# Patient Record
Sex: MALE | Race: WHITE | NOT HISPANIC OR LATINO | Employment: OTHER | URBAN - METROPOLITAN AREA
[De-identification: names, ages, dates, MRNs, and addresses within clinical notes are randomized per-mention and may not be internally consistent; named-entity substitution may affect disease eponyms.]

---

## 2018-04-11 ENCOUNTER — OFFICE VISIT (OUTPATIENT)
Dept: INTERNAL MEDICINE | Facility: CLINIC | Age: 78
End: 2018-04-11
Payer: MEDICARE

## 2018-04-11 VITALS
SYSTOLIC BLOOD PRESSURE: 138 MMHG | HEART RATE: 66 BPM | TEMPERATURE: 97 F | HEIGHT: 69 IN | WEIGHT: 153.88 LBS | DIASTOLIC BLOOD PRESSURE: 82 MMHG | BODY MASS INDEX: 22.79 KG/M2 | OXYGEN SATURATION: 99 %

## 2018-04-11 DIAGNOSIS — Z85.46 HISTORY OF PROSTATE CANCER: ICD-10-CM

## 2018-04-11 DIAGNOSIS — I10 ESSENTIAL HYPERTENSION: ICD-10-CM

## 2018-04-11 PROCEDURE — 99204 OFFICE O/P NEW MOD 45 MIN: CPT | Mod: S$GLB,,, | Performed by: FAMILY MEDICINE

## 2018-04-11 PROCEDURE — 3079F DIAST BP 80-89 MM HG: CPT | Mod: CPTII,S$GLB,, | Performed by: FAMILY MEDICINE

## 2018-04-11 PROCEDURE — 3075F SYST BP GE 130 - 139MM HG: CPT | Mod: CPTII,S$GLB,, | Performed by: FAMILY MEDICINE

## 2018-04-11 PROCEDURE — 99999 PR PBB SHADOW E&M-NEW PATIENT-LVL III: CPT | Mod: PBBFAC,,, | Performed by: FAMILY MEDICINE

## 2018-04-11 RX ORDER — LISINOPRIL 10 MG/1
10 TABLET ORAL DAILY
Qty: 90 TABLET | Refills: 3 | Status: SHIPPED | OUTPATIENT
Start: 2018-04-11 | End: 2018-10-16

## 2018-04-11 RX ORDER — MULTIVITAMIN
1 TABLET ORAL DAILY
COMMUNITY

## 2018-04-11 RX ORDER — LISINOPRIL 10 MG/1
10 TABLET ORAL DAILY
COMMUNITY
Start: 2018-01-25 | End: 2018-04-11 | Stop reason: SDUPTHER

## 2018-04-11 NOTE — PROGRESS NOTES
Subjective:   Patient ID:  Pravin Ferrari is a 77 y.o. male.    Chief Complaint:  Establish Care and Annual Exam    Past Medical History:   Diagnosis Date    Hypertension      Past Surgical History:   Procedure Laterality Date    EYE SURGERY      HERNIA REPAIR      PROSTATE SURGERY       Family History   Problem Relation Age of Onset    Aneurysm Father     Cerebral aneurysm Father     Prostate cancer Brother     Prostate cancer Son      Review of patient's allergies indicates:  No Known Allergies    Current Outpatient Prescriptions:     lisinopril 10 MG tablet, Take 1 tablet (10 mg total) by mouth once daily., Disp: 90 tablet, Rfl: 3    multivitamin (THERAGRAN) per tablet, Take 1 tablet by mouth once daily., Disp: , Rfl:        Establish care/follow-up hypertension/medication refill.  Most recent PCP Dr. Marshal Richards.  In November started on lisinopril 10 mg daily for elevated blood pressure.  No side effects.  Reports compliance.  Blood pressure readings from home show average less than 140/90 since starting medication.  History prostate cancer.  Followed by Dr. Seay.  Reports lab work done November 2017.  States Pneumonia, Tetanus, shingles, influenza vaccinations up-to-date.  States colonoscopy up-to-date.  No active problems or concerns today.      Hypertension   This is a chronic problem. The current episode started more than 1 month ago. The problem is controlled. Pertinent negatives include no anxiety, blurred vision, chest pain, headaches, malaise/fatigue, neck pain, orthopnea, palpitations, peripheral edema, PND, shortness of breath or sweats. There are no associated agents to hypertension. Risk factors for coronary artery disease include male gender and stress. Past treatments include ACE inhibitors. The current treatment provides significant improvement. There are no compliance problems.  There is no history of angina, kidney disease, CAD/MI, CVA, heart failure, left ventricular  "hypertrophy, PVD or retinopathy.       Review of Systems   Constitutional: Negative for fatigue and malaise/fatigue.   Eyes: Negative for blurred vision and visual disturbance.   Respiratory: Negative for cough, chest tightness and shortness of breath.    Cardiovascular: Negative for chest pain, palpitations, orthopnea, leg swelling and PND.   Gastrointestinal: Negative for abdominal pain, diarrhea, nausea and vomiting.   Genitourinary: Positive for frequency and urgency. Negative for decreased urine volume, difficulty urinating, dysuria, flank pain and hematuria.   Musculoskeletal: Negative for myalgias and neck pain.   Skin: Negative for rash.   Neurological: Negative for dizziness, syncope, weakness, light-headedness and headaches.   Psychiatric/Behavioral: Negative for sleep disturbance.       Objective:   /82 (BP Location: Right arm, Patient Position: Sitting, BP Method: Small (Manual))   Pulse 66   Temp 96.5 °F (35.8 °C) (Tympanic)   Ht 5' 9" (1.753 m)   Wt 69.8 kg (153 lb 14.1 oz)   SpO2 99%   BMI 22.72 kg/m²     Physical Exam   Constitutional: Vital signs are normal. He appears well-developed and well-nourished.   Neck: No JVD present.   Cardiovascular: Normal rate, regular rhythm and normal heart sounds.  Exam reveals no gallop and no friction rub.    No murmur heard.  Pulmonary/Chest: Effort normal and breath sounds normal. He has no wheezes. He has no rhonchi. He has no rales.   Abdominal: Soft. He exhibits no distension. There is no tenderness.   Musculoskeletal: He exhibits no edema.   Skin: Skin is warm, dry and intact. No rash noted.   Psychiatric: His mood appears anxious.   Nursing note and vitals reviewed.    Assessment:     1. Essential hypertension    2. History of prostate cancer      Plan:   Essential hypertension  -     lisinopril 10 MG tablet; Take 1 tablet (10 mg total) by mouth once daily.  Dispense: 90 tablet; Refill: 3  Controlled.  BP at goal.  Continue lisinopril 10 mg " daily.  Request old records from  and [facility].  After review of records update immunizations as needed.    History of prostate cancer  Stable.  No recurrence.  Incontinence and some  symptoms since surgery.  Follow-up Dr. Seay as scheduled.    Return to clinic 6 months or sooner as needed.

## 2018-10-16 ENCOUNTER — PATIENT MESSAGE (OUTPATIENT)
Dept: ADMINISTRATIVE | Facility: OTHER | Age: 78
End: 2018-10-16

## 2018-10-16 ENCOUNTER — LAB VISIT (OUTPATIENT)
Dept: LAB | Facility: HOSPITAL | Age: 78
End: 2018-10-16
Attending: FAMILY MEDICINE
Payer: MEDICARE

## 2018-10-16 ENCOUNTER — OFFICE VISIT (OUTPATIENT)
Dept: INTERNAL MEDICINE | Facility: CLINIC | Age: 78
End: 2018-10-16
Payer: MEDICARE

## 2018-10-16 VITALS
TEMPERATURE: 98 F | HEART RATE: 73 BPM | OXYGEN SATURATION: 98 % | WEIGHT: 160.25 LBS | HEIGHT: 69 IN | BODY MASS INDEX: 23.74 KG/M2 | DIASTOLIC BLOOD PRESSURE: 86 MMHG | SYSTOLIC BLOOD PRESSURE: 130 MMHG

## 2018-10-16 DIAGNOSIS — Z12.11 SCREENING FOR COLON CANCER: ICD-10-CM

## 2018-10-16 DIAGNOSIS — I10 ESSENTIAL HYPERTENSION: Chronic | ICD-10-CM

## 2018-10-16 DIAGNOSIS — Z23 NEED FOR PNEUMOCOCCAL VACCINATION: ICD-10-CM

## 2018-10-16 DIAGNOSIS — I10 ESSENTIAL HYPERTENSION: Primary | Chronic | ICD-10-CM

## 2018-10-16 DIAGNOSIS — Z85.46 HISTORY OF PROSTATE CANCER: Chronic | ICD-10-CM

## 2018-10-16 DIAGNOSIS — Z23 NEED FOR INFLUENZA VACCINATION: ICD-10-CM

## 2018-10-16 LAB
ALBUMIN SERPL BCP-MCNC: 3.6 G/DL
ALP SERPL-CCNC: 62 U/L
ALT SERPL W/O P-5'-P-CCNC: 26 U/L
ANION GAP SERPL CALC-SCNC: 5 MMOL/L
AST SERPL-CCNC: 27 U/L
BILIRUB SERPL-MCNC: 0.5 MG/DL
BUN SERPL-MCNC: 20 MG/DL
CALCIUM SERPL-MCNC: 9.3 MG/DL
CHLORIDE SERPL-SCNC: 106 MMOL/L
CHOLEST SERPL-MCNC: 171 MG/DL
CHOLEST/HDLC SERPL: 2.9 {RATIO}
CO2 SERPL-SCNC: 30 MMOL/L
CREAT SERPL-MCNC: 1.1 MG/DL
ERYTHROCYTE [DISTWIDTH] IN BLOOD BY AUTOMATED COUNT: 12.9 %
EST. GFR  (AFRICAN AMERICAN): >60 ML/MIN/1.73 M^2
EST. GFR  (NON AFRICAN AMERICAN): >60 ML/MIN/1.73 M^2
GLUCOSE SERPL-MCNC: 85 MG/DL
HCT VFR BLD AUTO: 45.1 %
HDLC SERPL-MCNC: 58 MG/DL
HDLC SERPL: 33.9 %
HGB BLD-MCNC: 14.3 G/DL
LDLC SERPL CALC-MCNC: 100 MG/DL
MCH RBC QN AUTO: 29.6 PG
MCHC RBC AUTO-ENTMCNC: 31.7 G/DL
MCV RBC AUTO: 93 FL
NONHDLC SERPL-MCNC: 113 MG/DL
PLATELET # BLD AUTO: 247 K/UL
PMV BLD AUTO: 11.1 FL
POTASSIUM SERPL-SCNC: 4.3 MMOL/L
PROT SERPL-MCNC: 6.9 G/DL
RBC # BLD AUTO: 4.83 M/UL
SODIUM SERPL-SCNC: 141 MMOL/L
TRIGL SERPL-MCNC: 65 MG/DL
WBC # BLD AUTO: 6.69 K/UL

## 2018-10-16 PROCEDURE — 3079F DIAST BP 80-89 MM HG: CPT | Mod: CPTII,,, | Performed by: FAMILY MEDICINE

## 2018-10-16 PROCEDURE — 99213 OFFICE O/P EST LOW 20 MIN: CPT | Mod: PBBFAC,PO | Performed by: FAMILY MEDICINE

## 2018-10-16 PROCEDURE — 3075F SYST BP GE 130 - 139MM HG: CPT | Mod: CPTII,,, | Performed by: FAMILY MEDICINE

## 2018-10-16 PROCEDURE — 80061 LIPID PANEL: CPT

## 2018-10-16 PROCEDURE — 90670 PCV13 VACCINE IM: CPT | Mod: PBBFAC,PO

## 2018-10-16 PROCEDURE — 90662 IIV NO PRSV INCREASED AG IM: CPT | Mod: PBBFAC,PO

## 2018-10-16 PROCEDURE — 85027 COMPLETE CBC AUTOMATED: CPT

## 2018-10-16 PROCEDURE — 1101F PT FALLS ASSESS-DOCD LE1/YR: CPT | Mod: CPTII,,, | Performed by: FAMILY MEDICINE

## 2018-10-16 PROCEDURE — 80053 COMPREHEN METABOLIC PANEL: CPT

## 2018-10-16 PROCEDURE — 99214 OFFICE O/P EST MOD 30 MIN: CPT | Mod: S$PBB,,, | Performed by: FAMILY MEDICINE

## 2018-10-16 PROCEDURE — 99999 PR PBB SHADOW E&M-EST. PATIENT-LVL III: CPT | Mod: PBBFAC,,, | Performed by: FAMILY MEDICINE

## 2018-10-16 PROCEDURE — 36415 COLL VENOUS BLD VENIPUNCTURE: CPT | Mod: PO

## 2018-10-16 NOTE — PROGRESS NOTES
"Subjective:   Patient ID: Pravin Ferrari is a 77 y.o. male.  Chief Complaint:  Follow-up      Patient presents follow-up on hypertension.  Previously on lisinopril 10 mg daily.  Out of medication for 3 months.  Blood pressure normal today.  Also reports blood pressure normal at home off medication.    No active cardiac symptoms or complaints.    History prostate cancer.  Stable.  Sees Dr. Seay for follow-up.  No new complaints or concerns today.      Routine health maintenance shows need for:  Lipid panel  Tetanus, shingles, pneumonia, and flu vaccine.    Colonoscopy.        Current Outpatient Medications:     multivitamin (THERAGRAN) per tablet, Take 1 tablet by mouth once daily., Disp: , Rfl:      Review of Systems   Constitutional: Negative for fatigue.   Eyes: Negative for visual disturbance.   Respiratory: Negative for cough, chest tightness and shortness of breath.    Cardiovascular: Negative for chest pain, palpitations and leg swelling.   Gastrointestinal: Negative for abdominal pain, diarrhea, nausea and vomiting.   Genitourinary: Negative for difficulty urinating.   Musculoskeletal: Negative for myalgias and neck pain.   Skin: Negative for rash.   Neurological: Negative for dizziness, syncope, weakness, light-headedness and headaches.   Psychiatric/Behavioral: Negative for sleep disturbance.     Objective:   /86 (BP Location: Right arm, Patient Position: Sitting, BP Method: Medium (Manual))   Pulse 73   Temp 97.6 °F (36.4 °C) (Tympanic)   Ht 5' 9" (1.753 m)   Wt 72.7 kg (160 lb 4.4 oz)   SpO2 98%   BMI 23.67 kg/m²     Physical Exam   Constitutional: Vital signs are normal. He appears well-developed and well-nourished.   Neck: No JVD present.   Cardiovascular: Normal rate, regular rhythm and normal heart sounds. Exam reveals no gallop and no friction rub.   No murmur heard.  Pulmonary/Chest: Effort normal and breath sounds normal. He has no wheezes. He has no rhonchi. He has no rales. "   Abdominal: Soft. He exhibits no distension. There is no tenderness.   Musculoskeletal: He exhibits no edema.   Skin: Skin is warm, dry and intact. No rash noted.   Psychiatric: He has a normal mood and affect.   Nursing note and vitals reviewed.    Assessment:     1. Essential hypertension    2. History of prostate cancer    3. Screening for colon cancer    4. Need for pneumococcal vaccination    5. Need for influenza vaccination      Plan:   Essential hypertension  -     CBC Without Differential; Future; Expected date: 10/16/2018  -     Comprehensive metabolic panel; Future; Expected date: 10/16/2018  -     Lipid panel; Future; Expected date: 10/16/2018  -     NURSING COMMUNICATION: Create [facility] Account  -     Hypertension Digital Medicine (HDMP) Enrollment Order  -     Hypertension Digital Medicine ([facility]): Assign Onboarding Questionnaires  Controlled.  BP at goal.  Off medication.    Okay to not restart lisinopril  In role in digital hypertension program.  Restart medication if average blood pressure greater than 140/90.      History of prostate cancer  Stable.    Follow-up Urology as scheduled.      RHM  -     Case request GI: COLONOSCOPY  -     (In Office Administered) Pneumococcal Conjugate Vaccine (13 Valent) (IM)  -     Influenza - High Dose (65+) (PF) (IM)   Defers tetanus and shingles vaccine until next visit.    Return to clinic 6 months or sooner as needed.

## 2018-10-17 ENCOUNTER — PATIENT MESSAGE (OUTPATIENT)
Dept: ADMINISTRATIVE | Facility: OTHER | Age: 78
End: 2018-10-17

## 2018-10-18 ENCOUNTER — DOCUMENTATION ONLY (OUTPATIENT)
Dept: ENDOSCOPY | Facility: HOSPITAL | Age: 78
End: 2018-10-18

## 2018-10-18 ENCOUNTER — TELEPHONE (OUTPATIENT)
Dept: INTERNAL MEDICINE | Facility: CLINIC | Age: 78
End: 2018-10-18

## 2018-10-18 RX ORDER — SODIUM, POTASSIUM,MAG SULFATES 17.5-3.13G
SOLUTION, RECONSTITUTED, ORAL ORAL
Qty: 354 ML | Refills: 0 | Status: SHIPPED | OUTPATIENT
Start: 2018-10-18 | End: 2019-03-11

## 2018-10-18 NOTE — TELEPHONE ENCOUNTER
Patient called in regards to digital monitor not compatible with the lap top.  Patient advised to call pharmacy for clarification.  Patient voiced understanding.

## 2018-10-18 NOTE — PROGRESS NOTES
Endoscopy Scheduling Questionnaire:    Call Type: Incoming call via [facility]    1. Have you been admitted overnight to the hospital in the past 3 months? no  2. Do you get CP and SOB while walking up a flight of stairs? no  3. Have you had a stent placed in the past 12 months? no  4. Have you had a stroke or heart attack in the past 6 months? no  5. Have you had any chest pain in the past 3 months? no      If so, have you been evaluated by your PCP or Cardiologist? no  6. Do you take weight loss medications? no  7. Have you been diagnosed with Diverticulitis within the past 3 months? no  8. Are you having any GI symptoms that you feel need to be evaluated prior to your procedure? no  9. Are you on dialysis? no  10. Are you diabetic? no  11. Do you have any other health issues that you feel might limit your ability to safely have the procedure and/or sedation? no  12. Is the patient over 81 yo? no        If so, has the patient been seen by their PCP or GI in the last 3 months? N/A       -I have reviewed the last colonoscopy for recommendations regarding surveillance and bowel prep.   -I have reviewed the patient's medications and allergies. He is not on high risk medications and will not require cardiac clearance.  -I have verified the pharmacy information. The prep being used is Suprep. The patient's prep instructions were sent via mail..    Date Endoscopy Scheduled: (12/20/18)

## 2018-10-18 NOTE — TELEPHONE ENCOUNTER
----- Message from Melissa Lewis sent at 10/18/2018  8:54 AM CDT -----  Contact: self [phone]  States that he needs to speak to nurse regarding monitoring his blood pressure. Please call back at [phone]//thank you acc

## 2018-10-18 NOTE — TELEPHONE ENCOUNTER
----- Message from Nazia Oconnor sent at 10/18/2018  9:28 AM CDT -----  Contact: pt   Pt states that he left the wrong number in a previous messages and want to give the correct. Pt states that he was returning nurse call.      [phone]

## 2018-11-02 ENCOUNTER — PATIENT OUTREACH (OUTPATIENT)
Dept: OTHER | Facility: OTHER | Age: 78
End: 2018-11-02

## 2018-11-02 NOTE — PROGRESS NOTES
"Last 5 Patient Entered Readings                                      Current 30 Day Average: 139/88     Recent Readings 10/29/2018 10/28/2018 10/27/2018 10/26/2018 10/23/2018    SBP (mmHg) 149 176 120 120 120    DBP (mmHg) 116 87 80 80 80    Pulse 66 73 80 80 80        11/2-Patient declined [facility] participation. He states someone "involuntaraily enrolled me." He appreciated  call. Removed from [facility].   "

## 2018-11-02 NOTE — PROGRESS NOTES
Last 5 Patient Entered Readings                                      Current 30 Day Average: 139/88     Recent Readings 10/29/2018 10/28/2018 10/27/2018 10/26/2018 10/23/2018    SBP (mmHg) 149 176 120 120 120    DBP (mmHg) 116 87 80 80 80    Pulse 66 73 80 80 80        11/2-Enrollment attempt #1. Left contact information with woman who answered the phone.

## 2018-12-14 ENCOUNTER — TELEPHONE (OUTPATIENT)
Dept: ENDOSCOPY | Facility: HOSPITAL | Age: 78
End: 2018-12-14

## 2018-12-14 NOTE — TELEPHONE ENCOUNTER
Pt rescheduled from 12-20-18 to 2-21-19 due to endo schedule change. New SUPREP instructions sent via MAIL.

## 2019-02-07 ENCOUNTER — TELEPHONE (OUTPATIENT)
Dept: ENDOSCOPY | Facility: HOSPITAL | Age: 79
End: 2019-02-07

## 2019-02-07 NOTE — TELEPHONE ENCOUNTER
Due to change in endo scheduling, patient will be moved from 2/21/19 to 3-19-19. New instructions mailed to patient.

## 2019-03-11 ENCOUNTER — OFFICE VISIT (OUTPATIENT)
Dept: INTERNAL MEDICINE | Facility: CLINIC | Age: 79
End: 2019-03-11
Payer: MEDICARE

## 2019-03-11 VITALS
OXYGEN SATURATION: 96 % | HEART RATE: 73 BPM | HEIGHT: 69 IN | BODY MASS INDEX: 23.58 KG/M2 | WEIGHT: 159.19 LBS | DIASTOLIC BLOOD PRESSURE: 84 MMHG | SYSTOLIC BLOOD PRESSURE: 128 MMHG | TEMPERATURE: 97 F

## 2019-03-11 DIAGNOSIS — Z23 NEED FOR SHINGLES VACCINE: ICD-10-CM

## 2019-03-11 DIAGNOSIS — H91.93 BILATERAL HEARING LOSS, UNSPECIFIED HEARING LOSS TYPE: ICD-10-CM

## 2019-03-11 DIAGNOSIS — Z85.46 HISTORY OF PROSTATE CANCER: Chronic | ICD-10-CM

## 2019-03-11 DIAGNOSIS — I10 ESSENTIAL HYPERTENSION: Primary | Chronic | ICD-10-CM

## 2019-03-11 PROCEDURE — 99999 PR PBB SHADOW E&M-EST. PATIENT-LVL III: CPT | Mod: PBBFAC,HCNC,, | Performed by: FAMILY MEDICINE

## 2019-03-11 PROCEDURE — 99999 PR PBB SHADOW E&M-EST. PATIENT-LVL III: ICD-10-PCS | Mod: PBBFAC,HCNC,, | Performed by: FAMILY MEDICINE

## 2019-03-11 PROCEDURE — 99214 PR OFFICE/OUTPT VISIT, EST, LEVL IV, 30-39 MIN: ICD-10-PCS | Mod: HCNC,S$GLB,, | Performed by: FAMILY MEDICINE

## 2019-03-11 PROCEDURE — 1101F PT FALLS ASSESS-DOCD LE1/YR: CPT | Mod: HCNC,CPTII,S$GLB, | Performed by: FAMILY MEDICINE

## 2019-03-11 PROCEDURE — 3074F PR MOST RECENT SYSTOLIC BLOOD PRESSURE < 130 MM HG: ICD-10-PCS | Mod: HCNC,CPTII,S$GLB, | Performed by: FAMILY MEDICINE

## 2019-03-11 PROCEDURE — 1101F PR PT FALLS ASSESS DOC 0-1 FALLS W/OUT INJ PAST YR: ICD-10-PCS | Mod: HCNC,CPTII,S$GLB, | Performed by: FAMILY MEDICINE

## 2019-03-11 PROCEDURE — 3079F PR MOST RECENT DIASTOLIC BLOOD PRESSURE 80-89 MM HG: ICD-10-PCS | Mod: HCNC,CPTII,S$GLB, | Performed by: FAMILY MEDICINE

## 2019-03-11 PROCEDURE — 99214 OFFICE O/P EST MOD 30 MIN: CPT | Mod: HCNC,S$GLB,, | Performed by: FAMILY MEDICINE

## 2019-03-11 PROCEDURE — 3074F SYST BP LT 130 MM HG: CPT | Mod: HCNC,CPTII,S$GLB, | Performed by: FAMILY MEDICINE

## 2019-03-11 PROCEDURE — 3079F DIAST BP 80-89 MM HG: CPT | Mod: HCNC,CPTII,S$GLB, | Performed by: FAMILY MEDICINE

## 2019-03-11 NOTE — PROGRESS NOTES
"Subjective:   Patient ID: Pravin Ferrari is a 78 y.o. male.  Chief Complaint:  Follow-up      Patient presents for six-month follow-up on hypertension.    Last visit CBC, CMP, lipid panel stable.    Colonoscopy scheduled next week.    Tetanus booster reported within 10 years.    Previous shingles vaccine was Maude vacs.  Needs showing works.  Blood pressure remains well controlled off medications.    Only concern is episode of dizziness and weakness with fall when hiking in [facility].  No recurrence.  No long-term sequelae.  Back to normal.    Also finally interested in  Audiology evaluation for possible hearing aids due to subjective hearing loss.        Review of Systems   Constitutional: Negative for fatigue.   HENT: Positive for hearing loss (Subjective, bilaterally, worsening.). Negative for congestion, dental problem, drooling, ear discharge, ear pain, facial swelling, mouth sores, nosebleeds, postnasal drip, rhinorrhea, sinus pressure, sinus pain, sneezing, sore throat, tinnitus, trouble swallowing and voice change.    Eyes: Negative for visual disturbance.   Respiratory: Negative for cough, chest tightness and shortness of breath.    Cardiovascular: Negative for chest pain, palpitations and leg swelling.   Gastrointestinal: Negative for abdominal pain, diarrhea, nausea and vomiting.   Genitourinary: Negative for difficulty urinating.   Musculoskeletal: Negative for myalgias and neck pain.   Skin: Negative for rash.   Neurological: Negative for dizziness, tremors, seizures, syncope, facial asymmetry, speech difficulty, weakness, light-headedness, numbness and headaches.   Psychiatric/Behavioral: Negative for sleep disturbance.     Objective:   /84 (BP Location: Right arm, Patient Position: Sitting, BP Method: Medium (Manual))   Pulse 73   Temp 97.1 °F (36.2 °C) (Tympanic)   Ht 5' 9" (1.753 m)   Wt 72.2 kg (159 lb 2.8 oz)   SpO2 96%   BMI 23.51 kg/m²     Physical Exam   Constitutional: He " is oriented to person, place, and time. Vital signs are normal. He appears well-developed and well-nourished.   HENT:   Right Ear: Tympanic membrane, external ear and ear canal normal. Decreased hearing is noted.   Left Ear: Tympanic membrane, external ear and ear canal normal. Decreased hearing is noted.   Neck: No JVD present. No thyroid mass and no thyromegaly present.   Cardiovascular: Normal rate, regular rhythm and normal heart sounds. Exam reveals no gallop and no friction rub.   No murmur heard.  Pulses:       Radial pulses are 2+ on the right side, and 2+ on the left side.   Pulmonary/Chest: Effort normal and breath sounds normal. He has no wheezes. He has no rhonchi. He has no rales.   Abdominal: Soft. He exhibits no distension. There is no tenderness. There is no rebound, no guarding and no CVA tenderness.   Musculoskeletal: He exhibits no edema.   Lymphadenopathy:     He has no cervical adenopathy.   Neurological: He is alert and oriented to person, place, and time. He displays a negative Romberg sign. Coordination and gait normal.   Skin: Skin is warm and dry. No rash noted.   Psychiatric: He has a normal mood and affect.     Assessment:       ICD-10-CM ICD-9-CM   1. Essential hypertension I10 401.9   2. Bilateral hearing loss, unspecified hearing loss type H91.93 389.9   3. History of prostate cancer Z85.46 V10.46   4. Need for shingles vaccine Z23 V04.89     Plan:   Essential hypertension  Controlled.  BP at goal.    Okay to remain off medications.    No recurrence of weakness / lightheadedness / fall.  Presumably related to hiking, dehydration, now to changes.    No additional workup or evaluation needed unless recurs.    Bilateral hearing loss, unspecified hearing loss type  -     Ambulatory Referral to Audiology    History of prostate cancer  Continue/ follow-up Urology as scheduled.      Need for shingles vaccine  -     varicella-zoster [id number], PF, (SHINGRIX, PF,) 50 mcg/0.5 mL injection; Inject  0.5 mLs into the muscle once. for 1 dose  Dispense: 0.5 mL; Refill: 0  Reported tetanus booster within 10 years.    Colonoscopy next week.      Return to clinic 6 months or sooner as needed.

## 2019-03-12 ENCOUNTER — TELEPHONE (OUTPATIENT)
Dept: OTOLARYNGOLOGY | Facility: CLINIC | Age: 79
End: 2019-03-12

## 2019-03-12 NOTE — TELEPHONE ENCOUNTER
Spoke with pt's wife regarding need for hearing test prior to seeing ENT.  Pt scheduled for audiogram on 3/13/19 @ 8:30am at the [facility] and will see Chary Heredia PA-C at 9:20am.  Pt's wife verbalized understanding of all appt details.

## 2019-03-12 NOTE — TELEPHONE ENCOUNTER
----- Message from Chary Heredia PA-C sent at 3/12/2019 11:02 AM CDT -----  He's scheduled to see me tomorrow for hearing loss.  Please try and schedule an audiogram too.  Thanks!

## 2019-03-13 ENCOUNTER — OFFICE VISIT (OUTPATIENT)
Dept: OTOLARYNGOLOGY | Facility: CLINIC | Age: 79
End: 2019-03-13
Payer: MEDICARE

## 2019-03-13 ENCOUNTER — CLINICAL SUPPORT (OUTPATIENT)
Dept: AUDIOLOGY | Facility: CLINIC | Age: 79
End: 2019-03-13
Payer: MEDICARE

## 2019-03-13 VITALS
HEART RATE: 67 BPM | WEIGHT: 157.44 LBS | BODY MASS INDEX: 23.25 KG/M2 | SYSTOLIC BLOOD PRESSURE: 160 MMHG | DIASTOLIC BLOOD PRESSURE: 77 MMHG | TEMPERATURE: 98 F

## 2019-03-13 DIAGNOSIS — H90.5 HEARING LOSS, SENSORINEURAL, COMBINED TYPES: Primary | ICD-10-CM

## 2019-03-13 DIAGNOSIS — H61.23 BILATERAL IMPACTED CERUMEN: ICD-10-CM

## 2019-03-13 DIAGNOSIS — H90.3 SENSORINEURAL HEARING LOSS (SNHL) OF BOTH EARS: Primary | ICD-10-CM

## 2019-03-13 DIAGNOSIS — H93.13 TINNITUS OF BOTH EARS: ICD-10-CM

## 2019-03-13 PROCEDURE — 3078F PR MOST RECENT DIASTOLIC BLOOD PRESSURE < 80 MM HG: ICD-10-PCS | Mod: HCNC,CPTII,S$GLB, | Performed by: PHYSICIAN ASSISTANT

## 2019-03-13 PROCEDURE — 3078F DIAST BP <80 MM HG: CPT | Mod: HCNC,CPTII,S$GLB, | Performed by: PHYSICIAN ASSISTANT

## 2019-03-13 PROCEDURE — 92557 PR COMPREHENSIVE HEARING TEST: ICD-10-PCS | Mod: HCNC,S$GLB,, | Performed by: AUDIOLOGIST

## 2019-03-13 PROCEDURE — 3077F PR MOST RECENT SYSTOLIC BLOOD PRESSURE >= 140 MM HG: ICD-10-PCS | Mod: HCNC,CPTII,S$GLB, | Performed by: PHYSICIAN ASSISTANT

## 2019-03-13 PROCEDURE — 99203 PR OFFICE/OUTPT VISIT, NEW, LEVL III, 30-44 MIN: ICD-10-PCS | Mod: 25,HCNC,S$GLB, | Performed by: PHYSICIAN ASSISTANT

## 2019-03-13 PROCEDURE — 92567 PR TYMPA2METRY: ICD-10-PCS | Mod: HCNC,S$GLB,, | Performed by: AUDIOLOGIST

## 2019-03-13 PROCEDURE — 99999 PR PBB SHADOW E&M-EST. PATIENT-LVL III: CPT | Mod: PBBFAC,HCNC,, | Performed by: PHYSICIAN ASSISTANT

## 2019-03-13 PROCEDURE — 1101F PT FALLS ASSESS-DOCD LE1/YR: CPT | Mod: HCNC,CPTII,S$GLB, | Performed by: PHYSICIAN ASSISTANT

## 2019-03-13 PROCEDURE — 92557 COMPREHENSIVE HEARING TEST: CPT | Mod: HCNC,S$GLB,, | Performed by: AUDIOLOGIST

## 2019-03-13 PROCEDURE — 99203 OFFICE O/P NEW LOW 30 MIN: CPT | Mod: 25,HCNC,S$GLB, | Performed by: PHYSICIAN ASSISTANT

## 2019-03-13 PROCEDURE — 3077F SYST BP >= 140 MM HG: CPT | Mod: HCNC,CPTII,S$GLB, | Performed by: PHYSICIAN ASSISTANT

## 2019-03-13 PROCEDURE — G0268 PR REMOVAL OF IMPACTED WAX MD: ICD-10-PCS | Mod: HCNC,S$GLB,, | Performed by: PHYSICIAN ASSISTANT

## 2019-03-13 PROCEDURE — 92567 TYMPANOMETRY: CPT | Mod: HCNC,S$GLB,, | Performed by: AUDIOLOGIST

## 2019-03-13 PROCEDURE — G0268 REMOVAL OF IMPACTED WAX MD: HCPCS | Mod: HCNC,S$GLB,, | Performed by: PHYSICIAN ASSISTANT

## 2019-03-13 PROCEDURE — 99999 PR PBB SHADOW E&M-EST. PATIENT-LVL III: ICD-10-PCS | Mod: PBBFAC,HCNC,, | Performed by: PHYSICIAN ASSISTANT

## 2019-03-13 PROCEDURE — 1101F PR PT FALLS ASSESS DOC 0-1 FALLS W/OUT INJ PAST YR: ICD-10-PCS | Mod: HCNC,CPTII,S$GLB, | Performed by: PHYSICIAN ASSISTANT

## 2019-03-13 NOTE — PROGRESS NOTES
Subjective:       Patient ID: Pravin Ferrari is a 78 y.o. male.    Chief Complaint: Other (Review hearing test and remove wax.)    Patient is a very pleasant 78 y.o. male here to see me today for the first time in consultation at the request of Dr. Harrington for evaluation of hearing loss.  He reports hearing loss that has been gradually progressing over the last 5 years.  He has not noted any difference in hearing between the ears, with either ear being the better hearing ear.  He has noted occasional tinnitus in both ears.  He has not had any recent issues with ear pain or ear drainage.  He has a family history of hearing loss, and has not had any previous otologic surgery.  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 He denies issues with dizziness except for one episode recently after hiking in [place]; none since then.        Review of Systems   Constitutional: Negative for activity change, appetite change and fever.   HENT: Positive for hearing loss and tinnitus. Negative for congestion, ear discharge, ear pain, nosebleeds, rhinorrhea, sinus pressure, sinus pain, sore throat and trouble swallowing.    Eyes: Negative for discharge.   Respiratory: Negative for cough and shortness of breath.    Cardiovascular: Negative for chest pain and palpitations.   Gastrointestinal: Negative for diarrhea, nausea and vomiting.   Musculoskeletal: Negative for gait problem.   Allergic/Immunologic: Negative for food allergies.   Neurological: Negative for dizziness, light-headedness and headaches.   Hematological: Negative for adenopathy.   Psychiatric/Behavioral: Negative for confusion.       Objective:      Physical Exam   Constitutional: He is oriented to person, place, and time. Vital signs are normal. He appears well-developed and well-nourished. No distress.   HENT:   Head: Normocephalic and atraumatic.   Right Ear: External ear and ear canal normal.   Left Ear: External ear and ear  canal normal.   Nose: No mucosal edema, rhinorrhea, nasal deformity or septal deviation.   Mouth/Throat: Uvula is midline, oropharynx is clear and moist and mucous membranes are normal. No trismus in the jaw. Abnormal dentition. No uvula swelling. No oropharyngeal exudate or posterior oropharyngeal edema.   Bilateral cerumen impactions (removal described below)   Eyes: Conjunctivae and EOM are normal. Pupils are equal, round, and reactive to light. Right eye exhibits no chemosis. Left eye exhibits no chemosis. Right conjunctiva is not injected. Left conjunctiva is not injected. No scleral icterus.   Neck: Trachea normal and phonation normal. No tracheal tenderness present. No tracheal deviation present. No thyroid mass and no thyromegaly present.   Cardiovascular: Intact distal pulses.   Pulmonary/Chest: Effort normal. No accessory muscle usage or stridor. No respiratory distress.   Lymphadenopathy:        Head (right side): No submental, no submandibular, no preauricular and no posterior auricular adenopathy present.        Head (left side): No submental, no submandibular, no preauricular and no posterior auricular adenopathy present.     He has no cervical adenopathy.        Right cervical: No superficial cervical and no deep cervical adenopathy present.       Left cervical: No superficial cervical and no deep cervical adenopathy present.   Neurological: He is alert and oriented to person, place, and time. No cranial nerve deficit.   Skin: Skin is warm and dry. No rash noted. No erythema.   Psychiatric: He has a normal mood and affect. His behavior is normal. Thought content normal.         Procedure Note    CHIEF COMPLAINT:  Cerumen Impaction    Description:  The patient was seated in an exam chair.  An ear speculum was placed in the right EAC and was examined under the microscope.  Suction and/or loop curettes were used to remove a large cerumen impaction.  The tympanic membrane was visualized and was normal in  appearance.  The procedure was repeated on the left side in a similar fashion.  The TM was intact and normal on this side as well. Tiny abrasion along inferior EAC with trace amount of blood noted.  No maricarmen bleeding.  The patient tolerated the procedure well.          AUDIOGRAM:  Results reveal a mild-to-profound sensorineural hearing loss 250-8000 Hz for the right ear, and  mild-to-profound sensorineural hearing loss 250-8000 Hz for the left ear.   Speech Reception Thresholds were  45 dBHL for the right ear and 40 dBHL for the left ear.   Word recognition scores were good for the right ear and good for the left ear.   Tympanograms were Type A, normal for the right ear and Type A, normal for the left ear.            Assessment:       1. Sensorineural hearing loss (SNHL) of both ears    2. Tinnitus of both ears    3. Bilateral impacted cerumen        Plan:         1.  SNHL:  We reviewed the patient's recent audiogram and hearing loss in detail.  We also discussed that he is a good candidate for hearing aids, if and when he the patient is motivated.  He was given handouts with information and pricing of hearing aids, and will contact audiology when ready to proceed.  We also discussed the use hearing protection when exposed to loud noise, including lawn equipment.   2.  Tinnitus:  We reviewed his audiogram together in detail.  We also discussed that tinnitus is most often caused by a hearing loss, and that as the hair cells are damaged, either genetic or as a result of loud noise exposure, they then cause tinnitus.  Some patients find that restricting the salt or caffeine in their diet helps, and there is also an OTC supplement, lipoflavinoids, that some people find to be effective though their benefit is not fully proven.  Tinnitus tends to be louder in times of stress and fatigue, and may decrease with time.  Sound machines may also be an effective masking technique if needed at night.  3.   Cerumen impaction:   Removed today without difficulty.  I would recommend the use of a wax softening drop, either over the counter Debrox or mineral oil, on a weekly basis.  I also instructed the patient to avoid Qtips.    Thanks for the referral Dr. Harrington.  Report returned via Epic.

## 2019-03-13 NOTE — LETTER
March 13, 2019      José Miguel Harrington MD  [address] [place] [address]           [facility]  [address]  [place] [address]  Phone: [phone]  Fax: [phone]          Patient: Pravin Ferrari   MR Number: [id number]   YOB: 1940   Date of Visit: 3/13/2019       Dear Dr. José Miguel Harrington:    Thank you for referring Pravin Ferrari to me for evaluation. Attached you will find relevant portions of my assessment and plan of care.    If you have questions, please do not hesitate to call me. I look forward to following Pravin Ferrari along with you.    Sincerely,    Chary Heredia PA-C    Enclosure  CC:  No Recipients    If you would like to receive this communication electronically, please contact [email or web address] or [phone] to request more information on [employer] access.    For providers and/or their staff who would like to refer a patient to [facility], please contact us through our one-stop-shop provider referral line, [facility], at [phone].    If you feel you have received this communication in error or would no longer like to receive these types of communications, please e-mail [email or web address]

## 2019-03-15 ENCOUNTER — TELEPHONE (OUTPATIENT)
Dept: ENDOSCOPY | Facility: HOSPITAL | Age: 79
End: 2019-03-15

## 2019-03-15 RX ORDER — SODIUM, POTASSIUM,MAG SULFATES 17.5-3.13G
SOLUTION, RECONSTITUTED, ORAL ORAL
Qty: 354 ML | Refills: 0 | Status: ON HOLD | OUTPATIENT
Start: 2019-03-15 | End: 2019-03-19 | Stop reason: CLARIF

## 2019-03-15 NOTE — TELEPHONE ENCOUNTER
Pt wife stated one bottle of the suprep bowel prep solution has been mis placed at their home and she is requesting a new script for colonoscopy on 3/19/19.    I advised pt a new script will be sent to the pharmacy along with a set of instructions. Pt stated understanding.

## 2019-03-18 PROBLEM — Z12.11 COLON CANCER SCREENING: Status: ACTIVE | Noted: 2019-03-18

## 2019-03-19 ENCOUNTER — HOSPITAL ENCOUNTER (OUTPATIENT)
Facility: HOSPITAL | Age: 79
Discharge: HOME | End: 2019-03-19
Attending: INTERNAL MEDICINE | Admitting: INTERNAL MEDICINE
Payer: MEDICARE

## 2019-03-19 ENCOUNTER — ANESTHESIA (OUTPATIENT)
Dept: ENDOSCOPY | Facility: HOSPITAL | Age: 79
End: 2019-03-19
Payer: MEDICARE

## 2019-03-19 ENCOUNTER — ANESTHESIA EVENT (OUTPATIENT)
Dept: ENDOSCOPY | Facility: HOSPITAL | Age: 79
End: 2019-03-19
Payer: MEDICARE

## 2019-03-19 VITALS
HEIGHT: 68 IN | TEMPERATURE: 99 F | DIASTOLIC BLOOD PRESSURE: 60 MMHG | BODY MASS INDEX: 23.22 KG/M2 | WEIGHT: 153.25 LBS | RESPIRATION RATE: 18 BRPM | OXYGEN SATURATION: 98 % | HEART RATE: 71 BPM | SYSTOLIC BLOOD PRESSURE: 124 MMHG

## 2019-03-19 DIAGNOSIS — Z12.11 COLON CANCER SCREENING: Primary | ICD-10-CM

## 2019-03-19 PROCEDURE — G0121 COLON CA SCRN NOT HI RSK IND: HCPCS | Mod: HCNC | Performed by: INTERNAL MEDICINE

## 2019-03-19 PROCEDURE — 63600175 PHARM REV CODE 636 W HCPCS: Mod: HCNC | Performed by: NURSE ANESTHETIST, CERTIFIED REGISTERED

## 2019-03-19 PROCEDURE — 25000003 PHARM REV CODE 250: Mod: HCNC | Performed by: NURSE ANESTHETIST, CERTIFIED REGISTERED

## 2019-03-19 PROCEDURE — 37000008 HC ANESTHESIA 1ST 15 MINUTES: Mod: HCNC | Performed by: INTERNAL MEDICINE

## 2019-03-19 PROCEDURE — 25000003 PHARM REV CODE 250: Mod: HCNC | Performed by: INTERNAL MEDICINE

## 2019-03-19 PROCEDURE — G0121 COLON CA SCRN NOT HI RSK IND: ICD-10-PCS | Mod: HCNC,,, | Performed by: INTERNAL MEDICINE

## 2019-03-19 PROCEDURE — 37000009 HC ANESTHESIA EA ADD 15 MINS: Mod: HCNC | Performed by: INTERNAL MEDICINE

## 2019-03-19 PROCEDURE — G0121 COLON CA SCRN NOT HI RSK IND: HCPCS | Mod: HCNC,,, | Performed by: INTERNAL MEDICINE

## 2019-03-19 RX ORDER — LIDOCAINE HYDROCHLORIDE 10 MG/ML
INJECTION INFILTRATION; PERINEURAL
Status: DISCONTINUED | OUTPATIENT
Start: 2019-03-19 | End: 2019-03-19

## 2019-03-19 RX ORDER — SODIUM CHLORIDE, SODIUM LACTATE, POTASSIUM CHLORIDE, CALCIUM CHLORIDE 600; 310; 30; 20 MG/100ML; MG/100ML; MG/100ML; MG/100ML
INJECTION, SOLUTION INTRAVENOUS CONTINUOUS
Status: DISCONTINUED | OUTPATIENT
Start: 2019-03-19 | End: 2019-03-19 | Stop reason: HOSPADM

## 2019-03-19 RX ORDER — PROPOFOL 10 MG/ML
VIAL (ML) INTRAVENOUS
Status: DISCONTINUED | OUTPATIENT
Start: 2019-03-19 | End: 2019-03-19

## 2019-03-19 RX ADMIN — PROPOFOL 70 MG: 10 INJECTION, EMULSION INTRAVENOUS at 09:03

## 2019-03-19 RX ADMIN — SODIUM CHLORIDE, SODIUM LACTATE, POTASSIUM CHLORIDE, AND CALCIUM CHLORIDE: .6; .31; .03; .02 INJECTION, SOLUTION INTRAVENOUS at 09:03

## 2019-03-19 RX ADMIN — PROPOFOL 50 MG: 10 INJECTION, EMULSION INTRAVENOUS at 09:03

## 2019-03-19 RX ADMIN — PROPOFOL 30 MG: 10 INJECTION, EMULSION INTRAVENOUS at 09:03

## 2019-03-19 RX ADMIN — LIDOCAINE HYDROCHLORIDE 50 MG: 10 INJECTION, SOLUTION INFILTRATION; PERINEURAL at 09:03

## 2019-03-19 NOTE — [employer] PATIENT INSTRUCTIONS
Discharge Summary/Instructions after an Endoscopic Procedure  Patient Name: Pravin Ferrari  Patient MRN: [id number]  Patient YOB: 1940 Tuesday, March 19, 2019 Korin Cavazos MD  RESTRICTIONS:  During your procedure today, you received medications for sedation.  These   medications may affect your judgment, balance and coordination.  Therefore,   for 24 hours, you have the following restrictions:   - DO NOT drive a car, operate machinery, make legal/financial decisions,   sign important papers or drink alcohol.    ACTIVITY:  Today: no heavy lifting, straining or running due to procedural   sedation/anesthesia.  The following day: return to full activity including work.  DIET:  Eat and drink normally unless instructed otherwise.     TREATMENT FOR COMMON SIDE EFFECTS:  - Mild abdominal pain, nausea, belching, bloating or excessive gas:  rest,   eat lightly and use a heating pad.  - Sore Throat: treat with throat lozenges and/or gargle with warm salt   water.  - Because air was used during the procedure, expelling large amounts of air   from your rectum or belching is normal.  - If a bowel prep was taken, you may not have a bowel movement for 1-3 days.    This is normal.  SYMPTOMS TO WATCH FOR AND REPORT TO YOUR PHYSICIAN:  1. Abdominal pain or bloating, other than gas cramps.  2. Chest pain.  3. Back pain.  4. Signs of infection such as: chills or fever occurring within 24 hours   after the procedure.  5. Rectal bleeding, which would show as bright red, maroon, or black stools.   (A tablespoon of blood from the rectum is not serious, especially if   hemorrhoids are present.)  6. Vomiting.  7. Weakness or dizziness.  GO DIRECTLY TO THE NEAREST EMERGENCY ROOM IF YOU HAVE ANY OF THE FOLLOWING:      Difficulty breathing              Chills and/or fever over 101 F   Persistent vomiting and/or vomiting blood   Severe abdominal pain   Severe chest pain   Black, tarry stools   Bleeding- more than one  tablespoon   Any other symptom or condition that you feel may need urgent attention  Your doctor recommends these additional instructions:  If any biopsies were taken, your doctors clinic will contact you in 1 to 2   weeks with any results.  - Patient has a contact number available for emergencies.  The signs and   symptoms of potential delayed complications were discussed with the   patient.  Return to normal activities tomorrow.  Written discharge   instructions were provided to the patient.   - Resume previous diet today.   - Continue present medications.   - Repeat colonoscopy is not recommended due to current age (66 years or   older) for screening purposes.   - Discharge patient to home (via wheelchair).  For questions, problems or results please call your physician Korin Cavazos MD at Work:  [phone]  If you have any questions about the above instructions, call the GI   department at [phone] or call the endoscopy unit at [phone]   from 7am until 3 pm.  [facility] [place], EMERGENCY ROOM PHONE NUMBER:   [phone]  IF A COMPLICATION OR EMERGENCY SITUATION ARISES AND YOU ARE UNABLE TO REACH   YOUR PHYSICIAN - GO DIRECTLY TO THE EMERGENCY ROOM.  I have read or have had read to me these discharge instructions for my   procedure and have received a written copy.  I understand these   instructions and will follow-up with my physician if I have any questions.     __________________________________       _____________________________________  Nurse Signature                                          Patient/Designated   Responsible Party Signature  MD Korin Nava MD  3/19/2019 10:00:56 AM  This report has been verified and signed electronically.  [employer]

## 2019-03-19 NOTE — H&P
"PRE PROCEDURE H&P    Patient Name: Pravin Ferrari  MRN: [id number]  : 1940  Date of Procedure:  3/19/2019  Referring Physician: José Miguel Harrington MD  Primary Physician: José Miguel Harrington MD  Procedure Physician: Korin Cavazos MD       Planned Procedure: EGD  Diagnosis: screening for colon cancer  Chief Complaint: Same as above    HPI: Patient is an 78 y.o. male is here for the above.  Last colonoscopy: unsure but at least 10 years or more ago.  No family history of colon cancer.  Not on AC     Past Medical History:   Past Medical History:   Diagnosis Date    Cancer     prostate    [place] (hard of hearing)     bilateral ears    Hypertension     pt states no        Past Surgical History:  Past Surgical History:   Procedure Laterality Date    EYE SURGERY      HERNIA REPAIR      PROSTATE SURGERY          Home Medications:  Prior to Admission medications    Medication Sig Start Date End Date Taking? Authorizing Provider   multivitamin (THERAGRAN) per tablet Take 1 tablet by mouth once daily.   Yes Historical Provider, MD   sodium,potassium,mag sulfates (SUPREP BOWEL PREP KIT) 17.5-3.13-1.6 gram SolR As directed 3/15/19 3/19/19  Adam Pereyra PA-C        Allergies:  Review of patient's allergies indicates:  No Known Allergies     Social History:   Social History     Socioeconomic History    Marital status:      Spouse name: Not on file    Number of children: Not on file    Years of education: Not on file    Highest education level: Not on file   Social Needs    Financial resource strain: Not on file    Food insecurity - worry: Not on file    Food insecurity - inability: Not on file    Transportation needs - medical: Not on file    Transportation needs - non-medical: Not on file   Occupational History    Not on file   Tobacco Use    Smoking status: Never Smoker   Substance and Sexual Activity    Alcohol use: Yes     Comment: drinks one beer every "once in a while during summer"    Drug " "use: No    Sexual activity: Not on file   Other Topics Concern    Not on file   Social History Narrative    Not on file       Family History:  Family History   Problem Relation Age of Onset    Aneurysm Father     Cerebral aneurysm Father     Prostate cancer Brother     Prostate cancer Son        ROS: No acute cardiac events, no acute respiratory complaints.     Physical Exam (all patients):    BP (!) 155/76   Pulse 67   Temp 98.6 °F (37 °C) (Skin)   Resp 18   Ht 5' 7.5" (1.715 m)   Wt 69.5 kg (153 lb 3.5 oz)   SpO2 100%   BMI 23.64 kg/m²   Lungs: Clear to auscultation bilaterally, respirations unlabored  Heart: Regular rate and rhythm, S1 and S2 normal, no obvious murmurs  Abdomen:  Soft, non-tender, bowel sounds normal, no masses, no organomegaly    Lab Results   Component Value Date    WBC 6.69 10/16/2018    MCV 93 10/16/2018    RDW 12.9 10/16/2018     10/16/2018    GLU 85 10/16/2018    BUN 20 10/16/2018     10/16/2018    K 4.3 10/16/2018     10/16/2018        SEDATION PLAN: MAC      History reviewed, vital signs satisfactory, cardiopulmonary status satisfactory, sedation options, risks and plans have been discussed with the patient. All their questions were answered and the patient agrees to the sedation procedures as planned and the patient is deemed an appropriate candidate for the sedation as planned.    Procedure explained to patient, informed consent obtained and placed in chart.    Korin Cavazos  3/19/2019  9:29 AM   "

## 2019-03-19 NOTE — ANESTHESIA RELEASE NOTE
"Anesthesia Release from PACU Note    Patient: Pravin Ferrari    Procedure(s) Performed: Procedure(s) (LRB):  COLONOSCOPY (N/A)    Anesthesia type: MAC    Post pain: Adequate analgesia    Post assessment: no apparent anesthetic complications, tolerated procedure well and no evidence of recall    Last Vitals:   Visit Vitals  /60   Pulse 71   Temp 37 °C (98.6 °F) (Skin)   Resp 18   Ht 5' 7.5" (1.715 m)   Wt 69.5 kg (153 lb 3.5 oz)   SpO2 98%   BMI 23.64 kg/m²       Post vital signs: stable    Level of consciousness: awake, alert  and oriented    Nausea/Vomiting: no nausea/no vomiting    Complications: none    Airway Patency: patent    Respiratory: unassisted, spontaneous ventilation, room air    Cardiovascular: stable and blood pressure at baseline    Hydration: euvolemic  "

## 2019-03-19 NOTE — DISCHARGE SUMMARY
Endoscopy Discharge Summary      Admit Date: 3/19/2019    Discharge Date and Time:  3/19/2019 10:02 AM    Attending Physician: Korin Cavazos MD     Discharge Physician: Korin Cavazos MD     Principal Admitting Diagnoses: Colon cancer screening         Discharge Diagnosis: The encounter diagnosis was Colon cancer screening.     Discharged Condition: Good    Indication for Admission: Colon cancer screening     Hospital Course: Patient was admitted for an inpatient procedure and tolerated the procedure well with no complications.    Significant Diagnostic Studies: Colonoscopy     Pathology (if any):  Specimen (12h ago, onward)    None          Estimated Blood Loss: 0 ml.    Discussed with: patient.    Disposition: Home.    Follow Up/Patient Instructions:   Current Discharge Medication List      CONTINUE these medications which have NOT CHANGED    Details   multivitamin (THERAGRAN) per tablet Take 1 tablet by mouth once daily.             Discharge Procedure Orders   Diet general     Call MD for:  temperature >100.4     Call MD for:  persistent nausea and vomiting     Call MD for:  severe uncontrolled pain     Call MD for:  difficulty breathing, headache or visual disturbances     Activity as tolerated       Follow-up Information     José Miguel Harrington MD.    Specialty:  Family Medicine  Why:  As needed  Contact information:  [address]  [place] [address] [phone]

## 2019-03-19 NOTE — DISCHARGE INSTRUCTIONS
Colonoscopy     A camera attached to a flexible tube with a viewing lens is used to take video pictures.     Colonoscopy is a test to view the inside of your lower digestive tract (colon and rectum). Sometimes it can show the last part of the small intestine (ileum). During the test, small pieces of tissue may be removed for testing. This is called a biopsy. Small growths, such as polyps, may also be removed.   Why is colonoscopy done?  The test is done to help look for colon cancer. And it can help find the source of abdominal pain, bleeding, and changes in bowel habits. It may be needed once a year, depending on factors such as your:  · Age  · Health history  · Family health history  · Symptoms  · Results from any prior colonoscopy  Risks and possible complications  These include:  · Bleeding               · A puncture or tear in the colon   · Risks of anesthesia  · A cancer lesion not being seen  Getting ready   To prepare for the test:  · Talk with your healthcare provider about the risks of the test (see below). Also ask your healthcare provider about alternatives to the test.  · Tell your healthcare provider about any medicines you take. Also tell him or her about any health conditions you may have.  · Make sure your rectum and colon are empty for the test. Follow the diet and bowel prep instructions exactly. If you dont, the test may need to be rescheduled.  · Plan for a friend or family member to drive you home after the test.     Colonoscopy provides an inside view of the entire colon.     You may discuss the results with your doctor right away or at a future visit.  During the test   The test is usually done in the hospital on an outpatient basis. This means you go home the same day. The procedure takes about 30 minutes. During that time:  · You are given relaxing (sedating) medicine through an IV line. You may be drowsy, or fully asleep.  · The healthcare provider will first give you a physical exam to  check for anal and rectal problems.  · Then the anus is lubricated and the scope inserted.  · If you are awake, you may have a feeling similar to needing to have a bowel movement. You may also feel pressure as air is pumped into the colon. Its OK to pass gas during the procedure.  · Biopsy, polyp removal, or other treatments may be done during the test.  After the test   You may have gas right after the test. It can help to try to pass it to help prevent later bloating. Your healthcare provider may discuss the results with you right away. Or you may need to schedule a follow-up visit to talk about the results. After the test, you can go back to your normal eating and other activities. You may be tired from the sedation and need to rest for a few hours.  Date Last Reviewed: 11/1/2016 © 2000-2017 [employer]. [address] [place] [address], [place] [address]. All rights reserved. This information is not intended as a substitute for professional medical care. Always follow your healthcare professional's instructions.

## 2019-03-19 NOTE — TRANSFER OF CARE
"Anesthesia Transfer of Care Note    Patient: Pravin Ferrari    Procedure(s) Performed: Procedure(s) (LRB):  COLONOSCOPY (N/A)    Patient location: GI    Anesthesia Type: MAC    Transport from OR: Transported from OR on room air with adequate spontaneous ventilation    Post pain: adequate analgesia    Post assessment: no apparent anesthetic complications    Post vital signs: stable    Level of consciousness: awake, alert and oriented    Nausea/Vomiting: no nausea/vomiting    Complications: none    Transfer of care protocol was followed      Last vitals:   Visit Vitals  BP (!) 155/76   Pulse 67   Temp 37 °C (98.6 °F) (Skin)   Resp 18   Ht 5' 7.5" (1.715 m)   Wt 69.5 kg (153 lb 3.5 oz)   SpO2 100%   BMI 23.64 kg/m²     "

## 2019-03-19 NOTE — ANESTHESIA POSTPROCEDURE EVALUATION
"Anesthesia Post Evaluation    Patient: Pravin Ferrari    Procedure(s) Performed: Procedure(s) (LRB):  COLONOSCOPY (N/A)    Final Anesthesia Type: MAC  Patient location during evaluation: GI PACU  Patient participation: Yes- Able to Participate  Level of consciousness: awake and alert  Post-procedure vital signs: reviewed and stable  Pain management: adequate  Airway patency: patent  PONV status at discharge: No PONV  Anesthetic complications: no      Cardiovascular status: blood pressure returned to baseline  Respiratory status: unassisted and spontaneous ventilation  Hydration status: euvolemic  Follow-up not needed.        Visit Vitals  /60   Pulse 71   Temp 37 °C (98.6 °F) (Skin)   Resp 18   Ht 5' 7.5" (1.715 m)   Wt 69.5 kg (153 lb 3.5 oz)   SpO2 98%   BMI 23.64 kg/m²       Pain/Suha Score: Suha Score: 10 (3/19/2019 10:20 AM)        "

## 2019-03-19 NOTE — ANESTHESIA PREPROCEDURE EVALUATION
03/19/2019  Pravin Ferrari is a 78 y.o., male.    Pre-op Assessment    I have reviewed the Patient Summary Reports.      I have reviewed the Medications.     Review of Systems  Anesthesia Hx:  No problems with previous Anesthesia  History of prior surgery of interest to airway management or planning: Previous anesthesia: General Denies Family Hx of Anesthesia complications.   Denies Personal Hx of Anesthesia complications.   Hematology/Oncology:  Hematology Normal       -- Cancer in past history:  surgery    EENT/Dental:   Hard of hearing   Cardiovascular:   Pt not on  meds and denies    Pulmonary:  Pulmonary Normal    Renal/:  Renal/ Normal     Hepatic/GI:   Bowel Prep.    Musculoskeletal:  Musculoskeletal Normal    Neurological:  Neurology Normal    Endocrine:  Endocrine Normal    Dermatological:  Skin Normal    Psych:  Psychiatric Normal           Physical Exam  General:  Well nourished    Airway/Jaw/Neck:  Airway Findings: Mouth Opening: Normal Tongue: Normal  General Airway Assessment: Adult  Mallampati: III  Improves to II with phonation.  TM Distance: 4 - 6 cm  Jaw/Neck Findings:  Neck ROM: Normal ROM      Dental:  Dental Findings: In tact        Mental Status:  Mental Status Findings:  Cooperative, Alert and Oriented         Anesthesia Plan  Type of Anesthesia, risks & benefits discussed:  Anesthesia Type:  MAC  Patient's Preference:   Intra-op Monitoring Plan: standard ASA monitors  Intra-op Monitoring Plan Comments:   Post Op Pain Control Plan:   Post Op Pain Control Plan Comments:   Induction:   IV  Beta Blocker:  Patient is not currently on a Beta-Blocker (No further documentation required).       Informed Consent: Patient understands risks and agrees with Anesthesia plan.  Questions answered. Anesthesia consent signed with patient.  ASA Score: 2     Day of Surgery Review of History &  Physical: I have interviewed and examined the patient. I have reviewed the patient's H&P dated:    H&P update referred to the provider.

## 2019-03-20 ENCOUNTER — CLINICAL SUPPORT (OUTPATIENT)
Dept: AUDIOLOGY | Facility: CLINIC | Age: 79
End: 2019-03-20
Payer: MEDICARE

## 2019-03-20 DIAGNOSIS — Z71.89 HEARING AID CONSULTATION: Primary | ICD-10-CM

## 2019-09-11 ENCOUNTER — LAB VISIT (OUTPATIENT)
Dept: LAB | Facility: HOSPITAL | Age: 79
End: 2019-09-11
Attending: FAMILY MEDICINE
Payer: MEDICARE

## 2019-09-11 ENCOUNTER — OFFICE VISIT (OUTPATIENT)
Dept: INTERNAL MEDICINE | Facility: CLINIC | Age: 79
End: 2019-09-11
Payer: MEDICARE

## 2019-09-11 VITALS
HEIGHT: 68 IN | HEART RATE: 78 BPM | TEMPERATURE: 99 F | BODY MASS INDEX: 24.76 KG/M2 | SYSTOLIC BLOOD PRESSURE: 152 MMHG | WEIGHT: 163.38 LBS | OXYGEN SATURATION: 96 % | DIASTOLIC BLOOD PRESSURE: 96 MMHG

## 2019-09-11 DIAGNOSIS — I10 ESSENTIAL HYPERTENSION: Chronic | ICD-10-CM

## 2019-09-11 DIAGNOSIS — Z00.00 ANNUAL PHYSICAL EXAM: Primary | ICD-10-CM

## 2019-09-11 DIAGNOSIS — Z85.46 HISTORY OF PROSTATE CANCER: Chronic | ICD-10-CM

## 2019-09-11 DIAGNOSIS — Z23 NEED FOR INFLUENZA VACCINATION: ICD-10-CM

## 2019-09-11 PROBLEM — Z12.11 COLON CANCER SCREENING: Status: RESOLVED | Noted: 2019-03-18 | Resolved: 2019-09-11

## 2019-09-11 LAB
ALBUMIN SERPL BCP-MCNC: 3.7 G/DL (ref 3.5–5.2)
ALP SERPL-CCNC: 64 U/L (ref 55–135)
ALT SERPL W/O P-5'-P-CCNC: 12 U/L (ref 10–44)
ANION GAP SERPL CALC-SCNC: 8 MMOL/L (ref 8–16)
AST SERPL-CCNC: 23 U/L (ref 10–40)
BILIRUB SERPL-MCNC: 0.7 MG/DL (ref 0.1–1)
BUN SERPL-MCNC: 17 MG/DL (ref 8–23)
CALCIUM SERPL-MCNC: 9.2 MG/DL (ref 8.7–10.5)
CHLORIDE SERPL-SCNC: 105 MMOL/L (ref 95–110)
CHOLEST SERPL-MCNC: 176 MG/DL (ref 120–199)
CHOLEST/HDLC SERPL: 2.9 {RATIO} (ref 2–5)
CO2 SERPL-SCNC: 28 MMOL/L (ref 23–29)
CREAT SERPL-MCNC: 1.2 MG/DL (ref 0.5–1.4)
ERYTHROCYTE [DISTWIDTH] IN BLOOD BY AUTOMATED COUNT: 12.8 % (ref 11.5–14.5)
EST. GFR  (AFRICAN AMERICAN): >60 ML/MIN/1.73 M^2
EST. GFR  (NON AFRICAN AMERICAN): 58 ML/MIN/1.73 M^2
GLUCOSE SERPL-MCNC: 79 MG/DL (ref 70–110)
HCT VFR BLD AUTO: 47.1 % (ref 40–54)
HDLC SERPL-MCNC: 60 MG/DL (ref 40–75)
HDLC SERPL: 34.1 % (ref 20–50)
HGB BLD-MCNC: 14.7 G/DL (ref 14–18)
LDLC SERPL CALC-MCNC: 106.4 MG/DL (ref 63–159)
MCH RBC QN AUTO: 29.4 PG (ref 27–31)
MCHC RBC AUTO-ENTMCNC: 31.2 G/DL (ref 32–36)
MCV RBC AUTO: 94 FL (ref 82–98)
NONHDLC SERPL-MCNC: 116 MG/DL
PLATELET # BLD AUTO: 254 K/UL (ref 150–350)
PMV BLD AUTO: 11.1 FL (ref 9.2–12.9)
POTASSIUM SERPL-SCNC: 4.4 MMOL/L (ref 3.5–5.1)
PROT SERPL-MCNC: 7 G/DL (ref 6–8.4)
RBC # BLD AUTO: 5 M/UL (ref 4.6–6.2)
SODIUM SERPL-SCNC: 141 MMOL/L (ref 136–145)
TRIGL SERPL-MCNC: 48 MG/DL (ref 30–150)
WBC # BLD AUTO: 7.88 K/UL (ref 3.9–12.7)

## 2019-09-11 PROCEDURE — 99999 PR PBB SHADOW E&M-EST. PATIENT-LVL III: ICD-10-PCS | Mod: PBBFAC,HCNC,, | Performed by: FAMILY MEDICINE

## 2019-09-11 PROCEDURE — 99999 PR PBB SHADOW E&M-EST. PATIENT-LVL III: CPT | Mod: PBBFAC,HCNC,, | Performed by: FAMILY MEDICINE

## 2019-09-11 PROCEDURE — 1101F PT FALLS ASSESS-DOCD LE1/YR: CPT | Mod: HCNC,CPTII,S$GLB, | Performed by: FAMILY MEDICINE

## 2019-09-11 PROCEDURE — 99499 RISK ADDL DX/OHS AUDIT: ICD-10-PCS | Mod: S$GLB,,, | Performed by: FAMILY MEDICINE

## 2019-09-11 PROCEDURE — G0008 ADMIN INFLUENZA VIRUS VAC: HCPCS | Mod: HCNC,S$GLB,, | Performed by: FAMILY MEDICINE

## 2019-09-11 PROCEDURE — 36415 COLL VENOUS BLD VENIPUNCTURE: CPT | Mod: HCNC

## 2019-09-11 PROCEDURE — 99499 UNLISTED E&M SERVICE: CPT | Mod: S$GLB,,, | Performed by: FAMILY MEDICINE

## 2019-09-11 PROCEDURE — G0008 FLU VACCINE - HIGH DOSE (65+) PRESERVATIVE FREE IM: ICD-10-PCS | Mod: HCNC,S$GLB,, | Performed by: FAMILY MEDICINE

## 2019-09-11 PROCEDURE — 3080F PR MOST RECENT DIASTOLIC BLOOD PRESSURE >= 90 MM HG: ICD-10-PCS | Mod: HCNC,CPTII,S$GLB, | Performed by: FAMILY MEDICINE

## 2019-09-11 PROCEDURE — 80061 LIPID PANEL: CPT | Mod: HCNC

## 2019-09-11 PROCEDURE — 3080F DIAST BP >= 90 MM HG: CPT | Mod: HCNC,CPTII,S$GLB, | Performed by: FAMILY MEDICINE

## 2019-09-11 PROCEDURE — 90662 FLU VACCINE - HIGH DOSE (65+) PRESERVATIVE FREE IM: ICD-10-PCS | Mod: HCNC,S$GLB,, | Performed by: FAMILY MEDICINE

## 2019-09-11 PROCEDURE — 99214 PR OFFICE/OUTPT VISIT, EST, LEVL IV, 30-39 MIN: ICD-10-PCS | Mod: 25,HCNC,S$GLB, | Performed by: FAMILY MEDICINE

## 2019-09-11 PROCEDURE — 3077F SYST BP >= 140 MM HG: CPT | Mod: HCNC,CPTII,S$GLB, | Performed by: FAMILY MEDICINE

## 2019-09-11 PROCEDURE — 99214 OFFICE O/P EST MOD 30 MIN: CPT | Mod: 25,HCNC,S$GLB, | Performed by: FAMILY MEDICINE

## 2019-09-11 PROCEDURE — 90662 IIV NO PRSV INCREASED AG IM: CPT | Mod: HCNC,S$GLB,, | Performed by: FAMILY MEDICINE

## 2019-09-11 PROCEDURE — 1101F PR PT FALLS ASSESS DOC 0-1 FALLS W/OUT INJ PAST YR: ICD-10-PCS | Mod: HCNC,CPTII,S$GLB, | Performed by: FAMILY MEDICINE

## 2019-09-11 PROCEDURE — 80053 COMPREHEN METABOLIC PANEL: CPT | Mod: HCNC

## 2019-09-11 PROCEDURE — 85027 COMPLETE CBC AUTOMATED: CPT | Mod: HCNC

## 2019-09-11 PROCEDURE — 3077F PR MOST RECENT SYSTOLIC BLOOD PRESSURE >= 140 MM HG: ICD-10-PCS | Mod: HCNC,CPTII,S$GLB, | Performed by: FAMILY MEDICINE

## 2019-09-11 RX ORDER — LISINOPRIL 10 MG/1
10 TABLET ORAL DAILY
Qty: 90 TABLET | Refills: 3 | Status: SHIPPED | OUTPATIENT
Start: 2019-09-11 | End: 2019-10-14 | Stop reason: SDUPTHER

## 2019-09-11 NOTE — PROGRESS NOTES
Subjective:   Patient ID: Pravin Ferrari is a 78 y.o. male.  Chief Complaint:  Follow-up      Presents for annual physical exam.    Last visit March 2019.      Medical history for:  Hypertension.  Medications discontinued last visit due to acceptable blood pressure control off lisinopril 10 mg daily.  Today weight increased 10 lb.  Blood pressure elevated.  No active cardiac symptoms.    Hearing loss.  Referred to audiology.  Hearing aids highly effective.    History prostate cancer.  Followed by Dr. Seay.  No active  symptoms.  CBC, CMP, lipids stable October 2018 stable.    Colonoscopy this year normal.  No repeat indicated based on age.    Wife reports some concern over intermittent memory difficulty.  Patient denies any active problem.    Otherwise doing well.    No significant change or decline in overall health since last visit.    Routine health maintenance shows need for tetanus, shingles, and flu vaccine.      Current Outpatient Medications:     multivitamin (THERAGRAN) per tablet, Take 1 tablet by mouth once daily., Disp: , Rfl:     lisinopril 10 MG tablet, Take 1 tablet (10 mg total) by mouth once daily., Disp: 90 tablet, Rfl: 3    Review of Systems   Constitutional: Negative for chills, fatigue and fever.   HENT: Positive for hearing loss. Negative for congestion, ear pain, postnasal drip, rhinorrhea, sinus pressure and sore throat.    Eyes: Negative for visual disturbance.   Respiratory: Negative for cough, chest tightness, shortness of breath and wheezing.    Cardiovascular: Negative for chest pain, palpitations and leg swelling.   Gastrointestinal: Negative for abdominal pain, constipation, diarrhea, nausea and vomiting.   Endocrine: Negative for polydipsia, polyphagia and polyuria.   Genitourinary: Negative for difficulty urinating, dysuria, flank pain, frequency, hematuria and urgency.   Musculoskeletal: Negative for myalgias and neck pain.   Skin: Negative for rash.   Neurological: Negative  "for dizziness, syncope, weakness, light-headedness and headaches.   Hematological: Negative for adenopathy.   Psychiatric/Behavioral: Negative for agitation, behavioral problems, confusion, decreased concentration, dysphoric mood, hallucinations and sleep disturbance. The patient is not nervous/anxious and is not hyperactive.      Objective:   BP (!) 152/96 (BP Location: Left arm, Patient Position: Sitting, BP Method: Medium (Manual))   Pulse 78   Temp 99 °F (37.2 °C) (Tympanic)   Ht 5' 7.5" (1.715 m)   Wt 74.1 kg (163 lb 5.8 oz)   SpO2 96%   BMI 25.21 kg/m²     Physical Exam   Constitutional: He appears well-developed and well-nourished.   Blood pressure elevated.  Weight increased 10 lb.   Neck: No JVD present.   Cardiovascular: Normal rate, regular rhythm and normal heart sounds. Exam reveals no gallop and no friction rub.   No murmur heard.  Pulmonary/Chest: Effort normal and breath sounds normal. He has no wheezes. He has no rhonchi. He has no rales.   Abdominal: Soft. He exhibits no distension. There is no tenderness.   Musculoskeletal: He exhibits no edema.   Skin: Skin is warm, dry and intact. No rash noted.   Psychiatric: He has a normal mood and affect.   Nursing note and vitals reviewed.    Assessment:       ICD-10-CM ICD-9-CM   1. Annual physical exam Z00.00 V70.0   2. Essential hypertension I10 401.9   3. History of prostate cancer Z85.46 V10.46   4. Need for influenza vaccination Z23 V04.81     Plan:   Annual physical exam  Need for influenza vaccination  -     CBC Without Differential; Future; Expected date: 09/11/2019  -     Comprehensive metabolic panel; Future; Expected date: 09/11/2019  -     Lipid panel; Future; Expected date: 09/11/2019  -     Influenza - High Dose (65+) (PF) (IM)  Blood pressure elevated.  BMI 25.  Remainder exam stable.    Check labs.  Treat as indicated.    Flu vaccine today   Tetanus booster and shingles vaccine next visit.    Colonoscopy up-to-date.      Essential " hypertension  -     lisinopril 10 MG tablet; Take 1 tablet (10 mg total) by mouth once daily.  Dispense: 90 tablet; Refill: 3  Blood pressure elevated.  Not at goal.  Like weight gain related.    Restart lisinopril 10 mg daily.      History of prostate cancer  Follow up with Dr. Seay as scheduled.      Return to clinic 1 month

## 2019-10-14 ENCOUNTER — OFFICE VISIT (OUTPATIENT)
Dept: INTERNAL MEDICINE | Facility: CLINIC | Age: 79
End: 2019-10-14
Payer: MEDICARE

## 2019-10-14 VITALS
HEART RATE: 78 BPM | BODY MASS INDEX: 24.79 KG/M2 | DIASTOLIC BLOOD PRESSURE: 80 MMHG | OXYGEN SATURATION: 97 % | HEIGHT: 68 IN | SYSTOLIC BLOOD PRESSURE: 144 MMHG | WEIGHT: 163.56 LBS

## 2019-10-14 DIAGNOSIS — I10 ESSENTIAL HYPERTENSION: Primary | Chronic | ICD-10-CM

## 2019-10-14 PROCEDURE — 99999 PR PBB SHADOW E&M-EST. PATIENT-LVL III: ICD-10-PCS | Mod: PBBFAC,HCNC,, | Performed by: FAMILY MEDICINE

## 2019-10-14 PROCEDURE — 3077F SYST BP >= 140 MM HG: CPT | Mod: HCNC,CPTII,S$GLB, | Performed by: FAMILY MEDICINE

## 2019-10-14 PROCEDURE — 99999 PR PBB SHADOW E&M-EST. PATIENT-LVL III: CPT | Mod: PBBFAC,HCNC,, | Performed by: FAMILY MEDICINE

## 2019-10-14 PROCEDURE — 99214 OFFICE O/P EST MOD 30 MIN: CPT | Mod: HCNC,S$GLB,, | Performed by: FAMILY MEDICINE

## 2019-10-14 PROCEDURE — 99214 PR OFFICE/OUTPT VISIT, EST, LEVL IV, 30-39 MIN: ICD-10-PCS | Mod: HCNC,S$GLB,, | Performed by: FAMILY MEDICINE

## 2019-10-14 PROCEDURE — 3077F PR MOST RECENT SYSTOLIC BLOOD PRESSURE >= 140 MM HG: ICD-10-PCS | Mod: HCNC,CPTII,S$GLB, | Performed by: FAMILY MEDICINE

## 2019-10-14 PROCEDURE — 1101F PR PT FALLS ASSESS DOC 0-1 FALLS W/OUT INJ PAST YR: ICD-10-PCS | Mod: HCNC,CPTII,S$GLB, | Performed by: FAMILY MEDICINE

## 2019-10-14 PROCEDURE — 1101F PT FALLS ASSESS-DOCD LE1/YR: CPT | Mod: HCNC,CPTII,S$GLB, | Performed by: FAMILY MEDICINE

## 2019-10-14 PROCEDURE — 3079F DIAST BP 80-89 MM HG: CPT | Mod: HCNC,CPTII,S$GLB, | Performed by: FAMILY MEDICINE

## 2019-10-14 PROCEDURE — 3079F PR MOST RECENT DIASTOLIC BLOOD PRESSURE 80-89 MM HG: ICD-10-PCS | Mod: HCNC,CPTII,S$GLB, | Performed by: FAMILY MEDICINE

## 2019-10-14 RX ORDER — LISINOPRIL 20 MG/1
20 TABLET ORAL DAILY
Qty: 90 TABLET | Refills: 3 | Status: SHIPPED | OUTPATIENT
Start: 2019-10-14 | End: 2019-11-15 | Stop reason: SDUPTHER

## 2019-10-14 NOTE — PROGRESS NOTES
"Subjective:   Patient ID: Pravin Ferrari is a 78 y.o. male.  Chief Complaint:  Follow-up      One-month follow-up on hypertension.    Blood pressure elevated previous visit.  Restart lisinopril 10 mg daily.  Reports compliance.  Denies side effects.  However blood pressures at home greater than 140/90.  No active cardiac complaints.    Labs last visit with stable CBC, CMP,  Lpid panel with 10 year risk greater than 7.5%, but with controlled blood pressure approximates age related risk.  No new complaints or concerns today.      Current Outpatient Medications:     lisinopril (PRINIVIL,ZESTRIL) 20 MG tablet, Take 1 tablet (20 mg total) by mouth once daily., Disp: 90 tablet, Rfl: 3    multivitamin (THERAGRAN) per tablet, Take 1 tablet by mouth once daily., Disp: , Rfl:     Review of Systems   Constitutional: Negative for fatigue.   Eyes: Negative for visual disturbance.   Respiratory: Negative for cough, chest tightness and shortness of breath.    Cardiovascular: Negative for chest pain, palpitations and leg swelling.   Gastrointestinal: Negative for abdominal pain, diarrhea, nausea and vomiting.   Genitourinary: Negative for difficulty urinating.   Musculoskeletal: Negative for myalgias and neck pain.   Skin: Negative for rash.   Neurological: Negative for dizziness, syncope, weakness, light-headedness and headaches.   Psychiatric/Behavioral: Negative for sleep disturbance.     Objective:   BP (!) 144/80 (BP Location: Right arm, Patient Position: Sitting, BP Method: Medium (Manual))   Pulse 78   Ht 5' 7.5" (1.715 m)   Wt 74.2 kg (163 lb 9.3 oz)   SpO2 97%   BMI 25.24 kg/m²     Physical Exam   Constitutional: He appears well-developed and well-nourished.   Blood pressure elevated   Neck: No JVD present.   Cardiovascular: Normal rate, regular rhythm and normal heart sounds. Exam reveals no gallop and no friction rub.   No murmur heard.  Pulmonary/Chest: Effort normal and breath sounds normal. He has no wheezes. " He has no rhonchi. He has no rales.   Abdominal: Soft. He exhibits no distension. There is no tenderness.   Musculoskeletal: He exhibits no edema.   Skin: Skin is warm, dry and intact. No rash noted.   Psychiatric: He has a normal mood and affect.   Nursing note and vitals reviewed.    Assessment:       ICD-10-CM ICD-9-CM   1. Essential hypertension I10 401.9     Plan:   Essential hypertension  -     lisinopril (PRINIVIL,ZESTRIL) 20 MG tablet; Take 1 tablet (20 mg total) by mouth once daily.  Dispense: 90 tablet; Refill: 3    Uncontrolled.  BP not at goal despite restarting medication.    Increase lisinopril 20 mg daily.  Check blood pressure daily.    Return to clinic with readings in 1 month.    Additional medication adjustment as needed.    Patient and wife express understanding and agreement to the above plan.

## 2019-11-01 ENCOUNTER — PATIENT OUTREACH (OUTPATIENT)
Dept: ADMINISTRATIVE | Facility: HOSPITAL | Age: 79
End: 2019-11-01

## 2019-11-15 ENCOUNTER — OFFICE VISIT (OUTPATIENT)
Dept: INTERNAL MEDICINE | Facility: CLINIC | Age: 79
End: 2019-11-15
Payer: MEDICARE

## 2019-11-15 VITALS
SYSTOLIC BLOOD PRESSURE: 138 MMHG | HEIGHT: 68 IN | TEMPERATURE: 97 F | HEART RATE: 86 BPM | WEIGHT: 160.69 LBS | OXYGEN SATURATION: 96 % | DIASTOLIC BLOOD PRESSURE: 86 MMHG | BODY MASS INDEX: 24.35 KG/M2

## 2019-11-15 DIAGNOSIS — I10 ESSENTIAL HYPERTENSION: Primary | Chronic | ICD-10-CM

## 2019-11-15 DIAGNOSIS — Z23 NEED FOR PNEUMOCOCCAL VACCINATION: ICD-10-CM

## 2019-11-15 DIAGNOSIS — Z23 NEED FOR SHINGLES VACCINE: ICD-10-CM

## 2019-11-15 DIAGNOSIS — Z23 NEED FOR TDAP VACCINATION: ICD-10-CM

## 2019-11-15 PROCEDURE — 99999 PR PBB SHADOW E&M-EST. PATIENT-LVL IV: CPT | Mod: PBBFAC,HCNC,, | Performed by: FAMILY MEDICINE

## 2019-11-15 PROCEDURE — 1101F PR PT FALLS ASSESS DOC 0-1 FALLS W/OUT INJ PAST YR: ICD-10-PCS | Mod: HCNC,CPTII,S$GLB, | Performed by: FAMILY MEDICINE

## 2019-11-15 PROCEDURE — 99214 OFFICE O/P EST MOD 30 MIN: CPT | Mod: HCNC,25,S$GLB, | Performed by: FAMILY MEDICINE

## 2019-11-15 PROCEDURE — 3075F PR MOST RECENT SYSTOLIC BLOOD PRESS GE 130-139MM HG: ICD-10-PCS | Mod: HCNC,CPTII,S$GLB, | Performed by: FAMILY MEDICINE

## 2019-11-15 PROCEDURE — 3079F PR MOST RECENT DIASTOLIC BLOOD PRESSURE 80-89 MM HG: ICD-10-PCS | Mod: HCNC,CPTII,S$GLB, | Performed by: FAMILY MEDICINE

## 2019-11-15 PROCEDURE — 1101F PT FALLS ASSESS-DOCD LE1/YR: CPT | Mod: HCNC,CPTII,S$GLB, | Performed by: FAMILY MEDICINE

## 2019-11-15 PROCEDURE — 3075F SYST BP GE 130 - 139MM HG: CPT | Mod: HCNC,CPTII,S$GLB, | Performed by: FAMILY MEDICINE

## 2019-11-15 PROCEDURE — 99214 PR OFFICE/OUTPT VISIT, EST, LEVL IV, 30-39 MIN: ICD-10-PCS | Mod: HCNC,25,S$GLB, | Performed by: FAMILY MEDICINE

## 2019-11-15 PROCEDURE — 3079F DIAST BP 80-89 MM HG: CPT | Mod: HCNC,CPTII,S$GLB, | Performed by: FAMILY MEDICINE

## 2019-11-15 PROCEDURE — 90732 PNEUMOCOCCAL POLYSACCHARIDE VACCINE 23-VALENT =>2YO SQ IM: ICD-10-PCS | Mod: HCNC,S$GLB,, | Performed by: FAMILY MEDICINE

## 2019-11-15 PROCEDURE — 99499 UNLISTED E&M SERVICE: CPT | Mod: HCNC,S$GLB,, | Performed by: FAMILY MEDICINE

## 2019-11-15 PROCEDURE — 99999 PR PBB SHADOW E&M-EST. PATIENT-LVL IV: ICD-10-PCS | Mod: PBBFAC,HCNC,, | Performed by: FAMILY MEDICINE

## 2019-11-15 PROCEDURE — G0009 PNEUMOCOCCAL POLYSACCHARIDE VACCINE 23-VALENT =>2YO SQ IM: ICD-10-PCS | Mod: HCNC,S$GLB,, | Performed by: FAMILY MEDICINE

## 2019-11-15 PROCEDURE — 90732 PPSV23 VACC 2 YRS+ SUBQ/IM: CPT | Mod: HCNC,S$GLB,, | Performed by: FAMILY MEDICINE

## 2019-11-15 PROCEDURE — G0009 ADMIN PNEUMOCOCCAL VACCINE: HCPCS | Mod: HCNC,S$GLB,, | Performed by: FAMILY MEDICINE

## 2019-11-15 PROCEDURE — 99499 RISK ADDL DX/OHS AUDIT: ICD-10-PCS | Mod: HCNC,S$GLB,, | Performed by: FAMILY MEDICINE

## 2019-11-15 RX ORDER — LISINOPRIL 40 MG/1
40 TABLET ORAL DAILY
Qty: 90 TABLET | Refills: 3 | Status: SHIPPED | OUTPATIENT
Start: 2019-11-15 | End: 2020-10-07 | Stop reason: SDUPTHER

## 2019-11-15 NOTE — PROGRESS NOTES
"Subjective:   Patient ID: Pravin Ferrari is a 78 y.o. male.  Chief Complaint:  Follow-up      Patient presents follow-up on hypertension.    Last visit increase lisinopril 20 mg daily.  Reports compliant.  Denies side effects.  Blood pressure improved, but still with some systolic readings greater than 140.    No new complaints or concerns today  Routine health maintenance needs include pneumonia vaccine, tetanus booster, and shingles vaccine.    Current Outpatient Medications:     lisinopril (PRINIVIL,ZESTRIL) 40 MG tablet, Take 1 tablet (40 mg total) by mouth once daily., Disp: 90 tablet, Rfl: 3    multivitamin (THERAGRAN) per tablet, Take 1 tablet by mouth once daily., Disp: , Rfl:     Review of Systems   Constitutional: Negative for fatigue.   Eyes: Negative for visual disturbance.   Respiratory: Negative for cough, chest tightness and shortness of breath.    Cardiovascular: Negative for chest pain, palpitations and leg swelling.   Gastrointestinal: Negative for abdominal pain, diarrhea, nausea and vomiting.   Genitourinary: Negative for difficulty urinating.   Musculoskeletal: Negative for myalgias and neck pain.   Skin: Negative for rash.   Neurological: Negative for dizziness, syncope, weakness, light-headedness and headaches.   Psychiatric/Behavioral: Negative for sleep disturbance.     Objective:   /86 (BP Location: Left arm, Patient Position: Sitting, BP Method: Medium (Manual))   Pulse 86   Temp 97.2 °F (36.2 °C) (Tympanic)   Ht 5' 7.5" (1.715 m)   Wt 72.9 kg (160 lb 11.5 oz)   SpO2 96%   BMI 24.80 kg/m²     Physical Exam   Constitutional: Vital signs are normal. He appears well-developed and well-nourished.   Neck: No JVD present.   Cardiovascular: Normal rate, regular rhythm and normal heart sounds. Exam reveals no gallop and no friction rub.   No murmur heard.  Pulmonary/Chest: Effort normal and breath sounds normal. He has no wheezes. He has no rhonchi. He has no rales.   Abdominal: " Soft. He exhibits no distension. There is no tenderness.   Musculoskeletal: He exhibits no edema.   Skin: Skin is warm, dry and intact. No rash noted.   Psychiatric: He has a normal mood and affect.   Nursing note and vitals reviewed.    Assessment:       ICD-10-CM ICD-9-CM   1. Essential hypertension I10 401.9   2. Need for Tdap vaccination Z23 V06.1   3. Need for pneumococcal vaccination Z23 V03.82   4. Need for shingles vaccine Z23 V04.89     Plan:   Essential hypertension  -     lisinopril (PRINIVIL,ZESTRIL) 40 MG tablet; Take 1 tablet (40 mg total) by mouth once daily.  Dispense: 90 tablet; Refill: 3  Improved/ controlled.  Still with some readings greater than 140/90   Increase lisinopril 40 mg daily.    RHM  -     diphth,pertus,acell,,tetanus (BOOSTRIX) 2.5-8-5 Lf-mcg-Lf/0.5mL Syrg injection; Inject 0.5 mLs into the muscle once. for 1 dose  Dispense: 0.5 mL; Refill: 0  -     (In Office Administered) Pneumococcal Polysaccharide Vaccine (23 Valent) (SQ/IM)  -     varicella-zoster [id number], PF, (SHINGRIX, PF,) 50 mcg/0.5 mL injection; Inject 0.5 mLs into the muscle once. for 1 dose  Dispense: 0.5 mL; Refill: 0  Pneumonia vaccine office   Tetanus and shingles vaccine through pharmacy     Return to clinic 3 months or sooner as needed.

## 2020-03-27 ENCOUNTER — TELEPHONE (OUTPATIENT)
Dept: INTERNAL MEDICINE | Facility: CLINIC | Age: 80
End: 2020-03-27

## 2020-03-27 NOTE — TELEPHONE ENCOUNTER
As long as there are no other significant symptoms, likely not anything significant.      If he is having true vertigo with the spinning sensation like he has had in the past, he can take over-the-counter Benadryl to help or I can see resend a prescription for meclizine /Antivert.      If it is not a true spinning sensation, and it is more lightheaded with weakness and feeling like he is going to pass out, he needs to rest today and increase his hydration and hopefully it should improve.    Unfortunately if it gets worse, he will need to go to the ER.

## 2020-03-27 NOTE — TELEPHONE ENCOUNTER
Spoke with patient when he woke up this morning with dizziness, and light headed . Advise !     all other ROS negative except as per HPI

## 2020-03-27 NOTE — TELEPHONE ENCOUNTER
----- Message from Melissa Lewis sent at 3/27/2020  8:18 AM CDT -----  Contact: Radha/wife [phone]  Type:  Needs Medical Advice    Who Called: Radha/wife   Symptoms (please be specific): head feels full/ feels off balance   How long has patient had these symptoms:  This morning  Pharmacy name and phone #:      [id number] - [place] - [address] [facility]  [address] [facility]  [place] [address]  Phone: [phone] Fax: [phone]    Would the patient rather a call back or a response via [facility]? Call back   Best Call Back Number: [phone]  Additional Information:

## 2020-10-07 ENCOUNTER — LAB VISIT (OUTPATIENT)
Dept: LAB | Facility: HOSPITAL | Age: 80
End: 2020-10-07
Attending: FAMILY MEDICINE
Payer: MEDICARE

## 2020-10-07 ENCOUNTER — OFFICE VISIT (OUTPATIENT)
Dept: INTERNAL MEDICINE | Facility: CLINIC | Age: 80
End: 2020-10-07
Payer: MEDICARE

## 2020-10-07 VITALS
HEART RATE: 67 BPM | TEMPERATURE: 97 F | HEIGHT: 68 IN | SYSTOLIC BLOOD PRESSURE: 122 MMHG | OXYGEN SATURATION: 97 % | WEIGHT: 157.19 LBS | BODY MASS INDEX: 23.82 KG/M2 | DIASTOLIC BLOOD PRESSURE: 74 MMHG

## 2020-10-07 DIAGNOSIS — Z23 NEED FOR INFLUENZA VACCINATION: ICD-10-CM

## 2020-10-07 DIAGNOSIS — Z11.59 NEED FOR HEPATITIS C SCREENING TEST: ICD-10-CM

## 2020-10-07 DIAGNOSIS — I10 ESSENTIAL HYPERTENSION: Chronic | ICD-10-CM

## 2020-10-07 DIAGNOSIS — I10 ESSENTIAL HYPERTENSION: Primary | Chronic | ICD-10-CM

## 2020-10-07 LAB
ALBUMIN SERPL BCP-MCNC: 3.9 G/DL (ref 3.5–5.2)
ALP SERPL-CCNC: 60 U/L (ref 55–135)
ALT SERPL W/O P-5'-P-CCNC: 14 U/L (ref 10–44)
ANION GAP SERPL CALC-SCNC: 8 MMOL/L (ref 8–16)
AST SERPL-CCNC: 23 U/L (ref 10–40)
BILIRUB SERPL-MCNC: 0.7 MG/DL (ref 0.1–1)
BUN SERPL-MCNC: 20 MG/DL (ref 8–23)
CALCIUM SERPL-MCNC: 9.3 MG/DL (ref 8.7–10.5)
CHLORIDE SERPL-SCNC: 103 MMOL/L (ref 95–110)
CHOLEST SERPL-MCNC: 175 MG/DL (ref 120–199)
CHOLEST/HDLC SERPL: 2.9 {RATIO} (ref 2–5)
CO2 SERPL-SCNC: 29 MMOL/L (ref 23–29)
CREAT SERPL-MCNC: 1.2 MG/DL (ref 0.5–1.4)
ERYTHROCYTE [DISTWIDTH] IN BLOOD BY AUTOMATED COUNT: 13 % (ref 11.5–14.5)
EST. GFR  (AFRICAN AMERICAN): >60 ML/MIN/1.73 M^2
EST. GFR  (NON AFRICAN AMERICAN): 57.6 ML/MIN/1.73 M^2
GLUCOSE SERPL-MCNC: 95 MG/DL (ref 70–110)
HCT VFR BLD AUTO: 46.2 % (ref 40–54)
HDLC SERPL-MCNC: 61 MG/DL (ref 40–75)
HDLC SERPL: 34.9 % (ref 20–50)
HGB BLD-MCNC: 14.8 G/DL (ref 14–18)
LDLC SERPL CALC-MCNC: 104.6 MG/DL (ref 63–159)
MCH RBC QN AUTO: 30.1 PG (ref 27–31)
MCHC RBC AUTO-ENTMCNC: 32 G/DL (ref 32–36)
MCV RBC AUTO: 94 FL (ref 82–98)
NONHDLC SERPL-MCNC: 114 MG/DL
PLATELET # BLD AUTO: 251 K/UL (ref 150–350)
PMV BLD AUTO: 11.8 FL (ref 9.2–12.9)
POTASSIUM SERPL-SCNC: 5.1 MMOL/L (ref 3.5–5.1)
PROT SERPL-MCNC: 7.3 G/DL (ref 6–8.4)
RBC # BLD AUTO: 4.92 M/UL (ref 4.6–6.2)
SODIUM SERPL-SCNC: 140 MMOL/L (ref 136–145)
TRIGL SERPL-MCNC: 47 MG/DL (ref 30–150)
WBC # BLD AUTO: 8.33 K/UL (ref 3.9–12.7)

## 2020-10-07 PROCEDURE — 85027 COMPLETE CBC AUTOMATED: CPT | Mod: HCNC

## 2020-10-07 PROCEDURE — 1126F AMNT PAIN NOTED NONE PRSNT: CPT | Mod: HCNC,S$GLB,, | Performed by: FAMILY MEDICINE

## 2020-10-07 PROCEDURE — 1159F MED LIST DOCD IN RCRD: CPT | Mod: HCNC,S$GLB,, | Performed by: FAMILY MEDICINE

## 2020-10-07 PROCEDURE — 99214 PR OFFICE/OUTPT VISIT, EST, LEVL IV, 30-39 MIN: ICD-10-PCS | Mod: HCNC,25,S$GLB, | Performed by: FAMILY MEDICINE

## 2020-10-07 PROCEDURE — 1101F PR PT FALLS ASSESS DOC 0-1 FALLS W/OUT INJ PAST YR: ICD-10-PCS | Mod: HCNC,CPTII,S$GLB, | Performed by: FAMILY MEDICINE

## 2020-10-07 PROCEDURE — 3078F DIAST BP <80 MM HG: CPT | Mod: HCNC,CPTII,S$GLB, | Performed by: FAMILY MEDICINE

## 2020-10-07 PROCEDURE — 90694 VACC AIIV4 NO PRSRV 0.5ML IM: CPT | Mod: HCNC,S$GLB,, | Performed by: FAMILY MEDICINE

## 2020-10-07 PROCEDURE — 36415 COLL VENOUS BLD VENIPUNCTURE: CPT | Mod: HCNC

## 2020-10-07 PROCEDURE — G0008 ADMIN INFLUENZA VIRUS VAC: HCPCS | Mod: HCNC,S$GLB,, | Performed by: FAMILY MEDICINE

## 2020-10-07 PROCEDURE — 3074F SYST BP LT 130 MM HG: CPT | Mod: HCNC,CPTII,S$GLB, | Performed by: FAMILY MEDICINE

## 2020-10-07 PROCEDURE — 99999 PR PBB SHADOW E&M-EST. PATIENT-LVL IV: ICD-10-PCS | Mod: PBBFAC,HCNC,, | Performed by: FAMILY MEDICINE

## 2020-10-07 PROCEDURE — 1159F PR MEDICATION LIST DOCUMENTED IN MEDICAL RECORD: ICD-10-PCS | Mod: HCNC,S$GLB,, | Performed by: FAMILY MEDICINE

## 2020-10-07 PROCEDURE — 1126F PR PAIN SEVERITY QUANTIFIED, NO PAIN PRESENT: ICD-10-PCS | Mod: HCNC,S$GLB,, | Performed by: FAMILY MEDICINE

## 2020-10-07 PROCEDURE — 99999 PR PBB SHADOW E&M-EST. PATIENT-LVL IV: CPT | Mod: PBBFAC,HCNC,, | Performed by: FAMILY MEDICINE

## 2020-10-07 PROCEDURE — 90694 FLU VACCINE - QUADRIVALENT - ADJUVANTED: ICD-10-PCS | Mod: HCNC,S$GLB,, | Performed by: FAMILY MEDICINE

## 2020-10-07 PROCEDURE — 80061 LIPID PANEL: CPT | Mod: HCNC

## 2020-10-07 PROCEDURE — G0008 FLU VACCINE - QUADRIVALENT - ADJUVANTED: ICD-10-PCS | Mod: HCNC,S$GLB,, | Performed by: FAMILY MEDICINE

## 2020-10-07 PROCEDURE — 80053 COMPREHEN METABOLIC PANEL: CPT | Mod: HCNC

## 2020-10-07 PROCEDURE — 99214 OFFICE O/P EST MOD 30 MIN: CPT | Mod: HCNC,25,S$GLB, | Performed by: FAMILY MEDICINE

## 2020-10-07 PROCEDURE — 3078F PR MOST RECENT DIASTOLIC BLOOD PRESSURE < 80 MM HG: ICD-10-PCS | Mod: HCNC,CPTII,S$GLB, | Performed by: FAMILY MEDICINE

## 2020-10-07 PROCEDURE — 3074F PR MOST RECENT SYSTOLIC BLOOD PRESSURE < 130 MM HG: ICD-10-PCS | Mod: HCNC,CPTII,S$GLB, | Performed by: FAMILY MEDICINE

## 2020-10-07 PROCEDURE — 86803 HEPATITIS C AB TEST: CPT | Mod: HCNC

## 2020-10-07 PROCEDURE — 1101F PT FALLS ASSESS-DOCD LE1/YR: CPT | Mod: HCNC,CPTII,S$GLB, | Performed by: FAMILY MEDICINE

## 2020-10-07 RX ORDER — LISINOPRIL 40 MG/1
40 TABLET ORAL DAILY
Qty: 90 TABLET | Refills: 3 | Status: SHIPPED | OUTPATIENT
Start: 2020-10-07 | End: 2021-02-02

## 2020-10-07 NOTE — PROGRESS NOTES
Subjective:   Patient ID: Pravin Ferrari is a 79 y.o. male.  Chief Complaint:  Follow-up (6 mo)    Patient presents follow-up on hypertension.    Last visit increase lisinopril 40 mg daily.  Reports compliant.  Denies side effects.  Blood pressure improved. No shortness of breath or swelling.  Labs September 2019 with stable CBC, CMP, and lipids.    Routine health maintenance needs include   Tetanus booster, shingles vaccine , and flu vaccine.  Hepatitis-C screening    Hearing loss stable.    Previous concerns about some cognitive deficit  and possible early dementia, but remains stable.  No new complaints concerns today.      Current Outpatient Medications:     lisinopriL (PRINIVIL,ZESTRIL) 40 MG tablet, Take 1 tablet (40 mg total) by mouth once daily., Disp: 90 tablet, Rfl: 3    multivitamin (THERAGRAN) per tablet, Take 1 tablet by mouth once daily., Disp: , Rfl:     Review of Systems   Constitutional: Negative for chills, fatigue and fever.   HENT: Positive for hearing loss. Negative for congestion, dental problem, ear discharge, ear pain, postnasal drip, rhinorrhea, sinus pressure, sinus pain, sore throat and trouble swallowing.    Eyes: Negative for pain, redness and visual disturbance.   Respiratory: Negative for cough, chest tightness, shortness of breath and wheezing.    Cardiovascular: Negative for chest pain, palpitations and leg swelling.   Gastrointestinal: Negative for abdominal pain, constipation, diarrhea, nausea and vomiting.   Endocrine: Negative for polydipsia, polyphagia and polyuria.   Genitourinary: Negative for difficulty urinating, dysuria, flank pain, frequency, hematuria and urgency.   Musculoskeletal: Negative for myalgias and neck pain.   Skin: Negative for rash.   Neurological: Negative for dizziness, syncope, weakness, light-headedness and headaches.   Hematological: Negative for adenopathy.   Psychiatric/Behavioral: Negative for sleep disturbance. The patient is not nervous/anxious.   "    Objective:   /74 (BP Location: Left arm, Patient Position: Sitting, BP Method: Medium (Manual))   Pulse 67   Temp 97.4 °F (36.3 °C) (Tympanic)   Ht 5' 7.5" (1.715 m)   Wt 71.3 kg (157 lb 3 oz)   SpO2 97%   BMI 24.26 kg/m²     Physical Exam  Vitals signs and nursing note reviewed.   Constitutional:       Appearance: He is well-developed and normal weight.   Neck:      Vascular: No carotid bruit or JVD.   Cardiovascular:      Rate and Rhythm: Normal rate and regular rhythm.      Pulses:           Radial pulses are 2+ on the right side and 2+ on the left side.      Heart sounds: Normal heart sounds. No murmur. No friction rub. No gallop.    Pulmonary:      Effort: Pulmonary effort is normal.      Breath sounds: Normal breath sounds. No wheezing, rhonchi or rales.   Abdominal:      General: There is no distension.      Palpations: Abdomen is soft.      Tenderness: There is no abdominal tenderness.   Musculoskeletal:      Right lower leg: No edema.      Left lower leg: No edema.   Skin:     General: Skin is warm and dry.      Findings: No rash.   Neurological:      Coordination: Coordination is intact.      Gait: Gait is intact.   Psychiatric:         Attention and Perception: Attention normal.         Mood and Affect: Mood normal.         Speech: Speech normal.         Behavior: Behavior normal.         Thought Content: Thought content normal.         Cognition and Memory: Cognition normal.         Judgment: Judgment normal.       Assessment:       ICD-10-CM ICD-9-CM   1. Essential hypertension  I10 401.9   2. Need for hepatitis C screening test  Z11.59 V73.89   3. Need for influenza vaccination  Z23 V04.81     Plan:   Essential hypertension  -     lisinopriL (PRINIVIL,ZESTRIL) 40 MG tablet; Take 1 tablet (40 mg total) by mouth once daily.  Dispense: 90 tablet; Refill: 3  -     CBC Without Differential; Future; Expected date: 10/07/2020  -     Comprehensive Metabolic Panel; Future; Expected date: " 10/07/2020  -     Lipid Panel; Future; Expected date: 10/07/2020  Controlled.  BP at goal.  Asymptomatic.    Continue lisinopril 40 mg daily.    Check labs.  Treat as indicated.      RHM  -     Hepatitis C Antibody; Future; Expected date: 10/07/2020  -     Influenza - Quadrivalent (Adjuvanted)    Return to clinic 6 months or sooner as needed.

## 2020-10-09 PROBLEM — N18.9 CKD (CHRONIC KIDNEY DISEASE): Status: ACTIVE | Noted: 2020-10-09

## 2020-10-09 LAB — HCV AB SERPL QL IA: NEGATIVE

## 2020-11-23 ENCOUNTER — TELEPHONE (OUTPATIENT)
Dept: INTERNAL MEDICINE | Facility: CLINIC | Age: 80
End: 2020-11-23

## 2020-11-23 NOTE — TELEPHONE ENCOUNTER
----- Message from Jenni Lowe sent at 11/23/2020  7:28 AM CST -----  Contact: pt  Type:  Needs Medical Advice    Who Called: pt   Symptoms (please be specific): stitches placed in ear   How long has patient had these symptoms:  this past weekend   Pharmacy name and phone #:     Would the patient rather a call back or a response via [facility]? Phone  Best Call Back Number: [phone]  Additional Information: wants to be checked for infection in the ear

## 2020-11-24 ENCOUNTER — OFFICE VISIT (OUTPATIENT)
Dept: INTERNAL MEDICINE | Facility: CLINIC | Age: 80
End: 2020-11-24
Payer: MEDICARE

## 2020-11-24 VITALS
SYSTOLIC BLOOD PRESSURE: 124 MMHG | BODY MASS INDEX: 24.43 KG/M2 | DIASTOLIC BLOOD PRESSURE: 88 MMHG | TEMPERATURE: 97 F | WEIGHT: 158.31 LBS | OXYGEN SATURATION: 99 % | HEART RATE: 91 BPM

## 2020-11-24 DIAGNOSIS — S01.312D LACERATION OF LEFT EAR, SUBSEQUENT ENCOUNTER: Primary | ICD-10-CM

## 2020-11-24 PROCEDURE — 1159F MED LIST DOCD IN RCRD: CPT | Mod: HCNC,S$GLB,, | Performed by: FAMILY MEDICINE

## 2020-11-24 PROCEDURE — 3288F PR FALLS RISK ASSESSMENT DOCUMENTED: ICD-10-PCS | Mod: HCNC,CPTII,S$GLB, | Performed by: FAMILY MEDICINE

## 2020-11-24 PROCEDURE — 1126F AMNT PAIN NOTED NONE PRSNT: CPT | Mod: HCNC,S$GLB,, | Performed by: FAMILY MEDICINE

## 2020-11-24 PROCEDURE — 3074F PR MOST RECENT SYSTOLIC BLOOD PRESSURE < 130 MM HG: ICD-10-PCS | Mod: HCNC,CPTII,S$GLB, | Performed by: FAMILY MEDICINE

## 2020-11-24 PROCEDURE — 1159F PR MEDICATION LIST DOCUMENTED IN MEDICAL RECORD: ICD-10-PCS | Mod: HCNC,S$GLB,, | Performed by: FAMILY MEDICINE

## 2020-11-24 PROCEDURE — 1101F PR PT FALLS ASSESS DOC 0-1 FALLS W/OUT INJ PAST YR: ICD-10-PCS | Mod: HCNC,CPTII,S$GLB, | Performed by: FAMILY MEDICINE

## 2020-11-24 PROCEDURE — 99999 PR PBB SHADOW E&M-EST. PATIENT-LVL III: ICD-10-PCS | Mod: PBBFAC,HCNC,, | Performed by: FAMILY MEDICINE

## 2020-11-24 PROCEDURE — 99999 PR PBB SHADOW E&M-EST. PATIENT-LVL III: CPT | Mod: PBBFAC,HCNC,, | Performed by: FAMILY MEDICINE

## 2020-11-24 PROCEDURE — 1101F PT FALLS ASSESS-DOCD LE1/YR: CPT | Mod: HCNC,CPTII,S$GLB, | Performed by: FAMILY MEDICINE

## 2020-11-24 PROCEDURE — 3288F FALL RISK ASSESSMENT DOCD: CPT | Mod: HCNC,CPTII,S$GLB, | Performed by: FAMILY MEDICINE

## 2020-11-24 PROCEDURE — 3079F PR MOST RECENT DIASTOLIC BLOOD PRESSURE 80-89 MM HG: ICD-10-PCS | Mod: HCNC,CPTII,S$GLB, | Performed by: FAMILY MEDICINE

## 2020-11-24 PROCEDURE — 3074F SYST BP LT 130 MM HG: CPT | Mod: HCNC,CPTII,S$GLB, | Performed by: FAMILY MEDICINE

## 2020-11-24 PROCEDURE — 99213 PR OFFICE/OUTPT VISIT, EST, LEVL III, 20-29 MIN: ICD-10-PCS | Mod: HCNC,S$GLB,, | Performed by: FAMILY MEDICINE

## 2020-11-24 PROCEDURE — 3079F DIAST BP 80-89 MM HG: CPT | Mod: HCNC,CPTII,S$GLB, | Performed by: FAMILY MEDICINE

## 2020-11-24 PROCEDURE — 99213 OFFICE O/P EST LOW 20 MIN: CPT | Mod: HCNC,S$GLB,, | Performed by: FAMILY MEDICINE

## 2020-11-24 PROCEDURE — 1126F PR PAIN SEVERITY QUANTIFIED, NO PAIN PRESENT: ICD-10-PCS | Mod: HCNC,S$GLB,, | Performed by: FAMILY MEDICINE

## 2020-11-24 RX ORDER — CEPHALEXIN 500 MG/1
CAPSULE ORAL
COMMUNITY
Start: 2020-11-22 | End: 2021-05-05

## 2020-11-24 RX ORDER — MUPIROCIN 20 MG/G
OINTMENT TOPICAL 2 TIMES DAILY
Qty: 30 G | Refills: 0 | Status: SHIPPED | OUTPATIENT
Start: 2020-11-24 | End: 2021-05-05

## 2020-11-24 NOTE — PATIENT INSTRUCTIONS
Face Laceration: Suture or Tape  A laceration is a cut through the skin. This will require stitches if it is deep. Minor cuts may be treated with surgical tape.    Home care  · Your healthcare provider may prescribe an antibiotic. This is to help prevent infection. Follow all instructions for taking this medicine. Take the medicine every day until it is gone or you are told to stop. You should not have any left over.  · The healthcare provider may prescribe medicines for pain. Follow instructions for taking them.  · Follow the healthcare providers instructions on how to care for the cut.  · Wash your hands with soap and warm water before and after caring for the cut. This helps prevent infection.  · If a bandage was applied and it becomes wet or dirty, replace it. Otherwise, leave it in place for the first 24 hours, then change it once a day or as directed.  · If sutures were used, clean the wound daily:  ¨ After removing the bandage, wash the area with soap and water. Use a wet cotton swab to loosen and remove any blood or crust that forms.  ¨ After cleaning, keep the wound clean and dry. Talk with your doctor before applying any antibiotic ointment to the wound. Reapply a fresh bandage.  ¨ You may remove the bandage to shower as usual after the first 24 hours, but do not soak the area in water (no swimming) until the sutures are removed.  · If surgical tape was used, keep the area clean and dry. If it becomes wet, blot it dry with a towel.  · Most facial skin wounds heal without problems. However, an infection sometimes occurs despite proper treatment. Therefore, watch for the signs of infection listed below.  Follow-up care  Follow up with your healthcare provider as advised. Be sure to return for suture removal as directed. Ask your provider how long sutures should remain in place. If surgical tape closures were used, you may remove them yourself when your provider recommends if they have not fallen off on  their own.  When to seek medical advice  Call your healthcare provider right away if any of these occur:  · Wound bleeding not controlled by direct pressure  · Signs of infection, including increasing pain in the wound, increasing wound redness or swelling, or pus or bad odor coming from the wound  · Fever of 100.4°F (38ºC) or higher or as directed by your healthcare provider  · Stitches come apart or fall out or surgical tape falls off before 5 days  · Wound edges re-open  · Wound changes colors  · Numbness around the wound   Date Last Reviewed: 6/14/2015  © 8185-2152 [employer]. [address] [place], [place] [address]. All rights reserved. This information is not intended as a substitute for professional medical care. Always follow your healthcare professional's instructions.

## 2020-11-24 NOTE — PROGRESS NOTES
Subjective:   Patient ID: Pravin Ferrari is a 79 y.o. male.  Chief Complaint:  Follow-up    Presents for follow-up of left ear and scalp laceration with suture placement over the weekend.  Sutures placed in ER 4 days ago.    Head tetanus booster at that time.    Also placed on Keflex 500 mg 3 times a day  Reports compliance with antibiotic.    Has not done anything particular wound since sutures placed.  Questions when they can be removed.    Pain is minimal if any at all.    Previously had hearing loss, but no significant change.    No fever.    No other constitutional symptoms.      Current Outpatient Medications:     cephALEXin (KEFLEX) 500 MG capsule, , Disp: , Rfl:     lisinopriL (PRINIVIL,ZESTRIL) 40 MG tablet, Take 1 tablet (40 mg total) by mouth once daily., Disp: 90 tablet, Rfl: 3    multivitamin (THERAGRAN) per tablet, Take 1 tablet by mouth once daily., Disp: , Rfl:     mupirocin (BACTROBAN) 2 % ointment, Apply topically 2 (two) times daily., Disp: 30 g, Rfl: 0    Review of Systems   Constitutional: Negative for chills, fatigue and fever.   Respiratory: Negative for cough and shortness of breath.    Cardiovascular: Negative for chest pain and leg swelling.   Gastrointestinal: Negative for abdominal pain, diarrhea, nausea and vomiting.   Genitourinary: Negative for difficulty urinating.   Musculoskeletal: Negative for back pain, myalgias and neck pain.   Skin: Positive for wound. Negative for rash.   Psychiatric/Behavioral: Negative for sleep disturbance. The patient is not nervous/anxious.      Objective:   /88 (BP Location: Left arm, Patient Position: Sitting, BP Method: Medium (Manual))   Pulse 91   Temp 97.3 °F (36.3 °C) (Tympanic)   Wt 71.8 kg (158 lb 4.6 oz)   SpO2 99%   BMI 24.43 kg/m²     Physical Exam  Vitals signs and nursing note reviewed.   Constitutional:       General: He is not in acute distress.     Appearance: Normal appearance. He is well-developed and normal weight.    Neck:      Musculoskeletal: Full passive range of motion without pain, normal range of motion and neck supple.   Cardiovascular:      Rate and Rhythm: Normal rate and regular rhythm.   Pulmonary:      Effort: Pulmonary effort is normal. No tachypnea, accessory muscle usage or respiratory distress.   Musculoskeletal:      Right lower leg: No edema.      Left lower leg: No edema.   Skin:     General: Skin is warm and dry.      Findings: Laceration present. No rash.      Comments:   Left upper ear with healing laceration with multiple simple interrupted nylon sutures in place.    Also with laceration posterior regular with multiple simple interrupted nylon sutures in place.    Once cleansed, area is healing well.    No dehiscence.    No surrounding redness or drainage  No suspicion for secondary bacterial infection.   Neurological:      Coordination: Coordination is intact. Coordination normal.      Gait: Gait is intact. Gait normal.   Psychiatric:         Attention and Perception: Attention normal.         Mood and Affect: Mood normal.       Assessment:       ICD-10-CM ICD-9-CM   1. Laceration of left ear, subsequent encounter  S01.312D V58.89     872.8     Plan:   Laceration of left ear, subsequent encounter  -     mupirocin (BACTROBAN) 2 % ointment; Apply topically 2 (two) times daily.  Dispense: 30 g; Refill: 0    Healing well.    Too soon for suture removal.    Recommend cleanse twice a day with soap and water  Apply antibiotic ointment twice a day   Return in 3 days for suture removal.

## 2020-11-27 ENCOUNTER — OFFICE VISIT (OUTPATIENT)
Dept: INTERNAL MEDICINE | Facility: CLINIC | Age: 80
End: 2020-11-27
Payer: MEDICARE

## 2020-11-27 VITALS
TEMPERATURE: 98 F | WEIGHT: 157.44 LBS | BODY MASS INDEX: 24.29 KG/M2 | HEART RATE: 86 BPM | SYSTOLIC BLOOD PRESSURE: 136 MMHG | OXYGEN SATURATION: 98 % | DIASTOLIC BLOOD PRESSURE: 88 MMHG

## 2020-11-27 DIAGNOSIS — Z48.02 VISIT FOR SUTURE REMOVAL: ICD-10-CM

## 2020-11-27 DIAGNOSIS — S01.312D LACERATION OF LEFT EAR, SUBSEQUENT ENCOUNTER: Primary | ICD-10-CM

## 2020-11-27 PROCEDURE — 3288F FALL RISK ASSESSMENT DOCD: CPT | Mod: HCNC,CPTII,S$GLB, | Performed by: FAMILY MEDICINE

## 2020-11-27 PROCEDURE — 3079F DIAST BP 80-89 MM HG: CPT | Mod: HCNC,CPTII,S$GLB, | Performed by: FAMILY MEDICINE

## 2020-11-27 PROCEDURE — 3075F SYST BP GE 130 - 139MM HG: CPT | Mod: HCNC,CPTII,S$GLB, | Performed by: FAMILY MEDICINE

## 2020-11-27 PROCEDURE — 99214 OFFICE O/P EST MOD 30 MIN: CPT | Mod: HCNC,S$GLB,, | Performed by: FAMILY MEDICINE

## 2020-11-27 PROCEDURE — 1101F PR PT FALLS ASSESS DOC 0-1 FALLS W/OUT INJ PAST YR: ICD-10-PCS | Mod: HCNC,CPTII,S$GLB, | Performed by: FAMILY MEDICINE

## 2020-11-27 PROCEDURE — 3075F PR MOST RECENT SYSTOLIC BLOOD PRESS GE 130-139MM HG: ICD-10-PCS | Mod: HCNC,CPTII,S$GLB, | Performed by: FAMILY MEDICINE

## 2020-11-27 PROCEDURE — 99999 PR PBB SHADOW E&M-EST. PATIENT-LVL III: CPT | Mod: PBBFAC,HCNC,, | Performed by: FAMILY MEDICINE

## 2020-11-27 PROCEDURE — 1126F PR PAIN SEVERITY QUANTIFIED, NO PAIN PRESENT: ICD-10-PCS | Mod: HCNC,S$GLB,, | Performed by: FAMILY MEDICINE

## 2020-11-27 PROCEDURE — 1101F PT FALLS ASSESS-DOCD LE1/YR: CPT | Mod: HCNC,CPTII,S$GLB, | Performed by: FAMILY MEDICINE

## 2020-11-27 PROCEDURE — 99999 PR PBB SHADOW E&M-EST. PATIENT-LVL III: ICD-10-PCS | Mod: PBBFAC,HCNC,, | Performed by: FAMILY MEDICINE

## 2020-11-27 PROCEDURE — 1159F MED LIST DOCD IN RCRD: CPT | Mod: HCNC,S$GLB,, | Performed by: FAMILY MEDICINE

## 2020-11-27 PROCEDURE — 99214 PR OFFICE/OUTPT VISIT, EST, LEVL IV, 30-39 MIN: ICD-10-PCS | Mod: HCNC,S$GLB,, | Performed by: FAMILY MEDICINE

## 2020-11-27 PROCEDURE — 3079F PR MOST RECENT DIASTOLIC BLOOD PRESSURE 80-89 MM HG: ICD-10-PCS | Mod: HCNC,CPTII,S$GLB, | Performed by: FAMILY MEDICINE

## 2020-11-27 PROCEDURE — 3288F PR FALLS RISK ASSESSMENT DOCUMENTED: ICD-10-PCS | Mod: HCNC,CPTII,S$GLB, | Performed by: FAMILY MEDICINE

## 2020-11-27 PROCEDURE — 1159F PR MEDICATION LIST DOCUMENTED IN MEDICAL RECORD: ICD-10-PCS | Mod: HCNC,S$GLB,, | Performed by: FAMILY MEDICINE

## 2020-11-27 PROCEDURE — 1126F AMNT PAIN NOTED NONE PRSNT: CPT | Mod: HCNC,S$GLB,, | Performed by: FAMILY MEDICINE

## 2020-11-27 NOTE — PROGRESS NOTES
Subjective:   Patient ID: Pravin Ferrari is a 79 y.o. male.  Chief Complaint:  Suture / Staple Removal    Presents for suture removal.    Complex laceration of left upper ear and posterior auricular scalp area.  Healing well    Suture / Staple Removal  The sutures were placed 3 to 6 days ago. He tried antibiotic ointment use, oral antibiotics and regular soap and water washings since the wound repair. The treatment provided significant relief. There has been no drainage from the wound. There is no redness present. There is no swelling present. There is no pain present.       Current Outpatient Medications:     lisinopriL (PRINIVIL,ZESTRIL) 40 MG tablet, Take 1 tablet (40 mg total) by mouth once daily., Disp: 90 tablet, Rfl: 3    multivitamin (THERAGRAN) per tablet, Take 1 tablet by mouth once daily., Disp: , Rfl:     mupirocin (BACTROBAN) 2 % ointment, Apply topically 2 (two) times daily., Disp: 30 g, Rfl: 0    cephALEXin (KEFLEX) 500 MG capsule, , Disp: , Rfl:     Review of Systems   Constitutional: Negative for chills, fatigue and fever.   Respiratory: Negative for cough and shortness of breath.    Cardiovascular: Negative for chest pain and leg swelling.   Gastrointestinal: Negative for abdominal pain, diarrhea, nausea and vomiting.   Genitourinary: Negative for difficulty urinating.   Musculoskeletal: Negative for back pain, myalgias and neck pain.   Skin: Positive for wound. Negative for rash.   Psychiatric/Behavioral: Negative for sleep disturbance. The patient is not nervous/anxious.      Objective:   /88 (BP Location: Left arm, Patient Position: Sitting, BP Method: Medium (Manual))   Pulse 86   Temp 97.8 °F (36.6 °C) (Tympanic)   Wt 71.4 kg (157 lb 6.5 oz)   SpO2 98%   BMI 24.29 kg/m²     Physical Exam  Vitals signs and nursing note reviewed.   Constitutional:       General: He is not in acute distress.     Appearance: Normal appearance. He is well-developed and normal weight.   HENT:       Left Ear: Tympanic membrane and ear canal normal.   Neck:      Musculoskeletal: Full passive range of motion without pain, normal range of motion and neck supple.   Cardiovascular:      Rate and Rhythm: Normal rate and regular rhythm.   Pulmonary:      Effort: Pulmonary effort is normal. No tachypnea, accessory muscle usage or respiratory distress.   Skin:     General: Skin is warm and dry.      Findings: Laceration present. No rash.      Comments:   Left upper ear with healing laceration with multiple simple interrupted nylon sutures in place.    Also with laceration posterior regular with multiple simple interrupted nylon sutures in place.    Once cleansed, area is healing well.    No dehiscence.    No surrounding redness or drainage  No suspicion for secondary bacterial infection.   Neurological:      Mental Status: He is alert.      Coordination: Coordination is intact. Coordination normal.      Gait: Gait is intact. Gait normal.   Psychiatric:         Attention and Perception: Attention normal.         Mood and Affect: Mood normal.       Assessment:       ICD-10-CM ICD-9-CM   1. Laceration of left ear, subsequent encounter  S01.312D V58.89     872.8     Plan:   Laceration of left ear, subsequent encounter    Procedure note   Area cleansed  6 simple interrupted sutures removed from posterior auricular laceration without difficulty.    Wound remained intact without any dehiscence.    Procedure note  External left ear area cleansed  Multiple simple interrupted sutures removed without difficulty.    Also had underlying continual nylon sutures in place which were removed.    Tolerated procedure well without difficulty.    Some minimal bleeding post removal, but no overt dehiscence of wound.    Dermabond was placed over the laceration site.    Removal of sutures took approximately 45 min.    Complete course of oral antibiotics  Okay to discontinue Bactroban  Continue to cleanse daily with soap and water  No  additional evaluation or treatment or needed for this laceration unless any suspicion for infection develops.    Return to clinic as scheduled /as needed.

## 2021-03-03 ENCOUNTER — TELEPHONE (OUTPATIENT)
Dept: ADMINISTRATIVE | Facility: HOSPITAL | Age: 81
End: 2021-03-03

## 2021-04-22 ENCOUNTER — PES CALL (OUTPATIENT)
Dept: ADMINISTRATIVE | Facility: CLINIC | Age: 81
End: 2021-04-22

## 2021-05-05 ENCOUNTER — LAB VISIT (OUTPATIENT)
Dept: LAB | Facility: HOSPITAL | Age: 81
End: 2021-05-05
Attending: FAMILY MEDICINE
Payer: MEDICARE

## 2021-05-05 ENCOUNTER — OFFICE VISIT (OUTPATIENT)
Dept: INTERNAL MEDICINE | Facility: CLINIC | Age: 81
End: 2021-05-05
Payer: MEDICARE

## 2021-05-05 VITALS
HEIGHT: 68 IN | OXYGEN SATURATION: 99 % | BODY MASS INDEX: 23.82 KG/M2 | TEMPERATURE: 98 F | SYSTOLIC BLOOD PRESSURE: 126 MMHG | WEIGHT: 157.19 LBS | DIASTOLIC BLOOD PRESSURE: 84 MMHG | HEART RATE: 73 BPM

## 2021-05-05 DIAGNOSIS — I10 ESSENTIAL HYPERTENSION: Primary | ICD-10-CM

## 2021-05-05 DIAGNOSIS — N18.31 STAGE 3A CHRONIC KIDNEY DISEASE: ICD-10-CM

## 2021-05-05 PROBLEM — N18.9 CKD (CHRONIC KIDNEY DISEASE): Chronic | Status: ACTIVE | Noted: 2020-10-09

## 2021-05-05 PROCEDURE — 3079F DIAST BP 80-89 MM HG: CPT | Mod: HCNC,CPTII,S$GLB, | Performed by: FAMILY MEDICINE

## 2021-05-05 PROCEDURE — 3288F FALL RISK ASSESSMENT DOCD: CPT | Mod: HCNC,CPTII,S$GLB, | Performed by: FAMILY MEDICINE

## 2021-05-05 PROCEDURE — 99999 PR PBB SHADOW E&M-EST. PATIENT-LVL III: ICD-10-PCS | Mod: PBBFAC,HCNC,, | Performed by: FAMILY MEDICINE

## 2021-05-05 PROCEDURE — 99214 PR OFFICE/OUTPT VISIT, EST, LEVL IV, 30-39 MIN: ICD-10-PCS | Mod: HCNC,S$GLB,, | Performed by: FAMILY MEDICINE

## 2021-05-05 PROCEDURE — 36415 COLL VENOUS BLD VENIPUNCTURE: CPT | Mod: HCNC | Performed by: FAMILY MEDICINE

## 2021-05-05 PROCEDURE — 99499 RISK ADDL DX/OHS AUDIT: ICD-10-PCS | Mod: S$GLB,,, | Performed by: FAMILY MEDICINE

## 2021-05-05 PROCEDURE — 3074F PR MOST RECENT SYSTOLIC BLOOD PRESSURE < 130 MM HG: ICD-10-PCS | Mod: HCNC,CPTII,S$GLB, | Performed by: FAMILY MEDICINE

## 2021-05-05 PROCEDURE — 99214 OFFICE O/P EST MOD 30 MIN: CPT | Mod: HCNC,S$GLB,, | Performed by: FAMILY MEDICINE

## 2021-05-05 PROCEDURE — 1159F MED LIST DOCD IN RCRD: CPT | Mod: HCNC,S$GLB,, | Performed by: FAMILY MEDICINE

## 2021-05-05 PROCEDURE — 1101F PR PT FALLS ASSESS DOC 0-1 FALLS W/OUT INJ PAST YR: ICD-10-PCS | Mod: HCNC,CPTII,S$GLB, | Performed by: FAMILY MEDICINE

## 2021-05-05 PROCEDURE — 99999 PR PBB SHADOW E&M-EST. PATIENT-LVL III: CPT | Mod: PBBFAC,HCNC,, | Performed by: FAMILY MEDICINE

## 2021-05-05 PROCEDURE — 3074F SYST BP LT 130 MM HG: CPT | Mod: HCNC,CPTII,S$GLB, | Performed by: FAMILY MEDICINE

## 2021-05-05 PROCEDURE — 3079F PR MOST RECENT DIASTOLIC BLOOD PRESSURE 80-89 MM HG: ICD-10-PCS | Mod: HCNC,CPTII,S$GLB, | Performed by: FAMILY MEDICINE

## 2021-05-05 PROCEDURE — 1159F PR MEDICATION LIST DOCUMENTED IN MEDICAL RECORD: ICD-10-PCS | Mod: HCNC,S$GLB,, | Performed by: FAMILY MEDICINE

## 2021-05-05 PROCEDURE — 3288F PR FALLS RISK ASSESSMENT DOCUMENTED: ICD-10-PCS | Mod: HCNC,CPTII,S$GLB, | Performed by: FAMILY MEDICINE

## 2021-05-05 PROCEDURE — 1126F AMNT PAIN NOTED NONE PRSNT: CPT | Mod: HCNC,S$GLB,, | Performed by: FAMILY MEDICINE

## 2021-05-05 PROCEDURE — 99499 UNLISTED E&M SERVICE: CPT | Mod: S$GLB,,, | Performed by: FAMILY MEDICINE

## 2021-05-05 PROCEDURE — 1101F PT FALLS ASSESS-DOCD LE1/YR: CPT | Mod: HCNC,CPTII,S$GLB, | Performed by: FAMILY MEDICINE

## 2021-05-05 PROCEDURE — 1126F PR PAIN SEVERITY QUANTIFIED, NO PAIN PRESENT: ICD-10-PCS | Mod: HCNC,S$GLB,, | Performed by: FAMILY MEDICINE

## 2021-05-05 PROCEDURE — 80069 RENAL FUNCTION PANEL: CPT | Mod: HCNC | Performed by: FAMILY MEDICINE

## 2021-05-06 LAB
ALBUMIN SERPL BCP-MCNC: 3.4 G/DL (ref 3.5–5.2)
ANION GAP SERPL CALC-SCNC: 5 MMOL/L (ref 8–16)
BUN SERPL-MCNC: 16 MG/DL (ref 8–23)
CALCIUM SERPL-MCNC: 8.7 MG/DL (ref 8.7–10.5)
CHLORIDE SERPL-SCNC: 108 MMOL/L (ref 95–110)
CO2 SERPL-SCNC: 31 MMOL/L (ref 23–29)
CREAT SERPL-MCNC: 1 MG/DL (ref 0.5–1.4)
EST. GFR  (AFRICAN AMERICAN): >60 ML/MIN/1.73 M^2
EST. GFR  (NON AFRICAN AMERICAN): >60 ML/MIN/1.73 M^2
GLUCOSE SERPL-MCNC: 67 MG/DL (ref 70–110)
PHOSPHATE SERPL-MCNC: 3.4 MG/DL (ref 2.7–4.5)
POTASSIUM SERPL-SCNC: 4.2 MMOL/L (ref 3.5–5.1)
SODIUM SERPL-SCNC: 144 MMOL/L (ref 136–145)

## 2021-05-07 PROBLEM — N18.31 STAGE 3A CHRONIC KIDNEY DISEASE: Status: RESOLVED | Noted: 2020-10-09 | Resolved: 2021-05-07

## 2021-12-06 ENCOUNTER — LAB VISIT (OUTPATIENT)
Dept: LAB | Facility: HOSPITAL | Age: 81
End: 2021-12-06
Attending: FAMILY MEDICINE
Payer: MEDICARE

## 2021-12-06 ENCOUNTER — OFFICE VISIT (OUTPATIENT)
Dept: INTERNAL MEDICINE | Facility: CLINIC | Age: 81
End: 2021-12-06
Payer: MEDICARE

## 2021-12-06 VITALS
HEART RATE: 72 BPM | BODY MASS INDEX: 23.65 KG/M2 | DIASTOLIC BLOOD PRESSURE: 72 MMHG | HEIGHT: 68 IN | WEIGHT: 156.06 LBS | SYSTOLIC BLOOD PRESSURE: 138 MMHG | TEMPERATURE: 98 F | OXYGEN SATURATION: 99 %

## 2021-12-06 DIAGNOSIS — Z13.6 ENCOUNTER FOR SCREENING FOR CARDIOVASCULAR DISORDERS: ICD-10-CM

## 2021-12-06 DIAGNOSIS — I10 ESSENTIAL HYPERTENSION: ICD-10-CM

## 2021-12-06 DIAGNOSIS — Z23 NEED FOR INFLUENZA VACCINATION: ICD-10-CM

## 2021-12-06 DIAGNOSIS — Z85.46 HISTORY OF PROSTATE CANCER: ICD-10-CM

## 2021-12-06 DIAGNOSIS — Z00.00 ANNUAL PHYSICAL EXAM: ICD-10-CM

## 2021-12-06 DIAGNOSIS — Z00.00 ANNUAL PHYSICAL EXAM: Primary | ICD-10-CM

## 2021-12-06 DIAGNOSIS — R41.3 MEMORY DIFFICULTIES: ICD-10-CM

## 2021-12-06 LAB
ERYTHROCYTE [DISTWIDTH] IN BLOOD BY AUTOMATED COUNT: 12.6 % (ref 11.5–14.5)
HCT VFR BLD AUTO: 42.7 % (ref 40–54)
HGB BLD-MCNC: 14.1 G/DL (ref 14–18)
MCH RBC QN AUTO: 30.7 PG (ref 27–31)
MCHC RBC AUTO-ENTMCNC: 33 G/DL (ref 32–36)
MCV RBC AUTO: 93 FL (ref 82–98)
PLATELET # BLD AUTO: 251 K/UL (ref 150–450)
PMV BLD AUTO: 11.1 FL (ref 9.2–12.9)
RBC # BLD AUTO: 4.6 M/UL (ref 4.6–6.2)
WBC # BLD AUTO: 6.42 K/UL (ref 3.9–12.7)

## 2021-12-06 PROCEDURE — 90694 VACC AIIV4 NO PRSRV 0.5ML IM: CPT | Mod: HCNC,S$GLB,, | Performed by: FAMILY MEDICINE

## 2021-12-06 PROCEDURE — 84425 ASSAY OF VITAMIN B-1: CPT | Mod: HCNC | Performed by: FAMILY MEDICINE

## 2021-12-06 PROCEDURE — G0008 FLU VACCINE - QUADRIVALENT - ADJUVANTED: ICD-10-PCS | Mod: HCNC,S$GLB,, | Performed by: FAMILY MEDICINE

## 2021-12-06 PROCEDURE — 99397 PR PREVENTIVE VISIT,EST,65 & OVER: ICD-10-PCS | Mod: 25,HCNC,S$GLB, | Performed by: FAMILY MEDICINE

## 2021-12-06 PROCEDURE — 90694 FLU VACCINE - QUADRIVALENT - ADJUVANTED: ICD-10-PCS | Mod: HCNC,S$GLB,, | Performed by: FAMILY MEDICINE

## 2021-12-06 PROCEDURE — 99999 PR PBB SHADOW E&M-EST. PATIENT-LVL III: ICD-10-PCS | Mod: PBBFAC,HCNC,, | Performed by: FAMILY MEDICINE

## 2021-12-06 PROCEDURE — 86038 ANTINUCLEAR ANTIBODIES: CPT | Mod: HCNC | Performed by: FAMILY MEDICINE

## 2021-12-06 PROCEDURE — 36415 COLL VENOUS BLD VENIPUNCTURE: CPT | Mod: HCNC | Performed by: FAMILY MEDICINE

## 2021-12-06 PROCEDURE — 85027 COMPLETE CBC AUTOMATED: CPT | Mod: HCNC | Performed by: FAMILY MEDICINE

## 2021-12-06 PROCEDURE — 80053 COMPREHEN METABOLIC PANEL: CPT | Mod: HCNC | Performed by: FAMILY MEDICINE

## 2021-12-06 PROCEDURE — 80061 LIPID PANEL: CPT | Mod: HCNC | Performed by: FAMILY MEDICINE

## 2021-12-06 PROCEDURE — 99499 UNLISTED E&M SERVICE: CPT | Mod: HCNC,S$GLB,, | Performed by: FAMILY MEDICINE

## 2021-12-06 PROCEDURE — G0008 ADMIN INFLUENZA VIRUS VAC: HCPCS | Mod: HCNC,S$GLB,, | Performed by: FAMILY MEDICINE

## 2021-12-06 PROCEDURE — 86592 SYPHILIS TEST NON-TREP QUAL: CPT | Mod: HCNC | Performed by: FAMILY MEDICINE

## 2021-12-06 PROCEDURE — 99499 RISK ADDL DX/OHS AUDIT: ICD-10-PCS | Mod: HCNC,S$GLB,, | Performed by: FAMILY MEDICINE

## 2021-12-06 PROCEDURE — 82746 ASSAY OF FOLIC ACID SERUM: CPT | Mod: HCNC | Performed by: FAMILY MEDICINE

## 2021-12-06 PROCEDURE — 99999 PR PBB SHADOW E&M-EST. PATIENT-LVL III: CPT | Mod: PBBFAC,HCNC,, | Performed by: FAMILY MEDICINE

## 2021-12-06 PROCEDURE — 85652 RBC SED RATE AUTOMATED: CPT | Mod: HCNC | Performed by: FAMILY MEDICINE

## 2021-12-06 PROCEDURE — 86039 ANTINUCLEAR ANTIBODIES (ANA): CPT | Mod: HCNC | Performed by: FAMILY MEDICINE

## 2021-12-06 PROCEDURE — 84443 ASSAY THYROID STIM HORMONE: CPT | Mod: HCNC | Performed by: FAMILY MEDICINE

## 2021-12-06 PROCEDURE — 86140 C-REACTIVE PROTEIN: CPT | Mod: HCNC | Performed by: FAMILY MEDICINE

## 2021-12-06 PROCEDURE — 82607 VITAMIN B-12: CPT | Mod: HCNC | Performed by: FAMILY MEDICINE

## 2021-12-06 PROCEDURE — 86235 NUCLEAR ANTIGEN ANTIBODY: CPT | Mod: 59,HCNC | Performed by: FAMILY MEDICINE

## 2021-12-06 PROCEDURE — 99397 PER PM REEVAL EST PAT 65+ YR: CPT | Mod: 25,HCNC,S$GLB, | Performed by: FAMILY MEDICINE

## 2021-12-07 LAB
ALBUMIN SERPL BCP-MCNC: 3.7 G/DL (ref 3.5–5.2)
ALP SERPL-CCNC: 61 U/L (ref 55–135)
ALT SERPL W/O P-5'-P-CCNC: 15 U/L (ref 10–44)
ANION GAP SERPL CALC-SCNC: 9 MMOL/L (ref 8–16)
AST SERPL-CCNC: 30 U/L (ref 10–40)
BILIRUB SERPL-MCNC: 0.3 MG/DL (ref 0.1–1)
BUN SERPL-MCNC: 22 MG/DL (ref 8–23)
CALCIUM SERPL-MCNC: 9.3 MG/DL (ref 8.7–10.5)
CHLORIDE SERPL-SCNC: 104 MMOL/L (ref 95–110)
CHOLEST SERPL-MCNC: 165 MG/DL (ref 120–199)
CHOLEST/HDLC SERPL: 3.1 {RATIO} (ref 2–5)
CO2 SERPL-SCNC: 27 MMOL/L (ref 23–29)
CREAT SERPL-MCNC: 1.2 MG/DL (ref 0.5–1.4)
CRP SERPL-MCNC: 1.2 MG/L (ref 0–8.2)
ERYTHROCYTE [SEDIMENTATION RATE] IN BLOOD BY WESTERGREN METHOD: 5 MM/HR (ref 0–23)
EST. GFR  (AFRICAN AMERICAN): >60 ML/MIN/1.73 M^2
EST. GFR  (NON AFRICAN AMERICAN): 57.2 ML/MIN/1.73 M^2
FOLATE SERPL-MCNC: 18.2 NG/ML (ref 4–24)
GLUCOSE SERPL-MCNC: 87 MG/DL (ref 70–110)
HDLC SERPL-MCNC: 54 MG/DL (ref 40–75)
HDLC SERPL: 32.7 % (ref 20–50)
LDLC SERPL CALC-MCNC: 93.2 MG/DL (ref 63–159)
NONHDLC SERPL-MCNC: 111 MG/DL
POTASSIUM SERPL-SCNC: 4.5 MMOL/L (ref 3.5–5.1)
PROT SERPL-MCNC: 6.9 G/DL (ref 6–8.4)
SODIUM SERPL-SCNC: 140 MMOL/L (ref 136–145)
TRIGL SERPL-MCNC: 89 MG/DL (ref 30–150)
TSH SERPL DL<=0.005 MIU/L-ACNC: 1.24 UIU/ML (ref 0.4–4)
VIT B12 SERPL-MCNC: 360 PG/ML (ref 210–950)

## 2021-12-08 LAB
ANA PATTERN 1: NORMAL
ANA SER QL IF: POSITIVE
ANA TITR SER IF: NORMAL {TITER}
RPR SER QL: NORMAL

## 2021-12-09 LAB — VIT B1 BLD-MCNC: 77 UG/L (ref 38–122)

## 2021-12-10 LAB
ANTI SM ANTIBODY: 0.08 RATIO (ref 0–0.99)
ANTI SM/RNP ANTIBODY: 0.11 RATIO (ref 0–0.99)
ANTI-SM INTERPRETATION: NEGATIVE
ANTI-SM/RNP INTERPRETATION: NEGATIVE
ANTI-SSA ANTIBODY: 0.12 RATIO (ref 0–0.99)
ANTI-SSA INTERPRETATION: NEGATIVE
ANTI-SSB ANTIBODY: 0.11 RATIO (ref 0–0.99)
ANTI-SSB INTERPRETATION: NEGATIVE
DSDNA AB SER-ACNC: NORMAL [IU]/ML

## 2021-12-14 ENCOUNTER — TELEPHONE (OUTPATIENT)
Dept: FAMILY MEDICINE | Facility: CLINIC | Age: 81
End: 2021-12-14
Payer: MEDICARE

## 2021-12-30 ENCOUNTER — IMMUNIZATION (OUTPATIENT)
Dept: PRIMARY CARE CLINIC | Facility: CLINIC | Age: 81
End: 2021-12-30
Payer: MEDICARE

## 2021-12-30 DIAGNOSIS — Z23 NEED FOR VACCINATION: Primary | ICD-10-CM

## 2021-12-30 PROCEDURE — 0064A COVID-19, MRNA, LNP-S, PF, 100 MCG/0.25 ML DOSE VACCINE (MODERNA BOOSTER): CPT | Mod: CV19,PBBFAC | Performed by: FAMILY MEDICINE

## 2022-01-12 ENCOUNTER — HOSPITAL ENCOUNTER (EMERGENCY)
Facility: HOSPITAL | Age: 82
Discharge: HOME | End: 2022-01-12
Attending: EMERGENCY MEDICINE
Payer: MEDICARE

## 2022-01-12 ENCOUNTER — NURSE TRIAGE (OUTPATIENT)
Dept: ADMINISTRATIVE | Facility: CLINIC | Age: 82
End: 2022-01-12
Payer: MEDICARE

## 2022-01-12 VITALS
DIASTOLIC BLOOD PRESSURE: 73 MMHG | TEMPERATURE: 98 F | OXYGEN SATURATION: 98 % | WEIGHT: 159.5 LBS | RESPIRATION RATE: 18 BRPM | HEART RATE: 77 BPM | BODY MASS INDEX: 24.61 KG/M2 | SYSTOLIC BLOOD PRESSURE: 146 MMHG

## 2022-01-12 DIAGNOSIS — T78.3XXA ANGIOEDEMA, INITIAL ENCOUNTER: Primary | ICD-10-CM

## 2022-01-12 DIAGNOSIS — R47.81 SLURRED SPEECH: ICD-10-CM

## 2022-01-12 DIAGNOSIS — R22.0 LIP SWELLING: ICD-10-CM

## 2022-01-12 LAB
ALBUMIN SERPL BCP-MCNC: 3.5 G/DL (ref 3.5–5.2)
ALP SERPL-CCNC: 52 U/L (ref 55–135)
ALT SERPL W/O P-5'-P-CCNC: 12 U/L (ref 10–44)
ANION GAP SERPL CALC-SCNC: 6 MMOL/L (ref 8–16)
AST SERPL-CCNC: 19 U/L (ref 10–40)
BASOPHILS # BLD AUTO: 0.05 K/UL (ref 0–0.2)
BASOPHILS NFR BLD: 0.8 % (ref 0–1.9)
BILIRUB SERPL-MCNC: 0.6 MG/DL (ref 0.1–1)
BUN SERPL-MCNC: 19 MG/DL (ref 8–23)
CALCIUM SERPL-MCNC: 8.8 MG/DL (ref 8.7–10.5)
CHLORIDE SERPL-SCNC: 106 MMOL/L (ref 95–110)
CO2 SERPL-SCNC: 29 MMOL/L (ref 23–29)
CREAT SERPL-MCNC: 1.1 MG/DL (ref 0.5–1.4)
DIFFERENTIAL METHOD: ABNORMAL
EOSINOPHIL # BLD AUTO: 0.5 K/UL (ref 0–0.5)
EOSINOPHIL NFR BLD: 7.9 % (ref 0–8)
ERYTHROCYTE [DISTWIDTH] IN BLOOD BY AUTOMATED COUNT: 12.6 % (ref 11.5–14.5)
EST. GFR  (AFRICAN AMERICAN): >60 ML/MIN/1.73 M^2
EST. GFR  (NON AFRICAN AMERICAN): >60 ML/MIN/1.73 M^2
GLUCOSE SERPL-MCNC: 80 MG/DL (ref 70–110)
HCT VFR BLD AUTO: 42.5 % (ref 40–54)
HGB BLD-MCNC: 13.9 G/DL (ref 14–18)
IMM GRANULOCYTES # BLD AUTO: 0.03 K/UL (ref 0–0.04)
IMM GRANULOCYTES NFR BLD AUTO: 0.5 % (ref 0–0.5)
LYMPHOCYTES # BLD AUTO: 1.4 K/UL (ref 1–4.8)
LYMPHOCYTES NFR BLD: 22.3 % (ref 18–48)
MCH RBC QN AUTO: 30 PG (ref 27–31)
MCHC RBC AUTO-ENTMCNC: 32.7 G/DL (ref 32–36)
MCV RBC AUTO: 92 FL (ref 82–98)
MONOCYTES # BLD AUTO: 1 K/UL (ref 0.3–1)
MONOCYTES NFR BLD: 15.3 % (ref 4–15)
NEUTROPHILS # BLD AUTO: 3.3 K/UL (ref 1.8–7.7)
NEUTROPHILS NFR BLD: 53.2 % (ref 38–73)
NRBC BLD-RTO: 0 /100 WBC
PLATELET # BLD AUTO: 266 K/UL (ref 150–450)
PMV BLD AUTO: 10.3 FL (ref 9.2–12.9)
POTASSIUM SERPL-SCNC: 4.5 MMOL/L (ref 3.5–5.1)
PROT SERPL-MCNC: 6.3 G/DL (ref 6–8.4)
RBC # BLD AUTO: 4.63 M/UL (ref 4.6–6.2)
SODIUM SERPL-SCNC: 141 MMOL/L (ref 136–145)
WBC # BLD AUTO: 6.2 K/UL (ref 3.9–12.7)

## 2022-01-12 PROCEDURE — 96375 TX/PRO/DX INJ NEW DRUG ADDON: CPT | Mod: HCNC

## 2022-01-12 PROCEDURE — 96374 THER/PROPH/DIAG INJ IV PUSH: CPT | Mod: HCNC

## 2022-01-12 PROCEDURE — 85025 COMPLETE CBC W/AUTO DIFF WBC: CPT | Mod: HCNC | Performed by: EMERGENCY MEDICINE

## 2022-01-12 PROCEDURE — 99284 EMERGENCY DEPT VISIT MOD MDM: CPT | Mod: 25,HCNC

## 2022-01-12 PROCEDURE — 80053 COMPREHEN METABOLIC PANEL: CPT | Mod: HCNC | Performed by: EMERGENCY MEDICINE

## 2022-01-12 PROCEDURE — 63600175 PHARM REV CODE 636 W HCPCS: Mod: HCNC | Performed by: EMERGENCY MEDICINE

## 2022-01-12 RX ORDER — HYDROCHLOROTHIAZIDE 25 MG/1
25 TABLET ORAL DAILY
Qty: 30 TABLET | Refills: 0 | Status: SHIPPED | OUTPATIENT
Start: 2022-01-12 | End: 2022-01-12 | Stop reason: SDUPTHER

## 2022-01-12 RX ORDER — HYDROCHLOROTHIAZIDE 25 MG/1
25 TABLET ORAL DAILY
Qty: 30 TABLET | Refills: 0 | Status: SHIPPED | OUTPATIENT
Start: 2022-01-12 | End: 2022-01-12 | Stop reason: ALTCHOICE

## 2022-01-12 RX ORDER — METHYLPREDNISOLONE SOD SUCC 125 MG
125 VIAL (EA) INJECTION
Status: COMPLETED | OUTPATIENT
Start: 2022-01-12 | End: 2022-01-12

## 2022-01-12 RX ORDER — AMLODIPINE BESYLATE 5 MG/1
5 TABLET ORAL DAILY
Qty: 30 TABLET | Refills: 0 | Status: SHIPPED | OUTPATIENT
Start: 2022-01-12 | End: 2022-01-13

## 2022-01-12 RX ORDER — DIPHENHYDRAMINE HYDROCHLORIDE 50 MG/ML
25 INJECTION INTRAMUSCULAR; INTRAVENOUS
Status: COMPLETED | OUTPATIENT
Start: 2022-01-12 | End: 2022-01-12

## 2022-01-12 RX ADMIN — DIPHENHYDRAMINE HYDROCHLORIDE 25 MG: 50 INJECTION INTRAMUSCULAR; INTRAVENOUS at 08:01

## 2022-01-12 RX ADMIN — METHYLPREDNISOLONE SODIUM SUCCINATE 125 MG: 125 INJECTION, POWDER, FOR SOLUTION INTRAMUSCULAR; INTRAVENOUS at 08:01

## 2022-01-12 NOTE — TELEPHONE ENCOUNTER
Upper lip and rt cheek is swollen. Pt is on lisinopril. He took lisinopril about an hour ago. Care advice recommend pt go to Er. Cg verbalized understanding.     Reason for Disposition   Taking an ACE Inhibitor medication  (e.g., benazepril/LOTENSIN, captopril/CAPOTEN, enalapril/VASOTEC, lisinopril/ZESTRIL)    Additional Information   Negative: Unresponsive, passed out or very weak   Negative: Swollen tongue   Negative: Difficulty breathing or wheezing   Negative: [1] Life-threatening reaction in the past to similar substance (e.g., food, insect bite/sting, chemical, etc.) AND [2] < 2 hours since exposure   Negative: Sounds like a life-threatening emergency to the triager    Protocols used: LIP SWELLING-A-AH

## 2022-01-12 NOTE — TELEPHONE ENCOUNTER
Contacted by the ER.  They treated him.  Recommended discontinuation of lisinopril.  Plan to start him on amlodipine 5 mg daily.  He is scheduled to see Dr. Ortega tomorrow at 3:00 p.m., please just double book and add on to my schedule tomorrow so he can see me

## 2022-01-12 NOTE — ED PROVIDER NOTES
"SCRIBE #1 NOTE: I, Lexx Li, am scribing for, and in the presence of, Matthew Hunter MD. I have scribed the entire note.      History      Chief Complaint   Patient presents with    Facial Swelling     On lisinopril, left facial swelling noted        Review of patient's allergies indicates:  No Known Allergies     HPI   HPI    1/12/2022, 7:33 AM   History obtained from the patient      History of Present Illness: Pravin Ferrari is a 81 y.o. male patient who presents to the Emergency Department for R-sided facial swelling, onset this morning. Symptoms are constant and moderate in severity. No mitigating or exacerbating factors reported. No associated sxs reported. Patient denies any fever, chills, n/v/d, SOB, difficulty swallowing, CP, weakness, numbness, dizziness, headache, and all other sxs at this time. Pt notes that he has been on Lisinopril for the past 6-7 years. No further complaints or concerns at this time.     Arrival mode: Personal vehicle    PCP: José Miguel Harrington MD       Past Medical History:  Past Medical History:   Diagnosis Date    Cancer     prostate    [place] (hard of hearing)     bilateral ears    Hypertension     pt states no       Past Surgical History:  Past Surgical History:   Procedure Laterality Date    COLONOSCOPY N/A 3/19/2019    Procedure: COLONOSCOPY;  Surgeon: Korin Cavazos MD;  Location: [facility];  Service: Endoscopy;  Laterality: N/A;    EYE SURGERY      HERNIA REPAIR      PROSTATE SURGERY           Family History:  Family History   Problem Relation Age of Onset    No Known Problems Mother     Aneurysm Father     Cerebral aneurysm Father     Prostate cancer Brother     Prostate cancer Son        Social History:  Social History     Tobacco Use    Smoking status: Never Smoker    Smokeless tobacco: Never Used   Substance and Sexual Activity    Alcohol use: Yes     Comment: drinks one beer every "once in a while during summer"    Drug use: No    Sexual activity: Not on " file       ROS   Review of Systems   Constitutional: Negative for chills and fever.   HENT: Positive for facial swelling (R). Negative for sore throat and trouble swallowing.    Respiratory: Negative for shortness of breath.    Cardiovascular: Negative for chest pain.   Gastrointestinal: Negative for diarrhea, nausea and vomiting.   Genitourinary: Negative for dysuria.   Musculoskeletal: Negative for back pain.   Skin: Negative for rash.   Neurological: Negative for dizziness, weakness, light-headedness, numbness and headaches.   Hematological: Does not bruise/bleed easily.   All other systems reviewed and are negative.    Physical Exam      Initial Vitals   BP Pulse Resp Temp SpO2   01/12/22 0718 01/12/22 0717 01/12/22 0717 01/12/22 0717 01/12/22 0717   138/73 77 18 97.7 °F (36.5 °C) 95 %      MAP       --                 Physical Exam  Nursing Notes and Vital Signs Reviewed.  Constitutional: Patient is in no acute distress. Well-developed and well-nourished.  Head: Upper lip swelling, R worse than L radiating into R cheek.  Eyes: PERRL. EOM intact. Conjunctivae are not pale. No scleral icterus.  ENT: Mucous membranes are moist. Oropharynx is clear and symmetric. No uvular swelling. Airway patent. Pt is handling secretions normally.  Neck: Supple. Full ROM. No lymphadenopathy.  Cardiovascular: Regular rate. Regular rhythm. No murmurs, rubs, or gallops. Distal pulses are 2+ and symmetric.  Pulmonary/Chest: No respiratory distress. Clear to auscultation bilaterally. No wheezing or rales.  Abdominal: Soft and non-distended.  There is no tenderness.  No rebound, guarding, or rigidity.   Musculoskeletal: Moves all extremities. No obvious deformities. No edema.  Skin: Warm and dry.  Neurological:  Alert, awake, and appropriate.  Normal speech.  No acute focal neurological deficits are appreciated.  Psychiatric: Normal affect. Good eye contact. Appropriate in content.    ED Course    Procedures  ED Vital Signs:  Vitals:     01/12/22 0717 01/12/22 0718 01/12/22 0900 01/12/22 1015   BP:  138/73 (!) 165/77 (!) 157/73   Pulse: 77  62 69   Resp: 18  18 18   Temp: 97.7 °F (36.5 °C)      TempSrc: Oral      SpO2: 95%  97% 97%   Weight: 72.3 kg (159 lb 8 oz)       01/12/22 1130 01/12/22 1145   BP: (!) 141/67 (!) 146/73   Pulse: 72 77   Resp: 18 18   Temp:  97.8 °F (36.6 °C)   TempSrc:  Oral   SpO2: 95% 98%   Weight:         Abnormal Lab Results:  Labs Reviewed   CBC W/ AUTO DIFFERENTIAL - Abnormal; Notable for the following components:       Result Value    Hemoglobin 13.9 (*)     Mono % 15.3 (*)     All other components within normal limits   COMPREHENSIVE METABOLIC PANEL - Abnormal; Notable for the following components:    Alkaline Phosphatase 52 (*)     Anion Gap 6 (*)     All other components within normal limits        All Lab Results:  Results for orders placed or performed during the hospital encounter of 01/12/22   CBC auto differential   Result Value Ref Range    WBC 6.20 3.90 - 12.70 K/uL    RBC 4.63 4.60 - 6.20 M/uL    Hemoglobin 13.9 (L) 14.0 - 18.0 g/dL    Hematocrit 42.5 40.0 - 54.0 %    MCV 92 82 - 98 fL    MCH 30.0 27.0 - 31.0 pg    MCHC 32.7 32.0 - 36.0 g/dL    RDW 12.6 11.5 - 14.5 %    Platelets 266 150 - 450 K/uL    MPV 10.3 9.2 - 12.9 fL    Immature Granulocytes 0.5 0.0 - 0.5 %    Gran # (ANC) 3.3 1.8 - 7.7 K/uL    Immature Grans (Abs) 0.03 0.00 - 0.04 K/uL    Lymph # 1.4 1.0 - 4.8 K/uL    Mono # 1.0 0.3 - 1.0 K/uL    Eos # 0.5 0.0 - 0.5 K/uL    Baso # 0.05 0.00 - 0.20 K/uL    nRBC 0 0 /100 WBC    Gran % 53.2 38.0 - 73.0 %    Lymph % 22.3 18.0 - 48.0 %    Mono % 15.3 (H) 4.0 - 15.0 %    Eosinophil % 7.9 0.0 - 8.0 %    Basophil % 0.8 0.0 - 1.9 %    Differential Method Automated    Comprehensive metabolic panel   Result Value Ref Range    Sodium 141 136 - 145 mmol/L    Potassium 4.5 3.5 - 5.1 mmol/L    Chloride 106 95 - 110 mmol/L    CO2 29 23 - 29 mmol/L    Glucose 80 70 - 110 mg/dL    BUN 19 8 - 23 mg/dL    Creatinine 1.1  0.5 - 1.4 mg/dL    Calcium 8.8 8.7 - 10.5 mg/dL    Total Protein 6.3 6.0 - 8.4 g/dL    Albumin 3.5 3.5 - 5.2 g/dL    Total Bilirubin 0.6 0.1 - 1.0 mg/dL    Alkaline Phosphatase 52 (L) 55 - 135 U/L    AST 19 10 - 40 U/L    ALT 12 10 - 44 U/L    Anion Gap 6 (L) 8 - 16 mmol/L    eGFR if African American >60 >60 mL/min/1.73 m^2    eGFR if non African American >60 >60 mL/min/1.73 m^2     Imaging Results:  Imaging Results          CT Head Without Contrast (Final result)  Result time 01/12/22 11:02:50    Final result by Mart Daly MD (01/12/22 11:02:50)                 Impression:      Chronic microvascular ischemic changes. If there is additional clinical concern for acute infarct, MRI with diffusion weighted imaging recommended.      Electronically signed by: Mart Daly MD  Date:    01/12/2022  Time:    11:02             Narrative:    EXAMINATION:  CT HEAD WITHOUT CONTRAST    CLINICAL HISTORY:  Slurred speech;    TECHNIQUE:  Low dose axial CT images obtained throughout the head without intravenous contrast. Sagittal and coronal reconstructions were performed.  All CT scans at this facility use dose modulation, iterative reconstruction and/or weight based dosing when appropriate to reduce radiation dose to as low as reasonably achievable.    COMPARISON:  None.    FINDINGS:  Intracranial compartment:    The brain parenchyma demonstrates areas of decreased attenuation with moderate periventricular white matter consistent with chronic microvascular ischemic changes..  No parenchymal mass, hemorrhage, edema or major vascular distribution infarct.  Vascular calcifications are noted.    Moderate prominence of the sulci and ventricles are consistent with age-related involutional changes.    No extra-axial blood or fluid collections.    Skull/extracranial contents (limited evaluation): No fracture. Mastoid air cells and paranasal sinuses are essentially clear.                               X-Ray Chest AP Portable (Final  result)  Result time 01/12/22 08:14:22    Final result by Mart Dlay MD (01/12/22 08:14:22)                 Impression:      No acute process seen.  See findings and recommendations above.      Electronically signed by: Mrat Daly MD  Date:    01/12/2022  Time:    08:14             Narrative:    EXAMINATION:  XR CHEST AP PORTABLE    CLINICAL HISTORY:  Chest Pain;    FINDINGS:  Single view of the chest.  No comparison.    Cardiac silhouette is normal.  Aorta demonstrates atherosclerotic disease. The lungs demonstrate no evidence of active disease.  Background of mild chronic interstitial lung disease is suspected.  No evidence of pleural effusion or pneumothorax.  Bones appear intact with mild degenerative changes.    Suspect mild thyroid goiter.  Recommend follow-up thyroid ultrasound.                                        The Emergency Provider reviewed the vital signs and test results, which are outlined above.    ED Discussion     10:22 AM: Re-evaluated pt. Pt is resting comfortably and is in no acute distress. I believe that the pt's facial swelling has improved, although the pt's wife states that the pt's swelling might have  Worsened slightly (to lower lip); wife states that the pt's swelling has extended to his lower lip, and that he is now having slurred speech. D/w pt all pertinent results. D/w pt any concerns expressed at this time. Answered all questions. Pt expresses understanding at this time. Will order head CT.    11:38 AM: Reassessed pt at this time. Pt's facial swelling and slurred speech have resolved at this time. Discussed with pt all pertinent ED information and results. Discussed pt dx and plan of tx. Gave pt all f/u and return to the ED instructions. All questions and concerns were addressed at this time. Pt expresses understanding of information and instructions, and is comfortable with plan to discharge. Pt is stable for discharge.    I discussed with patient and/or  family/caretaker that evaluation in the ED does not suggest any emergent or life threatening medical conditions requiring immediate intervention beyond what was provided in the ED, and I believe patient is safe for discharge.  Regardless, an unremarkable evaluation in the ED does not preclude the development or presence of a serious of life threatening condition. As such, patient was instructed to return immediately for any worsening or change in current symptoms.    11:54 AM: Discussed pt's case with pt's PCP, Dr. Harrington (Family Medicine) who recommends starting the pt on a beta blocker or amlodipine.         ED Medication(s):  Medications   diphenhydrAMINE injection 25 mg (25 mg Intravenous Given 1/12/22 0812)   methylPREDNISolone sodium succinate injection 125 mg (125 mg Intravenous Given 1/12/22 0812)     Discharge Medication List as of 1/12/2022 11:56 AM      START taking these medications    Details   amLODIPine (NORVASC) 5 MG tablet Take 1 tablet (5 mg total) by mouth once daily., Starting Wed 1/12/2022, Until Thu 1/12/2023, Normal            Follow-up Information     José Miguel Harrington MD In 2 days.    Specialty: Family Medicine  Contact information:  [id number]  [place] [address]  [phone]                           Medical Decision Making    Medical Decision Making:   Clinical Tests:   Lab Tests: Ordered and Reviewed  Radiological Study: Ordered and Reviewed           Scribe Attestation:   Scribe #1: I performed the above scribed service and the documentation accurately describes the services I performed. I attest to the accuracy of the note.    Attending:   Physician Attestation Statement for Scribe #1: I, Matthew Hunter MD, personally performed the services described in this documentation, as scribed by Lexx Li, in my presence, and it is both accurate and complete.          Clinical Impression       ICD-10-CM ICD-9-CM   1. Angioedema, initial encounter  T78.3XXA 995.1   2. Lip swelling   R22.0 784.2   3. Slurred speech  R47.81 784.59       Disposition:   Disposition: Discharged  Condition: Stable         Matthew Hunter MD  01/13/22 0905

## 2022-01-13 ENCOUNTER — OFFICE VISIT (OUTPATIENT)
Dept: INTERNAL MEDICINE | Facility: CLINIC | Age: 82
End: 2022-01-13
Payer: MEDICARE

## 2022-01-13 VITALS
BODY MASS INDEX: 24.09 KG/M2 | OXYGEN SATURATION: 95 % | HEIGHT: 68 IN | HEART RATE: 74 BPM | DIASTOLIC BLOOD PRESSURE: 86 MMHG | TEMPERATURE: 99 F | SYSTOLIC BLOOD PRESSURE: 138 MMHG | WEIGHT: 158.94 LBS

## 2022-01-13 DIAGNOSIS — I10 ESSENTIAL HYPERTENSION: ICD-10-CM

## 2022-01-13 DIAGNOSIS — T78.3XXA ANGIOEDEMA DUE TO ANGIOTENSIN CONVERTING ENZYME INHIBITOR (ACE-I): Primary | ICD-10-CM

## 2022-01-13 DIAGNOSIS — T46.4X5A ANGIOEDEMA DUE TO ANGIOTENSIN CONVERTING ENZYME INHIBITOR (ACE-I): Primary | ICD-10-CM

## 2022-01-13 PROCEDURE — 3288F PR FALLS RISK ASSESSMENT DOCUMENTED: ICD-10-PCS | Mod: HCNC,CPTII,S$GLB, | Performed by: FAMILY MEDICINE

## 2022-01-13 PROCEDURE — 1101F PR PT FALLS ASSESS DOC 0-1 FALLS W/OUT INJ PAST YR: ICD-10-PCS | Mod: HCNC,CPTII,S$GLB, | Performed by: FAMILY MEDICINE

## 2022-01-13 PROCEDURE — 99999 PR PBB SHADOW E&M-EST. PATIENT-LVL III: CPT | Mod: PBBFAC,HCNC,, | Performed by: FAMILY MEDICINE

## 2022-01-13 PROCEDURE — 1159F MED LIST DOCD IN RCRD: CPT | Mod: HCNC,CPTII,S$GLB, | Performed by: FAMILY MEDICINE

## 2022-01-13 PROCEDURE — 99214 OFFICE O/P EST MOD 30 MIN: CPT | Mod: HCNC,S$GLB,, | Performed by: FAMILY MEDICINE

## 2022-01-13 PROCEDURE — 1159F PR MEDICATION LIST DOCUMENTED IN MEDICAL RECORD: ICD-10-PCS | Mod: HCNC,CPTII,S$GLB, | Performed by: FAMILY MEDICINE

## 2022-01-13 PROCEDURE — 1160F PR REVIEW ALL MEDS BY PRESCRIBER/CLIN PHARMACIST DOCUMENTED: ICD-10-PCS | Mod: HCNC,CPTII,S$GLB, | Performed by: FAMILY MEDICINE

## 2022-01-13 PROCEDURE — 1160F RVW MEDS BY RX/DR IN RCRD: CPT | Mod: HCNC,CPTII,S$GLB, | Performed by: FAMILY MEDICINE

## 2022-01-13 PROCEDURE — 3075F PR MOST RECENT SYSTOLIC BLOOD PRESS GE 130-139MM HG: ICD-10-PCS | Mod: HCNC,CPTII,S$GLB, | Performed by: FAMILY MEDICINE

## 2022-01-13 PROCEDURE — 99999 PR PBB SHADOW E&M-EST. PATIENT-LVL III: ICD-10-PCS | Mod: PBBFAC,HCNC,, | Performed by: FAMILY MEDICINE

## 2022-01-13 PROCEDURE — 3079F DIAST BP 80-89 MM HG: CPT | Mod: HCNC,CPTII,S$GLB, | Performed by: FAMILY MEDICINE

## 2022-01-13 PROCEDURE — 3288F FALL RISK ASSESSMENT DOCD: CPT | Mod: HCNC,CPTII,S$GLB, | Performed by: FAMILY MEDICINE

## 2022-01-13 PROCEDURE — 3079F PR MOST RECENT DIASTOLIC BLOOD PRESSURE 80-89 MM HG: ICD-10-PCS | Mod: HCNC,CPTII,S$GLB, | Performed by: FAMILY MEDICINE

## 2022-01-13 PROCEDURE — 99214 PR OFFICE/OUTPT VISIT, EST, LEVL IV, 30-39 MIN: ICD-10-PCS | Mod: HCNC,S$GLB,, | Performed by: FAMILY MEDICINE

## 2022-01-13 PROCEDURE — 1126F AMNT PAIN NOTED NONE PRSNT: CPT | Mod: HCNC,CPTII,S$GLB, | Performed by: FAMILY MEDICINE

## 2022-01-13 PROCEDURE — 3075F SYST BP GE 130 - 139MM HG: CPT | Mod: HCNC,CPTII,S$GLB, | Performed by: FAMILY MEDICINE

## 2022-01-13 PROCEDURE — 1126F PR PAIN SEVERITY QUANTIFIED, NO PAIN PRESENT: ICD-10-PCS | Mod: HCNC,CPTII,S$GLB, | Performed by: FAMILY MEDICINE

## 2022-01-13 PROCEDURE — 1101F PT FALLS ASSESS-DOCD LE1/YR: CPT | Mod: HCNC,CPTII,S$GLB, | Performed by: FAMILY MEDICINE

## 2022-01-13 RX ORDER — AMLODIPINE BESYLATE 5 MG/1
5 TABLET ORAL DAILY
Qty: 90 TABLET | Refills: 3 | Status: SHIPPED | OUTPATIENT
Start: 2022-01-13 | End: 2023-01-23

## 2022-01-13 NOTE — PROGRESS NOTES
"Subjective:   Patient ID: Pravin Ferrari is a 81 y.o. male.  Chief Complaint:  ER Follow up- allergic reaction to medication.    Presents for ER follow-up  Evaluated for angioedema of right upper lip likely due to ACE-inhibitor use  Given Benadryl and Solu-Medrol in the ER  Did not have any respiratory difficulty or compromise  CBC and CMP were normal  CT head social chronic microvascular changes, otherwise normal  Lisinopril was discontinued  Recommended change to amlodipine 5 mg daily  Has not picked up prescription yet  Swelling significantly improved and has not had any recurrence in the last 24 hours.  Visit last month for annual physical exam which was stable  Is scheduled to see [facility] regarding his probable mild cognitive impairment  No additional complaints today      Current Outpatient Medications:     multivitamin (THERAGRAN) per tablet, Take 1 tablet by mouth once daily., Disp: , Rfl:     amLODIPine (NORVASC) 5 MG tablet, Take 1 tablet (5 mg total) by mouth once daily., Disp: 90 tablet, Rfl: 3    Review of Systems   Constitutional: Negative for chills, fatigue and fever.   Respiratory: Negative for cough and shortness of breath.    Cardiovascular: Negative for chest pain and leg swelling.   Gastrointestinal: Negative for abdominal pain, diarrhea, nausea and vomiting.   Genitourinary: Negative for difficulty urinating.   Musculoskeletal: Negative for back pain, myalgias and neck pain.   Skin: Negative for rash.   Psychiatric/Behavioral: Negative for sleep disturbance. The patient is not nervous/anxious.      Objective:   /86 (BP Location: Left arm, Patient Position: Sitting, BP Method: Medium (Manual))   Pulse 74   Temp 98.6 °F (37 °C) (Temporal)   Ht 5' 7.5" (1.715 m)   Wt 72.1 kg (158 lb 15.2 oz)   SpO2 95%   BMI 24.53 kg/m²     Physical Exam  Vitals and nursing note reviewed.   Constitutional:       Appearance: Normal appearance. He is well-developed and normal weight. "   HENT:      Nose: Nose normal.      Right Sinus: No maxillary sinus tenderness or frontal sinus tenderness.      Left Sinus: No maxillary sinus tenderness or frontal sinus tenderness.      Mouth/Throat:      Lips: Pink.      Mouth: Mucous membranes are moist.      Tongue: No lesions. Tongue does not deviate from midline.      Pharynx: Oropharynx is clear. Uvula midline. No pharyngeal swelling, oropharyngeal exudate, posterior oropharyngeal erythema or uvula swelling.   Neck:      Thyroid: No thyroid mass, thyromegaly or thyroid tenderness.      Vascular: No JVD.   Cardiovascular:      Rate and Rhythm: Normal rate and regular rhythm.      Pulses:           Radial pulses are 2+ on the right side and 2+ on the left side.      Heart sounds: Normal heart sounds. No murmur heard.  No friction rub. No gallop.    Pulmonary:      Effort: Pulmonary effort is normal.      Breath sounds: Normal breath sounds. No wheezing, rhonchi or rales.   Abdominal:      General: There is no distension.      Palpations: Abdomen is soft.      Tenderness: There is no abdominal tenderness.   Musculoskeletal:      Right lower leg: No edema.      Left lower leg: No edema.   Skin:     General: Skin is warm and dry.      Findings: No rash.   Neurological:      Mental Status: He is alert.      Coordination: Coordination is intact.      Gait: Gait is intact.   Psychiatric:         Attention and Perception: Attention normal.         Mood and Affect: Mood and affect normal.       Assessment:       ICD-10-CM ICD-9-CM   1. 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  Dispense: 90 tablet; Refill: 3  Start amlodipine 5 mg daily  Recheck blood pressure next visit    Return to clinic 1 month     30 minutes of total time spent on the encounter, which includes face to face time and non-face to face time preparing to see the patient (eg, review of tests), Obtaining and/or reviewing separately obtained history, documenting clinical information in the electronic or other health record, independently interpreting results (not separately reported) and communicating results to the patient/family/caregiver, or Care coordination (not separately reported).

## 2022-02-15 ENCOUNTER — OFFICE VISIT (OUTPATIENT)
Dept: INTERNAL MEDICINE | Facility: CLINIC | Age: 82
End: 2022-02-15
Payer: MEDICARE

## 2022-02-15 VITALS
HEIGHT: 68 IN | SYSTOLIC BLOOD PRESSURE: 130 MMHG | TEMPERATURE: 98 F | DIASTOLIC BLOOD PRESSURE: 72 MMHG | BODY MASS INDEX: 23.99 KG/M2 | OXYGEN SATURATION: 95 % | HEART RATE: 89 BPM | WEIGHT: 158.31 LBS

## 2022-02-15 DIAGNOSIS — R41.3 MEMORY DIFFICULTIES: ICD-10-CM

## 2022-02-15 DIAGNOSIS — I10 ESSENTIAL HYPERTENSION: Primary | ICD-10-CM

## 2022-02-15 DIAGNOSIS — T46.4X5A ANGIOEDEMA DUE TO ANGIOTENSIN CONVERTING ENZYME INHIBITOR (ACE-I): ICD-10-CM

## 2022-02-15 DIAGNOSIS — T78.3XXA ANGIOEDEMA DUE TO ANGIOTENSIN CONVERTING ENZYME INHIBITOR (ACE-I): ICD-10-CM

## 2022-02-15 PROCEDURE — 1160F RVW MEDS BY RX/DR IN RCRD: CPT | Mod: HCNC,CPTII,S$GLB, | Performed by: FAMILY MEDICINE

## 2022-02-15 PROCEDURE — 1126F PR PAIN SEVERITY QUANTIFIED, NO PAIN PRESENT: ICD-10-PCS | Mod: HCNC,CPTII,S$GLB, | Performed by: FAMILY MEDICINE

## 2022-02-15 PROCEDURE — 99999 PR PBB SHADOW E&M-EST. PATIENT-LVL III: ICD-10-PCS | Mod: PBBFAC,HCNC,, | Performed by: FAMILY MEDICINE

## 2022-02-15 PROCEDURE — 3078F PR MOST RECENT DIASTOLIC BLOOD PRESSURE < 80 MM HG: ICD-10-PCS | Mod: HCNC,CPTII,S$GLB, | Performed by: FAMILY MEDICINE

## 2022-02-15 PROCEDURE — 99999 PR PBB SHADOW E&M-EST. PATIENT-LVL III: CPT | Mod: PBBFAC,HCNC,, | Performed by: FAMILY MEDICINE

## 2022-02-15 PROCEDURE — 1101F PR PT FALLS ASSESS DOC 0-1 FALLS W/OUT INJ PAST YR: ICD-10-PCS | Mod: HCNC,CPTII,S$GLB, | Performed by: FAMILY MEDICINE

## 2022-02-15 PROCEDURE — 3078F DIAST BP <80 MM HG: CPT | Mod: HCNC,CPTII,S$GLB, | Performed by: FAMILY MEDICINE

## 2022-02-15 PROCEDURE — 3075F SYST BP GE 130 - 139MM HG: CPT | Mod: HCNC,CPTII,S$GLB, | Performed by: FAMILY MEDICINE

## 2022-02-15 PROCEDURE — 3288F FALL RISK ASSESSMENT DOCD: CPT | Mod: HCNC,CPTII,S$GLB, | Performed by: FAMILY MEDICINE

## 2022-02-15 PROCEDURE — 1126F AMNT PAIN NOTED NONE PRSNT: CPT | Mod: HCNC,CPTII,S$GLB, | Performed by: FAMILY MEDICINE

## 2022-02-15 PROCEDURE — 1159F PR MEDICATION LIST DOCUMENTED IN MEDICAL RECORD: ICD-10-PCS | Mod: HCNC,CPTII,S$GLB, | Performed by: FAMILY MEDICINE

## 2022-02-15 PROCEDURE — 3288F PR FALLS RISK ASSESSMENT DOCUMENTED: ICD-10-PCS | Mod: HCNC,CPTII,S$GLB, | Performed by: FAMILY MEDICINE

## 2022-02-15 PROCEDURE — 1101F PT FALLS ASSESS-DOCD LE1/YR: CPT | Mod: HCNC,CPTII,S$GLB, | Performed by: FAMILY MEDICINE

## 2022-02-15 PROCEDURE — 99214 PR OFFICE/OUTPT VISIT, EST, LEVL IV, 30-39 MIN: ICD-10-PCS | Mod: HCNC,S$GLB,, | Performed by: FAMILY MEDICINE

## 2022-02-15 PROCEDURE — 1160F PR REVIEW ALL MEDS BY PRESCRIBER/CLIN PHARMACIST DOCUMENTED: ICD-10-PCS | Mod: HCNC,CPTII,S$GLB, | Performed by: FAMILY MEDICINE

## 2022-02-15 PROCEDURE — 99214 OFFICE O/P EST MOD 30 MIN: CPT | Mod: HCNC,S$GLB,, | Performed by: FAMILY MEDICINE

## 2022-02-15 PROCEDURE — 1159F MED LIST DOCD IN RCRD: CPT | Mod: HCNC,CPTII,S$GLB, | Performed by: FAMILY MEDICINE

## 2022-02-15 PROCEDURE — 3075F PR MOST RECENT SYSTOLIC BLOOD PRESS GE 130-139MM HG: ICD-10-PCS | Mod: HCNC,CPTII,S$GLB, | Performed by: FAMILY MEDICINE

## 2022-02-15 NOTE — PROGRESS NOTES
"Subjective:   Patient ID: Pravin Ferrari is a 81 y.o. male.  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  Denies side effects.  No recurrent angioedema.  Blood pressure readings at home normal  No shortness of breath, swelling, chest pain, claudication.      Current Outpatient Medications:     amLODIPine (NORVASC) 5 MG tablet, Take 1 tablet (5 mg total) by mouth once daily., Disp: 90 tablet, Rfl: 3    multivitamin (THERAGRAN) per tablet, Take 1 tablet by mouth once daily., Disp: , Rfl:     Review of Systems   Constitutional: Negative for fatigue.   Eyes: Negative for visual disturbance.   Respiratory: Negative for cough, chest tightness and shortness of breath.    Cardiovascular: Negative for chest pain, palpitations and leg swelling.   Gastrointestinal: Negative for abdominal pain, diarrhea, nausea and vomiting.   Genitourinary: Negative for difficulty urinating.   Musculoskeletal: Negative for myalgias and neck pain.   Skin: Negative for rash.   Neurological: Negative for dizziness, syncope, weakness, light-headedness and headaches.   Psychiatric/Behavioral: Negative for sleep disturbance.     Objective:   /72 (BP Location: Right arm, Patient Position: Sitting, BP Method: Medium (Manual))   Pulse 89   Temp 97.8 °F (36.6 °C) (Tympanic)   Ht 5' 7.5" (1.715 m)   Wt 71.8 kg (158 lb 4.6 oz)   SpO2 95%   BMI 24.43 kg/m²     Physical Exam  Vitals and nursing note reviewed.   Constitutional:       Appearance: Normal appearance. He is well-developed and normal weight.   Neck:      Thyroid: No thyroid " mass, thyromegaly or thyroid tenderness.      Vascular: No JVD.   Cardiovascular:      Rate and Rhythm: Normal rate and regular rhythm.      Pulses:           Radial pulses are 2+ on the right side and 2+ on the left side.      Heart sounds: Normal heart sounds. No murmur heard.  No friction rub. No gallop.    Pulmonary:      Effort: Pulmonary effort is normal.      Breath sounds: Normal breath sounds. No wheezing, rhonchi or rales.   Abdominal:      General: There is no distension.      Palpations: Abdomen is soft.      Tenderness: There is no abdominal tenderness.   Musculoskeletal:      Right lower leg: No edema.      Left lower leg: No edema.   Skin:     General: Skin is warm and dry.      Findings: No rash.   Neurological:      Mental Status: He is alert.      Coordination: Coordination is intact.      Gait: Gait is intact.   Psychiatric:         Attention and Perception: Attention normal.         Mood and Affect: Mood and affect normal.       Assessment:       ICD-10-CM ICD-9-CM   1. Essential hypertension  I10 401.9   2. 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 Memory difficulties  R41.3 780.93     Plan:   Essential hypertension  Controlled.  Stable.  Asymptomatic.  BP at goal.  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 Soriano  MRI has been done  Has additional evaluation with neuropsychology and follow-up visit scheduled to determine if any additional medication or treatment recommended    Return to clinic 6 months for annual physical exam or sooner if needed

## 2022-08-22 ENCOUNTER — OFFICE VISIT (OUTPATIENT)
Dept: INTERNAL MEDICINE | Facility: CLINIC | Age: 82
End: 2022-08-22
Payer: MEDICARE

## 2022-08-22 VITALS
BODY MASS INDEX: 23.52 KG/M2 | WEIGHT: 155.19 LBS | HEART RATE: 96 BPM | TEMPERATURE: 98 F | DIASTOLIC BLOOD PRESSURE: 84 MMHG | OXYGEN SATURATION: 97 % | HEIGHT: 68 IN | SYSTOLIC BLOOD PRESSURE: 132 MMHG

## 2022-08-22 DIAGNOSIS — R41.3 MEMORY DIFFICULTIES: ICD-10-CM

## 2022-08-22 DIAGNOSIS — I10 ESSENTIAL HYPERTENSION: Primary | ICD-10-CM

## 2022-08-22 DIAGNOSIS — I67.2 CEREBRAL ATHEROSCLEROSIS: ICD-10-CM

## 2022-08-22 PROCEDURE — 3079F PR MOST RECENT DIASTOLIC BLOOD PRESSURE 80-89 MM HG: ICD-10-PCS | Mod: CPTII,S$GLB,, | Performed by: FAMILY MEDICINE

## 2022-08-22 PROCEDURE — 1126F AMNT PAIN NOTED NONE PRSNT: CPT | Mod: CPTII,S$GLB,, | Performed by: FAMILY MEDICINE

## 2022-08-22 PROCEDURE — 99214 OFFICE O/P EST MOD 30 MIN: CPT | Mod: S$GLB,,, | Performed by: FAMILY MEDICINE

## 2022-08-22 PROCEDURE — 1159F PR MEDICATION LIST DOCUMENTED IN MEDICAL RECORD: ICD-10-PCS | Mod: CPTII,S$GLB,, | Performed by: FAMILY MEDICINE

## 2022-08-22 PROCEDURE — 1160F RVW MEDS BY RX/DR IN RCRD: CPT | Mod: CPTII,S$GLB,, | Performed by: FAMILY MEDICINE

## 2022-08-22 PROCEDURE — 3079F DIAST BP 80-89 MM HG: CPT | Mod: CPTII,S$GLB,, | Performed by: FAMILY MEDICINE

## 2022-08-22 PROCEDURE — 99999 PR PBB SHADOW E&M-EST. PATIENT-LVL III: ICD-10-PCS | Mod: PBBFAC,,, | Performed by: FAMILY MEDICINE

## 2022-08-22 PROCEDURE — 3075F SYST BP GE 130 - 139MM HG: CPT | Mod: CPTII,S$GLB,, | Performed by: FAMILY MEDICINE

## 2022-08-22 PROCEDURE — 99214 PR OFFICE/OUTPT VISIT, EST, LEVL IV, 30-39 MIN: ICD-10-PCS | Mod: S$GLB,,, | Performed by: FAMILY MEDICINE

## 2022-08-22 PROCEDURE — 1126F PR PAIN SEVERITY QUANTIFIED, NO PAIN PRESENT: ICD-10-PCS | Mod: CPTII,S$GLB,, | Performed by: FAMILY MEDICINE

## 2022-08-22 PROCEDURE — 3075F PR MOST RECENT SYSTOLIC BLOOD PRESS GE 130-139MM HG: ICD-10-PCS | Mod: CPTII,S$GLB,, | Performed by: FAMILY MEDICINE

## 2022-08-22 PROCEDURE — 99999 PR PBB SHADOW E&M-EST. PATIENT-LVL III: CPT | Mod: PBBFAC,,, | Performed by: FAMILY MEDICINE

## 2022-08-22 PROCEDURE — 1160F PR REVIEW ALL MEDS BY PRESCRIBER/CLIN PHARMACIST DOCUMENTED: ICD-10-PCS | Mod: CPTII,S$GLB,, | Performed by: FAMILY MEDICINE

## 2022-08-22 PROCEDURE — 1159F MED LIST DOCD IN RCRD: CPT | Mod: CPTII,S$GLB,, | Performed by: FAMILY MEDICINE

## 2022-08-22 RX ORDER — ASPIRIN 81 MG/1
81 TABLET ORAL DAILY
COMMUNITY

## 2022-08-22 RX ORDER — DONEPEZIL HYDROCHLORIDE 10 MG/1
10 TABLET, FILM COATED ORAL NIGHTLY
COMMUNITY
Start: 2022-07-21 | End: 2024-01-17 | Stop reason: SDUPTHER

## 2022-08-23 NOTE — PROGRESS NOTES
Subjective:   Patient ID: Pravin Ferrari is a 81 y.o. male.  Chief Complaint:  Follow-up    Presents with wife for follow-up on chronic medical conditions     Last visit February 2022     - Essential hypertension  Controlled.  Stable.  Asymptomatic.    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 Soriano  MRI done showed atrophy and additional cerebral atherosclerosis changes  Recommended low-dose aspirin 81 mg daily a did not recommend to start statin  Also his undergone evaluation by neuropsychology   Mild but stable anxiety   Recommended, started, and is compliant with Aricept 10 mg daily   Reports tolerating medication without any significant side effects  Has follow-up visit scheduled at [facility] next month the     No new complaints or concerns today           Current Outpatient Medications:     amLODIPine (NORVASC) 5 MG tablet, Take 1 tablet (5 mg total) by mouth once daily., Disp: 90 tablet, Rfl: 3    aspirin (ECOTRIN) 81 MG EC tablet, Take 81 mg by mouth once daily., Disp: , Rfl:     donepeziL (ARICEPT) 10 MG tablet, Take 10 mg by mouth nightly., Disp: , Rfl:     multivitamin (THERAGRAN) per tablet, Take 1 tablet by mouth once daily., Disp: , Rfl:     Review of Systems   Constitutional: Negative for fatigue.   HENT: Positive for hearing loss.    Eyes: Negative for visual disturbance.   Respiratory: Negative for cough, chest tightness and shortness of breath.    Cardiovascular: Negative for chest pain, palpitations and leg swelling.   Gastrointestinal: Negative for abdominal pain, diarrhea, nausea and vomiting.   Genitourinary: Negative for difficulty urinating.   Musculoskeletal: Negative for myalgias and neck  "pain.   Skin: Negative for rash.   Neurological: Negative for dizziness, syncope, weakness, light-headedness and headaches.   Psychiatric/Behavioral: Negative for agitation, behavioral problems, confusion, decreased concentration, dysphoric mood, hallucinations and sleep disturbance. The patient is not nervous/anxious and is not hyperactive.      Objective:   /84 (BP Location: Left arm, Patient Position: Sitting, BP Method: Small (Manual))   Pulse 96   Temp 97.8 °F (36.6 °C) (Tympanic)   Ht 5' 7.5" (1.715 m)   Wt 70.4 kg (155 lb 3.3 oz)   SpO2 97%   BMI 23.95 kg/m²     Physical Exam  Vitals and nursing note reviewed.   Constitutional:       Appearance: Normal appearance. He is well-developed and normal weight.   Neck:      Thyroid: No thyroid mass, thyromegaly or thyroid tenderness.      Vascular: No JVD.   Cardiovascular:      Rate and Rhythm: Normal rate and regular rhythm.      Heart sounds: Normal heart sounds. No murmur heard.    No friction rub. No gallop.   Pulmonary:      Effort: Pulmonary effort is normal.      Breath sounds: Normal breath sounds. No wheezing, rhonchi or rales.   Abdominal:      General: There is no distension.      Palpations: Abdomen is soft.      Tenderness: There is no abdominal tenderness.   Musculoskeletal:      Right lower leg: No edema.      Left lower leg: No edema.   Skin:     Findings: No rash.   Neurological:      General: No focal deficit present.      Mental Status: He is alert. Mental status is at baseline.   Psychiatric:         Mood and Affect: Mood and affect normal.         Behavior: Behavior normal.         Thought Content: Thought content normal.         Judgment: Judgment normal.       Assessment:       ICD-10-CM ICD-9-CM   1. Essential hypertension  I10 401.9   2. Memory difficulties  R41.3 780.93   3. Cerebral atherosclerosis  I67.2 437.0     Plan:   Essential hypertension  Controlled.  Stable.  Asymptomatic.  BP at goal.    Continue amlodipine 5 mg daily "     Memory difficulties  Cerebral atherosclerosis  Stable.    Continue aspirin 81 mg daily.  Continue Aricept 10 mg daily.    Follow-up [facility] as planned     Return to clinic 4 months for annual physical exam or sooner if needed

## 2022-09-07 ENCOUNTER — PES CALL (OUTPATIENT)
Dept: ADMINISTRATIVE | Facility: CLINIC | Age: 82
End: 2022-09-07
Payer: MEDICARE

## 2022-11-17 ENCOUNTER — TELEPHONE (OUTPATIENT)
Dept: INTERNAL MEDICINE | Facility: CLINIC | Age: 82
End: 2022-11-17
Payer: MEDICARE

## 2022-11-17 RX ORDER — PROMETHAZINE HYDROCHLORIDE AND DEXTROMETHORPHAN HYDROBROMIDE 6.25; 15 MG/5ML; MG/5ML
5 SYRUP ORAL EVERY 6 HOURS PRN
Qty: 180 ML | Refills: 0 | Status: SHIPPED | OUTPATIENT
Start: 2022-11-17 | End: 2022-12-07

## 2022-11-17 NOTE — TELEPHONE ENCOUNTER
I sent a prescription for cough syrup to his pharmacy   However, I recommend he be seen and undergo COVID and flu testing even if not having a fever

## 2022-11-17 NOTE — TELEPHONE ENCOUNTER
----- Message from [place] sent at 11/17/2022 11:35 AM CST -----  Contact: [phone]  Radha is calling in regards to getting pt a prescription. She stated that he is having a really bad dry cough. Please call her back at [phone].           [id number] - [place] - [id number] AT [facility]  [address] [place] [address]  Phone: [phone] Fax: [phone]      Thanks KB

## 2022-11-17 NOTE — TELEPHONE ENCOUNTER
Spoke with pt wife; she stated pt been having a dry cough since yesterday and runny nose; no fever, aches, fever; pt wife is asking for medication to be called in since they are going out of town this weekend /LD

## 2022-11-18 ENCOUNTER — TELEPHONE (OUTPATIENT)
Dept: INTERNAL MEDICINE | Facility: CLINIC | Age: 82
End: 2022-11-18
Payer: MEDICARE

## 2022-11-18 NOTE — TELEPHONE ENCOUNTER
Spoke with pt wife; MA informed her PCP sent over cough syrup to pharmacy for pt however he should get tested for COVID & flu at Urgent Care; pt wife verbalized understanding /LD

## 2022-12-07 ENCOUNTER — LAB VISIT (OUTPATIENT)
Dept: LAB | Facility: HOSPITAL | Age: 82
End: 2022-12-07
Attending: FAMILY MEDICINE
Payer: MEDICARE

## 2022-12-07 ENCOUNTER — OFFICE VISIT (OUTPATIENT)
Dept: INTERNAL MEDICINE | Facility: CLINIC | Age: 82
End: 2022-12-07
Payer: MEDICARE

## 2022-12-07 VITALS
OXYGEN SATURATION: 97 % | SYSTOLIC BLOOD PRESSURE: 126 MMHG | HEART RATE: 80 BPM | DIASTOLIC BLOOD PRESSURE: 74 MMHG | BODY MASS INDEX: 23.69 KG/M2 | HEIGHT: 68 IN | WEIGHT: 156.31 LBS

## 2022-12-07 DIAGNOSIS — N18.31 STAGE 3A CHRONIC KIDNEY DISEASE: ICD-10-CM

## 2022-12-07 DIAGNOSIS — Z85.46 HISTORY OF PROSTATE CANCER: ICD-10-CM

## 2022-12-07 DIAGNOSIS — I10 ESSENTIAL HYPERTENSION: ICD-10-CM

## 2022-12-07 DIAGNOSIS — Z23 NEED FOR INFLUENZA VACCINATION: ICD-10-CM

## 2022-12-07 DIAGNOSIS — R41.3 MEMORY DIFFICULTIES: ICD-10-CM

## 2022-12-07 DIAGNOSIS — Z00.00 ANNUAL PHYSICAL EXAM: Primary | ICD-10-CM

## 2022-12-07 DIAGNOSIS — I67.2 CEREBRAL ATHEROSCLEROSIS: ICD-10-CM

## 2022-12-07 DIAGNOSIS — Z00.00 ANNUAL PHYSICAL EXAM: ICD-10-CM

## 2022-12-07 LAB
ALBUMIN SERPL BCP-MCNC: 3.6 G/DL (ref 3.5–5.2)
ALP SERPL-CCNC: 63 U/L (ref 55–135)
ALT SERPL W/O P-5'-P-CCNC: 14 U/L (ref 10–44)
ANION GAP SERPL CALC-SCNC: 9 MMOL/L (ref 8–16)
AST SERPL-CCNC: 21 U/L (ref 10–40)
BILIRUB SERPL-MCNC: 0.7 MG/DL (ref 0.1–1)
BUN SERPL-MCNC: 19 MG/DL (ref 8–23)
CALCIUM SERPL-MCNC: 9.2 MG/DL (ref 8.7–10.5)
CHLORIDE SERPL-SCNC: 106 MMOL/L (ref 95–110)
CHOLEST SERPL-MCNC: 176 MG/DL (ref 120–199)
CHOLEST/HDLC SERPL: 3.3 {RATIO} (ref 2–5)
CO2 SERPL-SCNC: 25 MMOL/L (ref 23–29)
CREAT SERPL-MCNC: 1 MG/DL (ref 0.5–1.4)
ERYTHROCYTE [DISTWIDTH] IN BLOOD BY AUTOMATED COUNT: 12.9 % (ref 11.5–14.5)
EST. GFR  (NO RACE VARIABLE): >60 ML/MIN/1.73 M^2
GLUCOSE SERPL-MCNC: 83 MG/DL (ref 70–110)
HCT VFR BLD AUTO: 43.1 % (ref 40–54)
HDLC SERPL-MCNC: 54 MG/DL (ref 40–75)
HDLC SERPL: 30.7 % (ref 20–50)
HGB BLD-MCNC: 13.8 G/DL (ref 14–18)
LDLC SERPL CALC-MCNC: 111.4 MG/DL (ref 63–159)
MCH RBC QN AUTO: 30.2 PG (ref 27–31)
MCHC RBC AUTO-ENTMCNC: 32 G/DL (ref 32–36)
MCV RBC AUTO: 94 FL (ref 82–98)
NONHDLC SERPL-MCNC: 122 MG/DL
PLATELET # BLD AUTO: 247 K/UL (ref 150–450)
PMV BLD AUTO: 11.9 FL (ref 9.2–12.9)
POTASSIUM SERPL-SCNC: 4.1 MMOL/L (ref 3.5–5.1)
PROT SERPL-MCNC: 6.9 G/DL (ref 6–8.4)
RBC # BLD AUTO: 4.57 M/UL (ref 4.6–6.2)
SODIUM SERPL-SCNC: 140 MMOL/L (ref 136–145)
TRIGL SERPL-MCNC: 53 MG/DL (ref 30–150)
TSH SERPL DL<=0.005 MIU/L-ACNC: 1.8 UIU/ML (ref 0.4–4)
WBC # BLD AUTO: 5.94 K/UL (ref 3.9–12.7)

## 2022-12-07 PROCEDURE — 99999 PR PBB SHADOW E&M-EST. PATIENT-LVL III: CPT | Mod: PBBFAC,,, | Performed by: FAMILY MEDICINE

## 2022-12-07 PROCEDURE — 1126F PR PAIN SEVERITY QUANTIFIED, NO PAIN PRESENT: ICD-10-PCS | Mod: CPTII,S$GLB,, | Performed by: FAMILY MEDICINE

## 2022-12-07 PROCEDURE — 80053 COMPREHEN METABOLIC PANEL: CPT | Performed by: FAMILY MEDICINE

## 2022-12-07 PROCEDURE — 3078F DIAST BP <80 MM HG: CPT | Mod: CPTII,S$GLB,, | Performed by: FAMILY MEDICINE

## 2022-12-07 PROCEDURE — 1126F AMNT PAIN NOTED NONE PRSNT: CPT | Mod: CPTII,S$GLB,, | Performed by: FAMILY MEDICINE

## 2022-12-07 PROCEDURE — 90694 VACC AIIV4 NO PRSRV 0.5ML IM: CPT | Mod: S$GLB,,, | Performed by: FAMILY MEDICINE

## 2022-12-07 PROCEDURE — 3074F PR MOST RECENT SYSTOLIC BLOOD PRESSURE < 130 MM HG: ICD-10-PCS | Mod: CPTII,S$GLB,, | Performed by: FAMILY MEDICINE

## 2022-12-07 PROCEDURE — 99397 PER PM REEVAL EST PAT 65+ YR: CPT | Mod: 25,S$GLB,, | Performed by: FAMILY MEDICINE

## 2022-12-07 PROCEDURE — 80061 LIPID PANEL: CPT | Performed by: FAMILY MEDICINE

## 2022-12-07 PROCEDURE — 99397 PR PREVENTIVE VISIT,EST,65 & OVER: ICD-10-PCS | Mod: 25,S$GLB,, | Performed by: FAMILY MEDICINE

## 2022-12-07 PROCEDURE — 3078F PR MOST RECENT DIASTOLIC BLOOD PRESSURE < 80 MM HG: ICD-10-PCS | Mod: CPTII,S$GLB,, | Performed by: FAMILY MEDICINE

## 2022-12-07 PROCEDURE — G0008 FLU VACCINE - QUADRIVALENT - ADJUVANTED: ICD-10-PCS | Mod: S$GLB,,, | Performed by: FAMILY MEDICINE

## 2022-12-07 PROCEDURE — 36415 COLL VENOUS BLD VENIPUNCTURE: CPT | Performed by: FAMILY MEDICINE

## 2022-12-07 PROCEDURE — 3074F SYST BP LT 130 MM HG: CPT | Mod: CPTII,S$GLB,, | Performed by: FAMILY MEDICINE

## 2022-12-07 PROCEDURE — 84443 ASSAY THYROID STIM HORMONE: CPT | Performed by: FAMILY MEDICINE

## 2022-12-07 PROCEDURE — G0008 ADMIN INFLUENZA VIRUS VAC: HCPCS | Mod: S$GLB,,, | Performed by: FAMILY MEDICINE

## 2022-12-07 PROCEDURE — 99999 PR PBB SHADOW E&M-EST. PATIENT-LVL III: ICD-10-PCS | Mod: PBBFAC,,, | Performed by: FAMILY MEDICINE

## 2022-12-07 PROCEDURE — 1159F PR MEDICATION LIST DOCUMENTED IN MEDICAL RECORD: ICD-10-PCS | Mod: CPTII,S$GLB,, | Performed by: FAMILY MEDICINE

## 2022-12-07 PROCEDURE — 1159F MED LIST DOCD IN RCRD: CPT | Mod: CPTII,S$GLB,, | Performed by: FAMILY MEDICINE

## 2022-12-07 PROCEDURE — 90694 FLU VACCINE - QUADRIVALENT - ADJUVANTED: ICD-10-PCS | Mod: S$GLB,,, | Performed by: FAMILY MEDICINE

## 2022-12-07 PROCEDURE — 85027 COMPLETE CBC AUTOMATED: CPT | Performed by: FAMILY MEDICINE

## 2022-12-07 RX ORDER — MEMANTINE HYDROCHLORIDE 10 MG/1
10 TABLET ORAL 2 TIMES DAILY
COMMUNITY
End: 2024-01-17 | Stop reason: SDUPTHER

## 2022-12-07 NOTE — PROGRESS NOTES
Subjective:   Patient ID: Pravin Ferrari is a 81 y.o. male.  Chief Complaint:  Follow-up     presents for annual physical exam     Last visit August 2022 for follow-up on hypertension with blood pressure controlled on amlodipine 5 mg daily after discontinuation of ACE-inhibitor due to angioedema    Last annual physical exam December 2021     Medical history for:  - Hypertension.  Amlodipine 5 mg daily.  Reports compliance.  Denies side effects.  Blood pressure controlled.  No shortness of breath, swelling, or chest pain.  - memory difficulty.  Establish with Neurology.  On Aricept 10 mg daily and Namenda 10 mg twice a day.  Reports compliance.  Denies side effects.  Overall cognitive function stable since last visit.  Some increased anxiety symptoms but no significant agitated or other behavioral issues.  Doing well per wife.  - Chronic kidney disease stage 3a but resolved on last BMP.    - Bilateral hearing loss.  Stable.  Not always compliant with hearing aids.    - History prostate cancer.  Followed by Dr. Seay.  Last visit April 2022.  Up-to-date with follow-up in serial PSAs.     Health maintenance needs include:  - COVID booster  - Shingles vaccine  - Flu vaccine    No new complaints or concerns today     Review of Systems   Constitutional:  Negative for chills, fatigue and fever.   HENT:  Positive for hearing loss. Negative for congestion, dental problem, ear discharge, ear pain, postnasal drip, rhinorrhea, sinus pressure, sinus pain, sore throat and trouble swallowing.    Eyes:  Negative for pain, redness and visual disturbance.   Respiratory:  Negative for cough, chest tightness, shortness of breath and wheezing.    Cardiovascular:  Negative for chest pain, palpitations and leg swelling.   Gastrointestinal:  Negative for abdominal pain, constipation, diarrhea, nausea and vomiting.   Endocrine: Negative for polydipsia, polyphagia and polyuria.   Genitourinary:  Negative for difficulty urinating, dysuria,  "flank pain, frequency, hematuria and urgency.   Musculoskeletal:  Negative for myalgias and neck pain.   Skin:  Negative for rash.   Neurological:  Negative for dizziness, tremors, seizures, syncope, facial asymmetry, speech difficulty, weakness, light-headedness, numbness and headaches.   Hematological:  Negative for adenopathy.   Psychiatric/Behavioral:  Positive for confusion. Negative for agitation, behavioral problems, decreased concentration, dysphoric mood, hallucinations and sleep disturbance. The patient is not nervous/anxious and is not hyperactive.      Objective:   /74 (BP Location: Right arm, Patient Position: Sitting, BP Method: Small (Manual))   Pulse 80   Ht 5' 7.5" (1.715 m)   Wt 70.9 kg (156 lb 4.9 oz)   SpO2 97%   BMI 24.12 kg/m²     Physical Exam  Vitals and nursing note reviewed.   Constitutional:       Appearance: Normal appearance. He is well-developed and normal weight.   HENT:      Right Ear: Decreased hearing noted. There is no impacted cerumen.      Left Ear: Decreased hearing noted. There is no impacted cerumen.   Neck:      Thyroid: No thyroid mass, thyromegaly or thyroid tenderness.      Vascular: No JVD.   Cardiovascular:      Rate and Rhythm: Normal rate and regular rhythm.      Pulses:           Radial pulses are 2+ on the right side and 2+ on the left side.      Heart sounds: Normal heart sounds. No murmur heard.    No friction rub. No gallop.   Pulmonary:      Effort: Pulmonary effort is normal.      Breath sounds: Normal breath sounds. No wheezing, rhonchi or rales.   Abdominal:      General: There is no distension.      Palpations: Abdomen is soft.      Tenderness: There is no abdominal tenderness.   Musculoskeletal:      Right lower leg: No edema.      Left lower leg: No edema.   Skin:     General: Skin is warm and dry.      Findings: No rash.   Neurological:      General: No focal deficit present.      Mental Status: He is alert. Mental status is at baseline.      " Coordination: Coordination is intact.      Gait: Gait is intact.   Psychiatric:         Attention and Perception: Attention normal.         Mood and Affect: Affect normal. Mood is anxious.         Speech: Speech normal.         Behavior: Behavior normal.         Thought Content: Thought content normal.         Cognition and Memory: Cognition is impaired.     Assessment:       ICD-10-CM ICD-9-CM   1. Annual physical exam  Z00.00 V70.0   2. Need for influenza vaccination  Z23 V04.81   3. Essential hypertension  I10 401.9   4. Stage 3a chronic kidney disease  N18.31 585.3   5. Cerebral atherosclerosis  I67.2 437.0   6. Memory difficulties  R41.3 780.93   7. History of prostate cancer  Z85.46 V10.46     Plan:   Annual physical exam  Need for influenza vaccination  -     CBC Without Differential; Future; Expected date: 12/07/2022  -     Comprehensive Metabolic Panel; Future; Expected date: 12/07/2022  -     Lipid Panel; Future; Expected date: 12/07/2022  -     TSH; Future; Expected date: 12/07/2022  -     Influenza - Quadrivalent (Adjuvanted)  Blood pressure normal.  BMI 24.  Overall exam stable.    Check labs.  Treat as indicated.    Reports completed shingles vaccine series   Eligible for COVID booster  Flu vaccine today    Essential hypertension  Controlled.  Stable.  Asymptomatic.  BP at goal.    Continue amlodipine 5 mg daily     Stage 3a chronic kidney disease  Stable.  Asymptomatic.    Last renal function panel with normal EGFR    Cerebral atherosclerosis  Memory difficulties  Continue aspirin 81 mg daily  Continue Namenda 10 mg twice a day  Continue Aricept 10 mg daily   Follow-up neurology as scheduled     History of prostate cancer  Up-to-date on follow-up/surveillance with Urology    Return to clinic 1 year for annual physical exam or sooner if needed

## 2023-01-11 ENCOUNTER — HOSPITAL ENCOUNTER (INPATIENT)
Facility: HOSPITAL | Age: 83
LOS: 1 days | Discharge: HOME HEALTH | DRG: 066 | End: 2023-01-13
Attending: EMERGENCY MEDICINE | Admitting: INTERNAL MEDICINE
Payer: MEDICARE

## 2023-01-11 ENCOUNTER — TELEPHONE (OUTPATIENT)
Dept: INTERNAL MEDICINE | Facility: CLINIC | Age: 83
End: 2023-01-11
Payer: MEDICARE

## 2023-01-11 DIAGNOSIS — R26.9 GAIT ABNORMALITY: Primary | ICD-10-CM

## 2023-01-11 DIAGNOSIS — R47.81 SLURRED SPEECH: ICD-10-CM

## 2023-01-11 DIAGNOSIS — I63.9 ACUTE CVA (CEREBROVASCULAR ACCIDENT): ICD-10-CM

## 2023-01-11 DIAGNOSIS — I63.9 STROKE: ICD-10-CM

## 2023-01-11 PROBLEM — F03.90 DEMENTIA: Status: ACTIVE | Noted: 2022-02-15

## 2023-01-11 LAB
ALBUMIN SERPL BCP-MCNC: 3.5 G/DL (ref 3.5–5.2)
ALP SERPL-CCNC: 66 U/L (ref 55–135)
ALT SERPL W/O P-5'-P-CCNC: 19 U/L (ref 10–44)
ANION GAP SERPL CALC-SCNC: 10 MMOL/L (ref 8–16)
AST SERPL-CCNC: 22 U/L (ref 10–40)
BASOPHILS # BLD AUTO: 0.04 K/UL (ref 0–0.2)
BASOPHILS NFR BLD: 0.6 % (ref 0–1.9)
BILIRUB SERPL-MCNC: 0.4 MG/DL (ref 0.1–1)
BILIRUB UR QL STRIP: NEGATIVE
BUN SERPL-MCNC: 22 MG/DL (ref 8–23)
CALCIUM SERPL-MCNC: 9.4 MG/DL (ref 8.7–10.5)
CHLORIDE SERPL-SCNC: 106 MMOL/L (ref 95–110)
CLARITY UR: CLEAR
CO2 SERPL-SCNC: 25 MMOL/L (ref 23–29)
COLOR UR: YELLOW
CREAT SERPL-MCNC: 1.1 MG/DL (ref 0.5–1.4)
CTP QC/QA: YES
CTP QC/QA: YES
DIFFERENTIAL METHOD: ABNORMAL
EOSINOPHIL # BLD AUTO: 0.3 K/UL (ref 0–0.5)
EOSINOPHIL NFR BLD: 4 % (ref 0–8)
ERYTHROCYTE [DISTWIDTH] IN BLOOD BY AUTOMATED COUNT: 12.8 % (ref 11.5–14.5)
EST. GFR  (NO RACE VARIABLE): >60 ML/MIN/1.73 M^2
GLUCOSE SERPL-MCNC: 91 MG/DL (ref 70–110)
GLUCOSE UR QL STRIP: NEGATIVE
HCT VFR BLD AUTO: 40.1 % (ref 40–54)
HCV AB SERPL QL IA: NEGATIVE
HEP C VIRUS HOLD SPECIMEN: NORMAL
HGB BLD-MCNC: 13.3 G/DL (ref 14–18)
HGB UR QL STRIP: NEGATIVE
HIV 1+2 AB+HIV1 P24 AG SERPL QL IA: NEGATIVE
IMM GRANULOCYTES # BLD AUTO: 0.02 K/UL (ref 0–0.04)
IMM GRANULOCYTES NFR BLD AUTO: 0.3 % (ref 0–0.5)
INR PPP: 1 (ref 0.8–1.2)
KETONES UR QL STRIP: ABNORMAL
LEUKOCYTE ESTERASE UR QL STRIP: NEGATIVE
LYMPHOCYTES # BLD AUTO: 1.1 K/UL (ref 1–4.8)
LYMPHOCYTES NFR BLD: 18.3 % (ref 18–48)
MCH RBC QN AUTO: 30.2 PG (ref 27–31)
MCHC RBC AUTO-ENTMCNC: 33.2 G/DL (ref 32–36)
MCV RBC AUTO: 91 FL (ref 82–98)
MONOCYTES # BLD AUTO: 1.3 K/UL (ref 0.3–1)
MONOCYTES NFR BLD: 21.5 % (ref 4–15)
NEUTROPHILS # BLD AUTO: 3.5 K/UL (ref 1.8–7.7)
NEUTROPHILS NFR BLD: 55.3 % (ref 38–73)
NITRITE UR QL STRIP: NEGATIVE
NRBC BLD-RTO: 0 /100 WBC
PH UR STRIP: 6 [PH] (ref 5–8)
PLATELET # BLD AUTO: 237 K/UL (ref 150–450)
PMV BLD AUTO: 10.7 FL (ref 9.2–12.9)
POC MOLECULAR INFLUENZA A AGN: NEGATIVE
POC MOLECULAR INFLUENZA B AGN: NEGATIVE
POCT GLUCOSE: 98 MG/DL (ref 70–110)
POTASSIUM SERPL-SCNC: 4.1 MMOL/L (ref 3.5–5.1)
PROT SERPL-MCNC: 6.7 G/DL (ref 6–8.4)
PROT UR QL STRIP: NEGATIVE
PROTHROMBIN TIME: 10.6 SEC (ref 9–12.5)
RBC # BLD AUTO: 4.4 M/UL (ref 4.6–6.2)
SARS-COV-2 RDRP RESP QL NAA+PROBE: NEGATIVE
SODIUM SERPL-SCNC: 141 MMOL/L (ref 136–145)
SP GR UR STRIP: 1.02 (ref 1–1.03)
TSH SERPL DL<=0.005 MIU/L-ACNC: 1.01 UIU/ML (ref 0.4–4)
URN SPEC COLLECT METH UR: ABNORMAL
UROBILINOGEN UR STRIP-ACNC: NEGATIVE EU/DL
WBC # BLD AUTO: 6.24 K/UL (ref 3.9–12.7)

## 2023-01-11 PROCEDURE — G0378 HOSPITAL OBSERVATION PER HR: HCPCS

## 2023-01-11 PROCEDURE — 82962 GLUCOSE BLOOD TEST: CPT

## 2023-01-11 PROCEDURE — 80053 COMPREHEN METABOLIC PANEL: CPT | Performed by: NURSE PRACTITIONER

## 2023-01-11 PROCEDURE — 86803 HEPATITIS C AB TEST: CPT | Performed by: EMERGENCY MEDICINE

## 2023-01-11 PROCEDURE — 93010 EKG 12-LEAD: ICD-10-PCS | Mod: ,,, | Performed by: INTERNAL MEDICINE

## 2023-01-11 PROCEDURE — 87635 SARS-COV-2 COVID-19 AMP PRB: CPT | Performed by: EMERGENCY MEDICINE

## 2023-01-11 PROCEDURE — 87389 HIV-1 AG W/HIV-1&-2 AB AG IA: CPT | Performed by: EMERGENCY MEDICINE

## 2023-01-11 PROCEDURE — 87502 INFLUENZA DNA AMP PROBE: CPT

## 2023-01-11 PROCEDURE — 83036 HEMOGLOBIN GLYCOSYLATED A1C: CPT | Performed by: EMERGENCY MEDICINE

## 2023-01-11 PROCEDURE — 25000003 PHARM REV CODE 250: Performed by: NURSE PRACTITIONER

## 2023-01-11 PROCEDURE — 81003 URINALYSIS AUTO W/O SCOPE: CPT | Performed by: EMERGENCY MEDICINE

## 2023-01-11 PROCEDURE — 85025 COMPLETE CBC W/AUTO DIFF WBC: CPT | Performed by: NURSE PRACTITIONER

## 2023-01-11 PROCEDURE — 93010 ELECTROCARDIOGRAM REPORT: CPT | Mod: ,,, | Performed by: INTERNAL MEDICINE

## 2023-01-11 PROCEDURE — 84443 ASSAY THYROID STIM HORMONE: CPT | Performed by: NURSE PRACTITIONER

## 2023-01-11 PROCEDURE — 36415 COLL VENOUS BLD VENIPUNCTURE: CPT | Performed by: EMERGENCY MEDICINE

## 2023-01-11 PROCEDURE — 93005 ELECTROCARDIOGRAM TRACING: CPT

## 2023-01-11 PROCEDURE — 63600175 PHARM REV CODE 636 W HCPCS: Performed by: NURSE PRACTITIONER

## 2023-01-11 PROCEDURE — 99285 EMERGENCY DEPT VISIT HI MDM: CPT | Mod: 25

## 2023-01-11 PROCEDURE — 96372 THER/PROPH/DIAG INJ SC/IM: CPT | Performed by: NURSE PRACTITIONER

## 2023-01-11 PROCEDURE — 85610 PROTHROMBIN TIME: CPT | Performed by: NURSE PRACTITIONER

## 2023-01-11 RX ORDER — ASPIRIN 81 MG/1
81 TABLET ORAL DAILY
Status: DISCONTINUED | OUTPATIENT
Start: 2023-01-12 | End: 2023-01-13 | Stop reason: HOSPADM

## 2023-01-11 RX ORDER — ASPIRIN 81 MG/1
81 TABLET ORAL DAILY
Status: DISCONTINUED | OUTPATIENT
Start: 2023-01-12 | End: 2023-01-11

## 2023-01-11 RX ORDER — CLOPIDOGREL BISULFATE 75 MG/1
75 TABLET ORAL DAILY
Status: DISCONTINUED | OUTPATIENT
Start: 2023-01-11 | End: 2023-01-13 | Stop reason: HOSPADM

## 2023-01-11 RX ORDER — AMOXICILLIN 250 MG
1 CAPSULE ORAL 2 TIMES DAILY
Status: DISCONTINUED | OUTPATIENT
Start: 2023-01-11 | End: 2023-01-13 | Stop reason: HOSPADM

## 2023-01-11 RX ORDER — SODIUM CHLORIDE 0.9 % (FLUSH) 0.9 %
10 SYRINGE (ML) INJECTION
Status: DISCONTINUED | OUTPATIENT
Start: 2023-01-11 | End: 2023-01-13 | Stop reason: HOSPADM

## 2023-01-11 RX ORDER — AMLODIPINE BESYLATE 5 MG/1
5 TABLET ORAL DAILY
Status: DISCONTINUED | OUTPATIENT
Start: 2023-01-12 | End: 2023-01-13 | Stop reason: HOSPADM

## 2023-01-11 RX ORDER — POLYETHYLENE GLYCOL 3350 17 G/17G
17 POWDER, FOR SOLUTION ORAL DAILY
Status: DISCONTINUED | OUTPATIENT
Start: 2023-01-12 | End: 2023-01-13 | Stop reason: HOSPADM

## 2023-01-11 RX ORDER — DONEPEZIL HYDROCHLORIDE 5 MG/1
10 TABLET, FILM COATED ORAL NIGHTLY
Status: DISCONTINUED | OUTPATIENT
Start: 2023-01-11 | End: 2023-01-13 | Stop reason: HOSPADM

## 2023-01-11 RX ORDER — QUETIAPINE FUMARATE 25 MG/1
25 TABLET, FILM COATED ORAL NIGHTLY
Status: DISCONTINUED | OUTPATIENT
Start: 2023-01-11 | End: 2023-01-13 | Stop reason: HOSPADM

## 2023-01-11 RX ORDER — MEMANTINE HYDROCHLORIDE 5 MG/1
10 TABLET ORAL 2 TIMES DAILY
Status: DISCONTINUED | OUTPATIENT
Start: 2023-01-11 | End: 2023-01-13 | Stop reason: HOSPADM

## 2023-01-11 RX ORDER — HYDRALAZINE HYDROCHLORIDE 20 MG/ML
10 INJECTION INTRAMUSCULAR; INTRAVENOUS EVERY 6 HOURS PRN
Status: DISCONTINUED | OUTPATIENT
Start: 2023-01-11 | End: 2023-01-13 | Stop reason: HOSPADM

## 2023-01-11 RX ORDER — ENOXAPARIN SODIUM 100 MG/ML
40 INJECTION SUBCUTANEOUS EVERY 24 HOURS
Status: DISCONTINUED | OUTPATIENT
Start: 2023-01-11 | End: 2023-01-13 | Stop reason: HOSPADM

## 2023-01-11 RX ORDER — ONDANSETRON 2 MG/ML
4 INJECTION INTRAMUSCULAR; INTRAVENOUS EVERY 6 HOURS PRN
Status: DISCONTINUED | OUTPATIENT
Start: 2023-01-11 | End: 2023-01-13 | Stop reason: HOSPADM

## 2023-01-11 RX ORDER — ATORVASTATIN CALCIUM 40 MG/1
40 TABLET, FILM COATED ORAL DAILY
Status: DISCONTINUED | OUTPATIENT
Start: 2023-01-12 | End: 2023-01-13 | Stop reason: HOSPADM

## 2023-01-11 RX ORDER — PROCHLORPERAZINE EDISYLATE 5 MG/ML
5 INJECTION INTRAMUSCULAR; INTRAVENOUS EVERY 6 HOURS PRN
Status: DISCONTINUED | OUTPATIENT
Start: 2023-01-11 | End: 2023-01-13 | Stop reason: HOSPADM

## 2023-01-11 RX ADMIN — CLOPIDOGREL BISULFATE 75 MG: 75 TABLET ORAL at 06:01

## 2023-01-11 RX ADMIN — ENOXAPARIN SODIUM 40 MG: 40 INJECTION SUBCUTANEOUS at 06:01

## 2023-01-11 NOTE — TELEPHONE ENCOUNTER
Spoke with pt spouse; she stated pt speech has been slurred and been off balance; she stated appt has been schedule for next available provider so pt can get evaluated /LD

## 2023-01-11 NOTE — ED PROVIDER NOTES
SCRIBE #1 NOTE: I, Esther Castanon/Cherelle Bhatti, am scribing for, and in the presence of, Can Reagan MD. I have scribed the entire note.       History     Chief Complaint   Patient presents with    Gait Problem     Unsteady gait and slurred speech since yesterday     Review of patient's allergies indicates:   Allergen Reactions    Lisinopril Swelling         History of Present Illness     HPI    1/11/2023, 2:03 PM  History obtained from family members  HPI/ROS limited secondary to dementia      History of Present Illness: Pravin Ferrari is a 82 y.o. male patient with a PMHx of prostate cancer, HTN, and dementia who presents to the Emergency Department for evaluation of an unsteady gait and speech changes which onset 3 days ago. Per the family members, the pt is not walking straight, and when he speaks, his sentences are incomplete and mumbled. However, his symptoms have improved since yesterday, per the family members. Pt's family members also state that the pt has had a dry cough, bilateral foot paresthesias at night, and R leg cramps, but has not had any fevers, V/D, rhinorrhea, or weakness. They also state that the pt has denied having any abdominal pain or CP, but report that the pt would not complain of these things due to his dementia. No prior Tx reported. No further complaints or concerns at this time.     Arrival mode: Personal vehicle    PCP: José Miguel Harrington MD        Past Medical History:  Past Medical History:   Diagnosis Date    Acute CVA (cerebrovascular accident) 1/11/2023    Cancer     prostate    [place] (hard of hearing)     bilateral ears    Hypertension     pt states no       Past Surgical History:  Past Surgical History:   Procedure Laterality Date    COLONOSCOPY N/A 3/19/2019    Procedure: COLONOSCOPY;  Surgeon: Korin Cavazos MD;  Location: [facility];  Service: Endoscopy;  Laterality: N/A;    EYE SURGERY      HERNIA REPAIR      PROSTATE SURGERY           Family History:  Family History  "  Problem Relation Age of Onset    No Known Problems Mother     Aneurysm Father     Cerebral aneurysm Father     Prostate cancer Brother     Prostate cancer Son        Social History:  Social History     Tobacco Use    Smoking status: Never    Smokeless tobacco: Never   Substance and Sexual Activity    Alcohol use: Yes     Comment: drinks one beer every "once in a while during summer"    Drug use: No    Sexual activity: Not on file        Review of Systems     Review of Systems   Unable to perform ROS: Other (Dementia)      Physical Exam     Initial Vitals [01/11/23 1232]   BP Pulse Resp Temp SpO2   132/66 80 20 97.8 °F (36.6 °C) 98 %      MAP       --          Physical Exam  Nursing Notes and Vital Signs Reviewed.   Constitutional: Patient is in no acute distress. Well-developed and well-nourished.  Head: Atraumatic. Normocephalic.  Eyes: PERRL. EOM intact. Conjunctivae are not pale. No scleral icterus.  ENT: Mucous membranes are moist. Oropharynx is clear and symmetric.    Neck: Supple. Full ROM. No lymphadenopathy.  Cardiovascular: Regular rate. Regular rhythm. No murmurs, rubs, or gallops. Distal pulses are 2+ and symmetric.  Pulmonary/Chest: No respiratory distress. Clear to auscultation bilaterally. No wheezing or rales.  Abdominal: Soft and non-distended.  There is no tenderness.  No rebound, guarding, or rigidity. Good bowel sounds.  Genitourinary: No CVA tenderness  Musculoskeletal: Moves all extremities. No obvious deformities. No edema. No calf tenderness.  Skin: Warm and dry.  Neurological: Patient is alert and oriented to person, month, and year, but not situation, place, or day of the week. Pupils ERRL and EOM normal. Cranial nerves II-XII are intact. Strength is full bilaterally; it is equal and 5/5 in bilateral upper and lower extremities. There is no pronator drift of outstretched arms. Light touch sense is intact. Speech is clear and normal. No acute focal neurological deficits noted.  Psychiatric: " "Normal affect. Good eye contact. Appropriate in content.     ED Course   Critical Care    Date/Time: 1/11/2023 9:44 PM  Performed by: Can Reagan MD  Authorized by: Can Reagan MD   Direct patient critical care time: 6 minutes  Additional history critical care time: 7 minutes  Ordering / reviewing critical care time: 8 minutes  Documentation critical care time: 5 minutes  Consulting other physicians critical care time: 4 minutes  Consult with family critical care time: 3 minutes  Other critical care time: 4 minutes  Total critical care time (exclusive of procedural time) : 37 minutes  Critical care time was exclusive of separately billable procedures and treating other patients and teaching time.  Critical care was necessary to treat or prevent imminent or life-threatening deterioration of the following conditions: CNS failure or compromise.  Critical care was time spent personally by me on the following activities: blood draw for specimens, development of treatment plan with patient or surrogate, discussions with consultants, interpretation of cardiac output measurements, evaluation of patient's response to treatment, examination of patient, obtaining history from patient or surrogate, ordering and performing treatments and interventions, ordering and review of laboratory studies, ordering and review of radiographic studies, pulse oximetry, re-evaluation of patient's condition and review of old charts.      ED Vital Signs:  Vitals:    01/11/23 1232 01/11/23 1416 01/11/23 1419 01/11/23 1432   BP: 132/66  (!) 165/73 (!) 152/59   Pulse: 80 80 64 63   Resp: 20  16 14   Temp: 97.8 °F (36.6 °C)      TempSrc: Oral      SpO2: 98%  98% 100%   Weight: 70.4 kg (155 lb 1.5 oz)      Height: 5' 7.5" (1.715 m)       01/11/23 2044   BP: 122/76   Pulse: 74   Resp: 20   Temp:    TempSrc:    SpO2: 96%   Weight:    Height:        Abnormal Lab Results:  Labs Reviewed   CBC W/ AUTO DIFFERENTIAL - Abnormal; Notable for the " following components:       Result Value    RBC 4.40 (*)     Hemoglobin 13.3 (*)     Mono # 1.3 (*)     Mono % 21.5 (*)     All other components within normal limits    Narrative:     Release to patient->Immediate   URINALYSIS, REFLEX TO URINE CULTURE - Abnormal; Notable for the following components:    Ketones, UA 2+ (*)     All other components within normal limits    Narrative:     Specimen Source->Urine   COMPREHENSIVE METABOLIC PANEL    Narrative:     Release to patient->Immediate   PROTIME-INR    Narrative:     Release to patient->Immediate   TSH    Narrative:     Release to patient->Immediate   HIV 1 / 2 ANTIBODY    Narrative:     Release to patient->Immediate   HEPATITIS C ANTIBODY    Narrative:     Release to patient->Immediate   HEP C VIRUS HOLD SPECIMEN    Narrative:     Release to patient->Immediate   HEMOGLOBIN A1C   HEMOGLOBIN A1C   SARS-COV-2 RDRP GENE    Narrative:     This test utilizes isothermal nucleic acid amplification technology to detect the SARS-CoV-2 RdRp nucleic acid segment. The analytical sensitivity (limit of detection) is 500 copies/swab.     A POSITIVE result is indicative of the presence of SARS-CoV-2 RNA; clinical correlation with patient history and other diagnostic information is necessary to determine patient infection status.    A NEGATIVE result means that SARS-CoV-2 nucleic acids are not present above the limit of detection. A NEGATIVE result should be treated as presumptive. It does not rule out the possibility of COVID-19 and should not be the sole basis for treatment decisions. If COVID-19 is strongly suspected based on clinical and exposure history, re-testing using an alternate molecular assay should be considered.     This test is only for use under the Food and Drug Administration s Emergency Use Authorization (EUA).     Commercial kits are provided by [employer]. Performance characteristics of the EUA have been independently verified by [facility]  Department of Pathology and Laboratory Medicine.   _________________________________________________________________   The authorized Fact Sheet for Healthcare Providers and the authorized Fact Sheet for Patients of the ID NOW COVID-19 are available on the FDA website:    https://www.fda.gov/media/375982/download      https://www.fda.gov/media/816958/download      POCT INFLUENZA A/B MOLECULAR   POCT GLUCOSE, HAND-HELD DEVICE   POCT GLUCOSE        All Lab Results:  Results for orders placed or performed during the hospital encounter of 01/11/23   CBC W/ AUTO DIFFERENTIAL   Result Value Ref Range    WBC 6.24 3.90 - 12.70 K/uL    RBC 4.40 (L) 4.60 - 6.20 M/uL    Hemoglobin 13.3 (L) 14.0 - 18.0 g/dL    Hematocrit 40.1 40.0 - 54.0 %    MCV 91 82 - 98 fL    MCH 30.2 27.0 - 31.0 pg    MCHC 33.2 32.0 - 36.0 g/dL    RDW 12.8 11.5 - 14.5 %    Platelets 237 150 - 450 K/uL    MPV 10.7 9.2 - 12.9 fL    Immature Granulocytes 0.3 0.0 - 0.5 %    Gran # (ANC) 3.5 1.8 - 7.7 K/uL    Immature Grans (Abs) 0.02 0.00 - 0.04 K/uL    Lymph # 1.1 1.0 - 4.8 K/uL    Mono # 1.3 (H) 0.3 - 1.0 K/uL    Eos # 0.3 0.0 - 0.5 K/uL    Baso # 0.04 0.00 - 0.20 K/uL    nRBC 0 0 /100 WBC    Gran % 55.3 38.0 - 73.0 %    Lymph % 18.3 18.0 - 48.0 %    Mono % 21.5 (H) 4.0 - 15.0 %    Eosinophil % 4.0 0.0 - 8.0 %    Basophil % 0.6 0.0 - 1.9 %    Differential Method Automated    Comprehensive metabolic panel   Result Value Ref Range    Sodium 141 136 - 145 mmol/L    Potassium 4.1 3.5 - 5.1 mmol/L    Chloride 106 95 - 110 mmol/L    CO2 25 23 - 29 mmol/L    Glucose 91 70 - 110 mg/dL    BUN 22 8 - 23 mg/dL    Creatinine 1.1 0.5 - 1.4 mg/dL    Calcium 9.4 8.7 - 10.5 mg/dL    Total Protein 6.7 6.0 - 8.4 g/dL    Albumin 3.5 3.5 - 5.2 g/dL    Total Bilirubin 0.4 0.1 - 1.0 mg/dL    Alkaline Phosphatase 66 55 - 135 U/L    AST 22 10 - 40 U/L    ALT 19 10 - 44 U/L    Anion Gap 10 8 - 16 mmol/L    eGFR >60 >60 mL/min/1.73 m^2   Protime-INR   Result Value Ref Range     Prothrombin Time 10.6 9.0 - 12.5 sec    INR 1.0 0.8 - 1.2   TSH   Result Value Ref Range    TSH 1.010 0.400 - 4.000 uIU/mL   HIV 1/2 Ag/Ab (4th Gen)   Result Value Ref Range    HIV 1/2 Ag/Ab Negative Negative   Hepatitis C Antibody   Result Value Ref Range    Hepatitis C Ab Negative Negative   HCV Virus Hold Specimen   Result Value Ref Range    HEP C Virus Hold Specimen Hold for HCV sendout    Urinalysis, Reflex to Urine Culture Urine, Clean Catch    Specimen: Urine   Result Value Ref Range    Specimen UA Urine, Clean Catch     Color, UA Yellow Yellow, Straw, Vicky    Appearance, UA Clear Clear    pH, UA 6.0 5.0 - 8.0    Specific Gravity, UA 1.020 1.005 - 1.030    Protein, UA Negative Negative    Glucose, UA Negative Negative    Ketones, UA 2+ (A) Negative    Bilirubin (UA) Negative Negative    Occult Blood UA Negative Negative    Nitrite, UA Negative Negative    Urobilinogen, UA Negative <2.0 EU/dL    Leukocytes, UA Negative Negative   POCT COVID-19 Rapid Screening   Result Value Ref Range    POC Rapid COVID Negative Negative     Acceptable Yes    POCT Influenza A/B Molecular   Result Value Ref Range    POC Molecular Influenza A Ag Negative Negative, Not Reported    POC Molecular Influenza B Ag Negative Negative, Not Reported     Acceptable Yes    POCT glucose   Result Value Ref Range    POCT Glucose 98 70 - 110 mg/dL         Imaging Results:  Imaging Results              US Carotid Bilateral (Final result)  Result time 01/11/23 19:49:02      Final result by Vero Sweeney MD (01/11/23 19:49:02)                   Impression:      No evidence of a hemodynamically significant carotid bifurcation stenosis.  No ICA stenosis greater than 50% identified      Electronically signed by: Vero Sweeney  Date:    01/11/2023  Time:    19:49               Narrative:    EXAMINATION:  US CAROTID BILATERAL    CLINICAL HISTORY:  acute CVA;    TECHNIQUE:  Grayscale and color Doppler ultrasound  examination of the carotid and vertebral artery systems bilaterally.  Stenosis estimates are per the NASCET measurement criteria.    COMPARISON:  None.    FINDINGS:  No velocity or waveform alteration identified to suggest hemodynamically significant stenosis in the carotid arteries bilateral.  Ratios are not elevated with the right 1.2 and the left 0.6.  Vertebral artery flow appears to be antegrade bilateral.                                       X-Ray Chest AP Portable (Final result)  Result time 01/11/23 19:25:49      Final result by Vero Sweeney MD (01/11/23 19:25:49)                   Impression:      No active finding      Electronically signed by: Vero Sweeney  Date:    01/11/2023  Time:    19:25               Narrative:    EXAMINATION:  XR CHEST AP PORTABLE    CLINICAL HISTORY:  acute CVA;    COMPARISON:  January 12, 2022    FINDINGS:  No new pulmonary consolidation or pleural effusion.  Mediastinal contour stable midline                                       MRI Brain Without Contrast (Final result)  Result time 01/11/23 16:46:49      Final result by Vero Sweeney MD (01/11/23 16:46:49)                   Impression:      Small focus of acute or subacute ischemia left basal ganglia/posterior limb internal capsule.  Findings discussed with ER nurse L[place] at the time of interpretation      Electronically signed by: Vero Sweeney  Date:    01/11/2023  Time:    16:46               Narrative:    EXAMINATION:  MRI BRAIN WITHOUT CONTRAST    CLINICAL HISTORY:  Neuro deficit, acute, stroke suspected;    TECHNIQUE:  Multisequence multiplanar imaging noncontrast    COMPARISON:  Head CT same date    FINDINGS:  There is a small focus of ischemia on diffusion correlating with ADC map measuring up to 14 mm left basal ganglia.  There is correlating T2 FLAIR hyperintense signal.  No sizable mass effect or hemorrhagic transformation.  Other chronic findings similar to CT given modality differences                                        CT Head Without Contrast (Final result)  Result time 01/11/23 12:58:34      Final result by Mart Daly MD (01/11/23 12:58:34)                   Impression:      Chronic microvascular ischemic changes. If there is additional clinical concern for acute infarct, MRI with diffusion weighted imaging recommended.  [place] stroke program early CT score (ASPECTS): 10.  Findings were discovered at 12:55 and conveyed by myself to Can Byrnes on 01/11/2023 at 12:57      Electronically signed by: Mart Daly MD  Date:    01/11/2023  Time:    12:58               Narrative:    EXAMINATION:  CT HEAD WITHOUT CONTRAST    CLINICAL HISTORY:  Neuro deficit, acute, stroke suspected;    TECHNIQUE:  Low dose axial CT images obtained throughout the head without intravenous contrast. Sagittal and coronal reconstructions were performed.  All CT scans at this facility use dose modulation, iterative reconstruction and/or weight based dosing when appropriate to reduce radiation dose to as low as reasonably achievable.    COMPARISON:  01/12/2022    FINDINGS:  Intracranial compartment:    The brain parenchyma demonstrates areas of decreased attenuation with mild to moderate periventricular white matter consistent with chronic microvascular ischemic changes.  Remote right-sided lacunar infarct.  No parenchymal mass, hemorrhage, edema or major vascular distribution infarct.  Vascular calcifications are noted.    Mild prominence of the sulci and ventricles are consistent with age-related involutional changes.    No extra-axial blood or fluid collections.    Skull/extracranial contents (limited evaluation): No fracture. Mastoid air cells and paranasal sinuses are essentially clear.                                       The EKG was ordered, reviewed, and independently interpreted by the ED provider.  Interpretation time: 14:07  Rate: 65 BPM  Rhythm: normal sinus rhythm with sinus  arrhythmia  Interpretation: Cannot rule out Inferior infarct, age undetermined. No STEMI.             The Emergency Provider reviewed the vital signs and test results, which are outlined above.     ED Discussion     5:14 PM: Discussed case with Dr. Castanon ([facility]). Dr. Castanon agrees with current care and management of pt and accepts admission.   Admitting Service: [facility]  Admitting Physician: Dr. Castanon  Admit to: Obs    5:15 PM: Re-evaluated pt. I have discussed test results, shared treatment plan, and the need for admission with patient and family at bedside. Pt and family express understanding at this time and agree with all information. All questions answered. Pt and family have no further questions or concerns at this time. Pt is ready for admit.      ED Course as of 01/11/23 2144 Wed Jan 11, 2023   1648 Patient with small ischemic infarct. He is not a candidate for TPA given prolonged symptoms prior to arrival.    [BA]      ED Course User Index  [BA] Can Reagan MD     Medical Decision Making:   Clinical Tests:   Lab Tests: Ordered and Reviewed  Radiological Study: Ordered and Reviewed  Medical Tests: Ordered and Reviewed         ED Medication(s):  Medications   amLODIPine tablet 5 mg (has no administration in time range)   donepeziL tablet 10 mg (10 mg Oral Not Given 1/11/23 2100)   memantine tablet 10 mg (10 mg Oral Not Given 1/11/23 2114)   multivitamin tablet (has no administration in time range)   sodium chloride 0.9% flush 10 mL (has no administration in time range)   sodium chloride 0.9% bolus 500 mL 500 mL (has no administration in time range)   enoxaparin injection 40 mg (40 mg Subcutaneous Given 1/11/23 1834)   hydrALAZINE injection 10 mg (has no administration in time range)   aspirin EC tablet 81 mg (has no administration in time range)   atorvastatin tablet 40 mg (has no administration in time range)   ondansetron injection 4 mg (has no administration in time range)    prochlorperazine injection Soln 5 mg (has no administration in time range)   senna-docusate 8.6-50 mg per tablet 1 tablet (1 tablet Oral Not Given 1/11/23 2114)   polyethylene glycol packet 17 g (has no administration in time range)   clopidogreL tablet 75 mg (75 mg Oral Given 1/11/23 1834)   QUEtiapine tablet 25 mg (25 mg Oral Not Given 1/11/23 2114)       New Prescriptions    No medications on file               Scribe Attestation:   Scribe #1: I performed the above scribed service and the documentation accurately describes the services I performed. I attest to the accuracy of the note.     Attending:   Physician Attestation Statement for Scribe #1: I, Can Reagan MD, personally performed the services described in this documentation, as scribed by Esther Castanon/Cherelle Bhatti, in my presence, and it is both accurate and complete.           Clinical Impression       ICD-10-CM ICD-9-CM   1. Gait abnormality  R26.9 781.2   2. Stroke  I63.9 434.91   3. Slurred speech  R47.81 784.59   4. Acute CVA (cerebrovascular accident)  I63.9 434.91       Disposition:   Disposition: Placed in Observation  Condition: Fair       Can Reagan MD  01/11/23 2143       Can Reagan MD  01/11/23 2144

## 2023-01-11 NOTE — FIRST PROVIDER EVALUATION
Medical screening examination initiated.  I have conducted a focused provider triage encounter, findings are as follows:    Brief history of present illness:  reports pt has trouble speaking yesterday. Reports his symptoms have improved. Call and spoke with his doctor who instructed to come to the er    There were no vitals filed for this visit.    Pertinent physical exam:  nad    Brief workup plan:  labs, imaging, ekg    Preliminary workup initiated; this workup will be continued and followed by the physician or advanced practice provider that is assigned to the patient when roomed.

## 2023-01-11 NOTE — TELEPHONE ENCOUNTER
----- Message from Tran Thorne sent at 1/11/2023  9:25 AM CST -----  Contact: [phone]  Patient wife  would like to consult with a nurse regarding her  symptoms. Please give her a call back at [phone]. Thanks cricket

## 2023-01-12 LAB
ALBUMIN SERPL BCP-MCNC: 3.3 G/DL (ref 3.5–5.2)
ALP SERPL-CCNC: 66 U/L (ref 55–135)
ALT SERPL W/O P-5'-P-CCNC: 19 U/L (ref 10–44)
ANION GAP SERPL CALC-SCNC: 10 MMOL/L (ref 8–16)
AORTIC ROOT ANNULUS: 3.57 CM
APTT BLDCRRT: 40.2 SEC (ref 21–32)
ASCENDING AORTA: 3.34 CM
AST SERPL-CCNC: 22 U/L (ref 10–40)
AV INDEX (PROSTH): 0.87
AV MEAN GRADIENT: 4 MMHG
AV PEAK GRADIENT: 8 MMHG
AV REGURGITATION PRESSURE HALF TIME: 876.02 MS
AV VALVE AREA: 2.62 CM2
AV VELOCITY RATIO: 0.78
BASOPHILS # BLD AUTO: 0.05 K/UL (ref 0–0.2)
BASOPHILS NFR BLD: 0.6 % (ref 0–1.9)
BILIRUB SERPL-MCNC: 0.4 MG/DL (ref 0.1–1)
BSA FOR ECHO PROCEDURE: 1.82 M2
BUN SERPL-MCNC: 19 MG/DL (ref 8–23)
CALCIUM SERPL-MCNC: 8.8 MG/DL (ref 8.7–10.5)
CHLORIDE SERPL-SCNC: 107 MMOL/L (ref 95–110)
CK MB SERPL-MCNC: 3.2 NG/ML (ref 0.1–6.5)
CK MB SERPL-RTO: 1.9 % (ref 0–5)
CK SERPL-CCNC: 170 U/L (ref 20–200)
CO2 SERPL-SCNC: 23 MMOL/L (ref 23–29)
CREAT SERPL-MCNC: 0.9 MG/DL (ref 0.5–1.4)
CV ECHO LV RWT: 0.56 CM
DIFFERENTIAL METHOD: ABNORMAL
DOP CALC AO PEAK VEL: 1.38 M/S
DOP CALC AO VTI: 27.5 CM
DOP CALC LVOT AREA: 3 CM2
DOP CALC LVOT DIAMETER: 1.96 CM
DOP CALC LVOT PEAK VEL: 1.08 M/S
DOP CALC LVOT STROKE VOLUME: 72.07 CM3
DOP CALC RVOT PEAK VEL: 0.76 M/S
DOP CALC RVOT VTI: 13.7 CM
DOP CALCLVOT PEAK VEL VTI: 23.9 CM
E WAVE DECELERATION TIME: 234.32 MSEC
E/A RATIO: 0.72
E/E' RATIO: 6.76 M/S
ECHO LV POSTERIOR WALL: 1.18 CM (ref 0.6–1.1)
EJECTION FRACTION: 65 %
EOSINOPHIL # BLD AUTO: 0.4 K/UL (ref 0–0.5)
EOSINOPHIL NFR BLD: 4.4 % (ref 0–8)
ERYTHROCYTE [DISTWIDTH] IN BLOOD BY AUTOMATED COUNT: 12.9 % (ref 11.5–14.5)
EST. GFR  (NO RACE VARIABLE): >60 ML/MIN/1.73 M^2
ESTIMATED AVG GLUCOSE: 108 MG/DL (ref 68–131)
FRACTIONAL SHORTENING: 37 % (ref 28–44)
GLUCOSE SERPL-MCNC: 89 MG/DL (ref 70–110)
HBA1C MFR BLD: 5.4 % (ref 4–5.6)
HCT VFR BLD AUTO: 40.3 % (ref 40–54)
HGB BLD-MCNC: 13.1 G/DL (ref 14–18)
IMM GRANULOCYTES # BLD AUTO: 0.03 K/UL (ref 0–0.04)
IMM GRANULOCYTES NFR BLD AUTO: 0.4 % (ref 0–0.5)
INR PPP: 1 (ref 0.8–1.2)
INTERVENTRICULAR SEPTUM: 1.41 CM (ref 0.6–1.1)
IVC DIAMETER: 1.53 CM
IVRT: 98.95 MSEC
LA MAJOR: 4.53 CM
LA MINOR: 4.63 CM
LA WIDTH: 3.8 CM
LEFT ATRIUM SIZE: 3.18 CM
LEFT ATRIUM VOLUME INDEX: 26 ML/M2
LEFT ATRIUM VOLUME: 47.04 CM3
LEFT INTERNAL DIMENSION IN SYSTOLE: 2.66 CM (ref 2.1–4)
LEFT VENTRICLE DIASTOLIC VOLUME INDEX: 43.9 ML/M2
LEFT VENTRICLE DIASTOLIC VOLUME: 79.45 ML
LEFT VENTRICLE MASS INDEX: 111 G/M2
LEFT VENTRICLE SYSTOLIC VOLUME INDEX: 14.3 ML/M2
LEFT VENTRICLE SYSTOLIC VOLUME: 25.92 ML
LEFT VENTRICULAR INTERNAL DIMENSION IN DIASTOLE: 4.22 CM (ref 3.5–6)
LEFT VENTRICULAR MASS: 200.84 G
LV LATERAL E/E' RATIO: 5.92 M/S
LV SEPTAL E/E' RATIO: 7.89 M/S
LVOT MG: 3.05 MMHG
LVOT MV: 0.85 CM/S
LYMPHOCYTES # BLD AUTO: 1.1 K/UL (ref 1–4.8)
LYMPHOCYTES NFR BLD: 14.5 % (ref 18–48)
MAGNESIUM SERPL-MCNC: 2 MG/DL (ref 1.6–2.6)
MCH RBC QN AUTO: 29.4 PG (ref 27–31)
MCHC RBC AUTO-ENTMCNC: 32.5 G/DL (ref 32–36)
MCV RBC AUTO: 90 FL (ref 82–98)
MONOCYTES # BLD AUTO: 1.6 K/UL (ref 0.3–1)
MONOCYTES NFR BLD: 20.1 % (ref 4–15)
MV PEAK A VEL: 0.99 M/S
MV PEAK E VEL: 0.71 M/S
MV STENOSIS PRESSURE HALF TIME: 67.95 MS
MV VALVE AREA P 1/2 METHOD: 3.24 CM2
NEUTROPHILS # BLD AUTO: 4.7 K/UL (ref 1.8–7.7)
NEUTROPHILS NFR BLD: 60 % (ref 38–73)
NRBC BLD-RTO: 0 /100 WBC
PHOSPHATE SERPL-MCNC: 3 MG/DL (ref 2.7–4.5)
PISA AR MAX VEL: 2.6 M/S
PISA MRMAX VEL: 3.04 M/S
PISA TR MAX VEL: 2.21 M/S
PLATELET # BLD AUTO: 210 K/UL (ref 150–450)
PMV BLD AUTO: 10.9 FL (ref 9.2–12.9)
POTASSIUM SERPL-SCNC: 4.1 MMOL/L (ref 3.5–5.1)
PROT SERPL-MCNC: 6.4 G/DL (ref 6–8.4)
PROTHROMBIN TIME: 10.9 SEC (ref 9–12.5)
PV MEAN GRADIENT: 1.2 MMHG
RA MAJOR: 4.14 CM
RA PRESSURE: 3 MMHG
RA WIDTH: 3.3 CM
RBC # BLD AUTO: 4.46 M/UL (ref 4.6–6.2)
SODIUM SERPL-SCNC: 140 MMOL/L (ref 136–145)
STJ: 3.46 CM
TDI LATERAL: 0.12 M/S
TDI SEPTAL: 0.09 M/S
TDI: 0.11 M/S
TR MAX PG: 20 MMHG
TR MEAN GRADIENT: 15 MMHG
TRICUSPID ANNULAR PLANE SYSTOLIC EXCURSION: 1.8 CM
TROPONIN I SERPL DL<=0.01 NG/ML-MCNC: <0.006 NG/ML (ref 0–0.03)
TV REST PULMONARY ARTERY PRESSURE: 23 MMHG
WBC # BLD AUTO: 7.88 K/UL (ref 3.9–12.7)

## 2023-01-12 PROCEDURE — 85730 THROMBOPLASTIN TIME PARTIAL: CPT | Performed by: NURSE PRACTITIONER

## 2023-01-12 PROCEDURE — 25000003 PHARM REV CODE 250: Performed by: NURSE PRACTITIONER

## 2023-01-12 PROCEDURE — 11000001 HC ACUTE MED/SURG PRIVATE ROOM

## 2023-01-12 PROCEDURE — 36415 COLL VENOUS BLD VENIPUNCTURE: CPT | Performed by: NURSE PRACTITIONER

## 2023-01-12 PROCEDURE — 97162 PT EVAL MOD COMPLEX 30 MIN: CPT

## 2023-01-12 PROCEDURE — 84100 ASSAY OF PHOSPHORUS: CPT | Performed by: NURSE PRACTITIONER

## 2023-01-12 PROCEDURE — 94761 N-INVAS EAR/PLS OXIMETRY MLT: CPT

## 2023-01-12 PROCEDURE — 85025 COMPLETE CBC W/AUTO DIFF WBC: CPT | Performed by: NURSE PRACTITIONER

## 2023-01-12 PROCEDURE — 83735 ASSAY OF MAGNESIUM: CPT | Performed by: NURSE PRACTITIONER

## 2023-01-12 PROCEDURE — 92610 EVALUATE SWALLOWING FUNCTION: CPT

## 2023-01-12 PROCEDURE — 99223 1ST HOSP IP/OBS HIGH 75: CPT | Mod: ,,, | Performed by: PSYCHIATRY & NEUROLOGY

## 2023-01-12 PROCEDURE — 85610 PROTHROMBIN TIME: CPT | Performed by: NURSE PRACTITIONER

## 2023-01-12 PROCEDURE — 99223 PR INITIAL HOSPITAL CARE,LEVL III: ICD-10-PCS | Mod: ,,, | Performed by: PSYCHIATRY & NEUROLOGY

## 2023-01-12 PROCEDURE — 82553 CREATINE MB FRACTION: CPT | Performed by: NURSE PRACTITIONER

## 2023-01-12 PROCEDURE — 63600175 PHARM REV CODE 636 W HCPCS: Performed by: NURSE PRACTITIONER

## 2023-01-12 PROCEDURE — 21400001 HC TELEMETRY ROOM

## 2023-01-12 PROCEDURE — 92523 SPEECH SOUND LANG COMPREHEN: CPT

## 2023-01-12 PROCEDURE — 80053 COMPREHEN METABOLIC PANEL: CPT | Performed by: NURSE PRACTITIONER

## 2023-01-12 PROCEDURE — 97530 THERAPEUTIC ACTIVITIES: CPT

## 2023-01-12 PROCEDURE — 84484 ASSAY OF TROPONIN QUANT: CPT | Performed by: NURSE PRACTITIONER

## 2023-01-12 PROCEDURE — 97165 OT EVAL LOW COMPLEX 30 MIN: CPT

## 2023-01-12 RX ORDER — BENZONATATE 100 MG/1
100 CAPSULE ORAL 3 TIMES DAILY PRN
Status: DISCONTINUED | OUTPATIENT
Start: 2023-01-12 | End: 2023-01-13 | Stop reason: HOSPADM

## 2023-01-12 RX ADMIN — SENNOSIDES AND DOCUSATE SODIUM 1 TABLET: 8.6; 5 TABLET ORAL at 09:01

## 2023-01-12 RX ADMIN — DONEPEZIL HYDROCHLORIDE 10 MG: 5 TABLET, FILM COATED ORAL at 08:01

## 2023-01-12 RX ADMIN — AMLODIPINE BESYLATE 5 MG: 5 TABLET ORAL at 09:01

## 2023-01-12 RX ADMIN — ENOXAPARIN SODIUM 40 MG: 40 INJECTION SUBCUTANEOUS at 05:01

## 2023-01-12 RX ADMIN — MEMANTINE HYDROCHLORIDE 10 MG: 5 TABLET ORAL at 09:01

## 2023-01-12 RX ADMIN — MEMANTINE HYDROCHLORIDE 10 MG: 5 TABLET ORAL at 08:01

## 2023-01-12 RX ADMIN — ASPIRIN 81 MG: 81 TABLET, COATED ORAL at 09:01

## 2023-01-12 RX ADMIN — POLYETHYLENE GLYCOL 3350 17 G: 17 POWDER, FOR SOLUTION ORAL at 09:01

## 2023-01-12 RX ADMIN — ATORVASTATIN CALCIUM 40 MG: 40 TABLET, FILM COATED ORAL at 09:01

## 2023-01-12 RX ADMIN — QUETIAPINE FUMARATE 25 MG: 25 TABLET ORAL at 08:01

## 2023-01-12 RX ADMIN — CLOPIDOGREL BISULFATE 75 MG: 75 TABLET ORAL at 09:01

## 2023-01-12 RX ADMIN — SENNOSIDES AND DOCUSATE SODIUM 1 TABLET: 8.6; 5 TABLET ORAL at 08:01

## 2023-01-12 RX ADMIN — THERA TABS 1 TABLET: TAB at 09:01

## 2023-01-12 NOTE — PT/OT/SLP EVAL
Occupational Therapy Evaluation and Treatment    Name: Pravin Ferrari  MRN: [id number]  Admitting Diagnosis: Acute CVA (cerebrovascular accident)  Recent Surgery: * No surgery found *      Recommendations:     Discharge Recommendations: outpatient OT  Level of Assistance Recommended: Intermittent assistance   Discharge Equipment Recommendations: walker, rolling  Barriers to discharge:      Assessment:     Pravin Ferrari is a 82 y.o. male with a medical diagnosis of Acute CVA (cerebrovascular accident). He presents with performance deficits affecting function including weakness, impaired functional mobility, decreased safety awareness, impaired endurance, gait instability, impaired balance, impaired self care skills, decreased lower extremity function.     Rehab Prognosis: Good; patient would benefit from acute skilled OT services to address these deficits and reach maximum level of function.    Plan:     Patient to be seen 2 x/week to address the above listed problems via self-care/home management, therapeutic activities, therapeutic exercises  Plan of Care Expires:    Plan of Care Reviewed with: patient    Subjective     Chief Complaint: DEBILITY AND GENERALIZED WEAKNESS  Patient Comments/Goals:   Pain/Comfort:  Pain Rating 1: 0/10    Patients cultural, spiritual, Amish conflicts given the current situation:      Social History:  Living Environment: Patient lives with their family in a single story home, number of outside stairs: 0 .  Prior Level of Function: Prior to admission, patient was independent with ADLs.  Roles and Routines: OCCUPATIONAL THERAPY  Equipment Used at Home:  none  DME owned (not currently used): none  Assistance Upon Discharge: family.    Objective:     Communicated with NURSE AND Epic CHART REVIEW prior to session. Patient found HOB elevated with peripheral IV, telemetry upon OT entry to room.    General Precautions: Standard, fall   Orthopedic Precautions:N/A   Braces: N/A  Respiratory  Status: Room air    Occupational Performance    Bed Mobility:  Rolling/Turning to Left with stand by assistance  Scooting anteriorly to EOB to have both feet planted on floor: stand by assistance  Supine to sit from left side of bed with stand by assistance    Functional Mobility/Transfers:  Sit <> Stand Transfer with contact guard assistance with rolling walker  Functional Mobility: PT AMBULATED 50 FEET X 1 WITH MIN A AND 50 FEET X 1 (2 ND TRAIL) CGA WITH RW    Activities of Daily Living:  Lower Body Dressing: stand by assistance .    Cognitive/Visual Perceptual:  Cognitive/Psychosocial Skills:     -       Oriented to: Person, Place, Time, and Situation   -       Follows Commands/attention:Follows two-step commands  -       Communication: clear/fluent  -       Memory: Impaired STM and Impaired LTM  -       Safety awareness/insight to disability: impaired     Physical Exam:  Upper Extremity Range of Motion:  -       Right Upper Extremity: WFL  -       Left Upper Extremity: WFL  Upper Extremity Strength:    -       Right Upper Extremity: MMT: 3/5 GROSSLY  -       Left Upper Extremity: MMT: 3/5 GROSSLY   Strength:    -       Right Upper Extremity: MMT: 3/5 GROSSLY  -       Left Upper Extremity: MMT: 3/5 GROSSLY    AMPAC 6 Click ADL:  AMPAC Total Score: 20    Treatment & Education:  Therapist provided facilitation and instruction of proper body mechanics, energy conservation, and fall prevention strategies during tasks listed above.  Patient educated on role of OT, POC and goals for therapy  Patient educated on importance of OOB activities with staff member assistance and sitting OOB majority of the day.   Patient RECOMMEND TO AMBULATE WITH STAFF    Patient left sitting edge of bed with all lines intact, call button in reach, and RN notified.    GOALS:   Multidisciplinary Problems       Occupational Therapy Goals          Problem: Occupational Therapy    Goal Priority Disciplines Outcome Interventions    Occupational Therapy Goal     OT, PT/OT     Description: O.T. GOALS TO BE MET BY 1-26-23  MOD (I) WITH LE DRESSING  MOD (I) WITH TOILET TRANSFERS  PT WILL TOLERATE 1 SET XC 15 REPS B UE ROM EXERCISE                       History:     Past Medical History:   Diagnosis Date    Acute CVA (cerebrovascular accident) 1/11/2023    Cancer     prostate    [place] (hard of hearing)     bilateral ears    Hypertension     pt states no         Past Surgical History:   Procedure Laterality Date    COLONOSCOPY N/A 3/19/2019    Procedure: COLONOSCOPY;  Surgeon: Korin Cavazos MD;  Location: [facility];  Service: Endoscopy;  Laterality: N/A;    EYE SURGERY      HERNIA REPAIR      PROSTATE SURGERY         Time Tracking:     OT Date of Treatment: 01/12/23  OT Start Time: 0956  OT Stop Time: 1022  OT Total Time (min): 26 min    Billable Minutes: Evaluation 15 MINUTES and Therapeutic Activity 11 MINUTES    1/12/2023

## 2023-01-12 NOTE — CONSULTS
Consultation Requested by: Medicine   Chief Complaint: CVA    Service used: NO    HPI: Pravin Ferrari is a 82 y.o. male with past medical history of prostate cancer, hypertension and mild dementia who presented to the ED with word-finding difficulty, dysarthria unsteady gait and right leg weakness and numbness.  Last known normal day before yesterday.  Symptoms worsened yesterday.  Denies having such symptoms in the past.  Denies any aggravating or relieving factors.  He is not on anticoagulation.  Denies having stroke in the past.  MRI showed left basal ganglia/posterior limb internal capsule.  When I saw him this morning he was comfortably lying in the bed.  He reported improvement in his symptoms.  Per family he is not back to baseline but definitely symptoms are improved.  Denies any dizziness, vision problem, or loss of consciousness.     Past Medical History:   Diagnosis Date    Acute CVA (cerebrovascular accident) 1/11/2023    Cancer     prostate    [place] (hard of hearing)     bilateral ears    Hypertension     pt states no     Past Surgical History:   Procedure Laterality Date    COLONOSCOPY N/A 3/19/2019    Procedure: COLONOSCOPY;  Surgeon: Korin Cavazos MD;  Location: [facility];  Service: Endoscopy;  Laterality: N/A;    EYE SURGERY      HERNIA REPAIR      PROSTATE SURGERY       Review of patient's allergies indicates:   Allergen Reactions    Lisinopril Swelling       Current Facility-Administered Medications:     amLODIPine tablet 5 mg, 5 mg, Oral, Daily, Chary Mason NP, 5 mg at 01/12/23 0932    aspirin EC tablet 81 mg, 81 mg, Oral, Daily, Chary Mason NP, 81 mg at 01/12/23 0932    atorvastatin tablet 40 mg, 40 mg, Oral, Daily, Chary Mason NP, 40 mg at 01/12/23 0932    clopidogreL tablet 75 mg, 75 mg, Oral, Daily, Chary Mason NP, 75 mg at 01/12/23 0932    donepeziL tablet 10 mg, 10 mg, Oral, Nightly, Chary Mason NP    enoxaparin injection 40 mg, 40 mg, Subcutaneous,  "Daily, Chary Mason NP, 40 mg at 01/11/23 1834    hydrALAZINE injection 10 mg, 10 mg, Intravenous, Q6H PRN, Chary Mason NP    memantine tablet 10 mg, 10 mg, Oral, BID, Chary Mason NP, 10 mg at 01/12/23 0932    multivitamin tablet, 1 tablet, Oral, Daily, Chary Mason NP, 1 tablet at 01/12/23 0932    ondansetron injection 4 mg, 4 mg, Intravenous, Q6H PRN, Chary Mason NP    polyethylene glycol packet 17 g, 17 g, Oral, Daily, Chary Mason NP, 17 g at 01/12/23 0932    prochlorperazine injection Soln 5 mg, 5 mg, Intravenous, Q6H PRN, Chary Mason NP    QUEtiapine tablet 25 mg, 25 mg, Oral, QHS, Chary Mason NP    senna-docusate 8.6-50 mg per tablet 1 tablet, 1 tablet, Oral, BID, Chary Mason NP, 1 tablet at 01/12/23 0932    sodium chloride 0.9% bolus 500 mL 500 mL, 500 mL, Intravenous, Continuous PRN, Chary Mason NP    sodium chloride 0.9% flush 10 mL, 10 mL, Intravenous, PRN, Chary Mason NP    Social History     Socioeconomic History    Marital status:    Tobacco Use    Smoking status: Never    Smokeless tobacco: Never   Substance and Sexual Activity    Alcohol use: Yes     Comment: drinks one beer every "once in a while during summer"    Drug use: No     Family History   Problem Relation Age of Onset    No Known Problems Mother     Aneurysm Father     Cerebral aneurysm Father     Prostate cancer Brother     Prostate cancer Son      Review of Systems  Constitutional: no fevers, no weight changes,  No diaphoresis  HEENT: No congestion, no doublevision, no dysphagia, no rhinnorhea, no lacrimation  Cardiovascular: no chest pain or palpitations  Respiratory: no shortness of breath, no cough  Gastrointestinal: no diarrhea, no constipation  Genitourinary: no dysuria  Musculoskeletal: no joint swelling. No myalgia  Skin: no rash  Psychiatric: no hallucinations, no depression or anxiety  Neurologic: as per HPI  All other review of systems is negative and as per " HPI.    Vitals:    01/12/23 1327   BP:    Pulse: 82   Resp:    Temp:       Exam  General: Pleasant, conversant, Well-kempt  HEENT: Head atraumatic. No nasal abnormality. No gaze preference. EOMI.  Cardiovascular: Regular rhythm   Lungs: Not in distress   Mental Status: Awake alert and oriented to place and person but not to time which is baseline. Follows commands. Mild aphasia and moderate dysarthria.   Cranial Nerve: PERRL. VFF. EOMI. Facial sensation intact. No facial asymmetry. Hearing intact. Palate elevates. Uvula midline. SCM strong. No tongue deviation.  Motor: Normal tone. No cogwheel rigidity. No bradykinesia. No pronator drift. Left side 5/5.RUE 5/5 and RLE 4+/5   Deep Tendon Reflexes: 2+ in biceps, tricepts, brachioradialis b/l. 2+ in patellar and ankle jerks.   Sensory: Intact to soft touch, cold. No neglect.  Cerebellar: Finger to nose intact. Heel to shin intact.  Gait:  Deferred in the setting of acute stroke     Other tests:    HbA1c:5.4  LDL: 111    Carotid US:  No evidence of a hemodynamically significant carotid bifurcation stenosis.  No ICA stenosis greater than 50% identified     ECHO:  The left ventricle is normal in size with concentric hypertrophy and normal systolic function.  The estimated ejection fraction is 65%.  Grade I left ventricular diastolic dysfunction.  Normal right ventricular size with normal right ventricular systolic function.  Mild tricuspid regurgitation.  Mild mitral regurgitation.  Mild aortic regurgitation.  The estimated PA systolic pressure is 23 mmHg.    MRI Brain:  Small focus of acute or subacute ischemia left basal ganglia/posterior limb internal capsule.       Assessment:    Pravin Ferrari is a 82 y.o. male with past medical history of prostate cancer, hypertension and mild dementia who presented to the ED with word-finding difficulty, dysarthria unsteady gait and right leg weakness and numbness.NIHSS 3. MRI showed small focus of acute or subacute ischemia left  basal ganglia/posterior limb internal capsule. Not a tPA or IR candidate.       Stroke etiology: Small vessel disease    Pre stroke mRS: 0    Problem List:  - Acute left MCA infarct   - HTN   - HL  - Dementia     Plan:    -- Inpatient  -- Initiate Stroke orderset  -- Place patient on telemetry  -- Activity as tolerated  -- Normoglycemia  -- Normothermia  -- BP goal normotensive   -- Do not reduce BP more then 10-15% per day  -- ASA 81 mg daily PO or  mg per rectum daily if NPO  -- DVT prophylaxis  -- Plavix 75 mg daily for 21 days   -- Atorvastatin 40 mg daily   -- RN swallow screen to be completed by the RN prior to any PO intake. If the patient fails the RN swallow screen then make patient NPO. A formal clinical swallow evaluation by SLP is needed.  -- Follow speech recommendations for starting diet  -- Start 0.9% NS @  cc/ hr unless contraindicated   -- Discontinue IV fluids when the patient becomes stable, is taking fluids by mouth, and is not required for BP management   -- Physical therapy  -- Occupational therapy  -- Speech therapy  -- Rehab consult  -- We had an in depth discussion with patient and family regarding the imaging findings, stroke etiology and our plan    -- Case and plan discussed with primary team    -- We discussed her vascular risk factors and the need for her to continue to modify her risks e.g. taking her mediations, proper diet, and exercise.  -- Work with primary care provider on stroke risk factor management, BP <140/90, cholesterol monitoring  -- We discussed the need for her to continue to exercise her body and her mind with physical activity and mental activities.  -- We discussed BEFAST and the need for her to recognize stroke symptoms and report to the ER early is needed.    B-Balance  E-Eyes, vision  F-Facial Droop  A-Arm or leg weakness on one side  S-Speech trouble  T-Time - CALL 911!    -- Please have this patient follow up in stroke clinic in 2-3 months  -- Thank  you for involving us in the care of the patient.  Please do not hesitate to contact us if there is any question or concern.  We will sign off.      Dennis Meredith MD  Neurology   [facility]

## 2023-01-12 NOTE — PT/OT/SLP EVAL
"Speech Language Pathology Evaluation  Cognitive/Bedside Swallow    Patient Name:  Pravin Ferrari   MRN:  [id number]  Admitting Diagnosis: Acute CVA (cerebrovascular accident)    Recommendations:                  General Recommendations:  Dysphagia therapy, Speech/language therapy, and Cognitive-linguistic therapy  Diet recommendations:  Regular Diet - IDDSI Level 7, Thin liquids - IDDSI Level 0   Aspiration Precautions: Standard aspiration precautions   General Precautions: Standard, aphasia  Communication strategies:  provide increased time to answer    History:     Past Medical History:   Diagnosis Date    Acute CVA (cerebrovascular accident) 1/11/2023    Cancer     prostate    [place] (hard of hearing)     bilateral ears    Hypertension     pt states no       Past Surgical History:   Procedure Laterality Date    COLONOSCOPY N/A 3/19/2019    Procedure: COLONOSCOPY;  Surgeon: Korin Cavazos MD;  Location: [facility];  Service: Endoscopy;  Laterality: N/A;    EYE SURGERY      HERNIA REPAIR      PROSTATE SURGERY           Subjective     Patient seen at bedside with family present. He denied any pain.     Patient goals: To go home.      Pain/Comfort:  Pain Rating 1: 0/10  Pain Rating Post-Intervention 1: 0/10  Pain Rating 2: 0/10  Pain Rating Post-Intervention 2: 0/10    Respiratory Status: Room air    Objective:     Western Aphasia Battery - Revised Bedside Record Form   The Western Aphasia Battery -R (WAB-R) Bedside record form is a tool used to identify language impairment or baseline language functioning when time constraints prevent the administration of the full test. According to the Western Aphasia Battery-R manual, "Interpretation of the sections and tasks are consistent with the full test."     The WAB-R bedside record form was utilized today to quickly assess language function. See below for results:     SECTION SCORE   Spontaneous Speech: Content 9/10   Spontaneous Speech: Fluency 8/10   Auditory Verbal " Comprehension: Yes/No Questions 10/10   Sequential Commands 9/10   Repetition 1010   Object Naming 9/10   Reading 8/10       The patient demonstrated speech that was fluent with paraphasias and some word finding difficulty. Auditory comprehension remains relatively intact.     Motor speech:   Informal assessment of motor speech was completed due to time constraints. Patient presents with low volume, fast rate of speech, slurred speech, and imprecise consonants. 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    Oral Musculature Evaluation  Oral Musculature: general weakness  Dentition: present and adequate  Secretion Management: left corner drooling  Mucosal Quality: good  Mandibular Strength and Mobility: impaired  Oral Labial Strength and Mobility: impaired coordination  Lingual Strength and Mobility: impaired strength  Velar Elevation: WNL  Buccal Strength and Mobility: impaired  Volitional Cough: Present/productive  Volitional Swallow: Present  Voice Prior to PO Intake: WFL  Oral Musculature Comments: Generalized weakness/decreased coordination    Bedside Swallow Eval:   Consistencies Assessed:  Thin liquids   Soft solids    Solids      Descriptions:   Patient presents with oral phase of the swallow characterized by decreased lingual and labial strength, ROM, and coordination resulting in labial escape of thin liquids and minimal-moderate oral residue following soft solids. Oral residue was reduced with a liquid wash. No overt s/s of aspiration observed during today's assessment.       Assessment:     Pravin Ferrari is a 82 y.o. male with an SLP diagnosis of Aphasia, Dysphagia, and Dysarthria.  He presents with dysarthria characterized by low volume, fast rate of speech, slurred speech, and imprecise consonants. He presents with expressive language deficits characterized by paraphasias and word finding difficulty. He presents with oral  dysphagia characterized by decreased lingual and labial strength, ROM, and coordination. He will benefit from continued ST intervention to address the above deficits. Outpatient speech therapy is recommended following discharge.     Goals:   Multidisciplinary Problems       SLP Goals          Problem: SLP    Goal Priority Disciplines Outcome   SLP Goal     SLP                        Plan:     Patient to be seen:  2 x/week, 3 x/week   Plan of Care expires:  01/19/23  Plan of Care reviewed with:  patient, spouse, family   SLP Follow-Up:  Yes       Discharge recommendations:  Discharge Facility/Level of Care Needs: outpatient speech therapy   Barriers to Discharge:  None    Time Tracking:     SLP Treatment Date:   01/12/23  Speech Start Time:  1020  Speech Stop Time:  1050     Speech Total Time (min):  30 min    Billable Minutes: Eval 15  and Eval Swallow and Oral Function 15    01/12/2023

## 2023-01-12 NOTE — H&P
[facility] - Emergency Dept.  Hospital Medicine  History & Physical    Patient Name: Pravin Ferrari  MRN: [id number]  Patient Class: OP- Observation  Admission Date: 1/11/2023  Attending Physician: Dr. Castanon   Primary Care Provider: José Miguel Harrington MD         Patient information was obtained from patient and ER records.     Subjective:     Principal Problem:Acute CVA (cerebrovascular accident)    Chief Complaint:   Chief Complaint   Patient presents with    Gait Problem     Unsteady gait and slurred speech since yesterday        HPI: The patient is a 81 yo male with Prostate cancer, HTN, and mild Dementia who presented to ED after family noticed word finding difficulty, mild dysarthria, unsteady gait, transient right leg weakness/numbness onset yesterday that became more pronounced today.  In the ED, VSS, Labs unremarkable. UA negative. CT head showed chronic microvascular ischemic changes, nothing acute. MRI brain showed acute/subacute basal gangila stroke. CXR pending   EKG showed NSR, nonspecific Twave abnormality     The patient will be placed in observation under hospital medicine       Past Medical History:   Diagnosis Date    Acute CVA (cerebrovascular accident) 1/11/2023    Cancer     prostate    [place] (hard of hearing)     bilateral ears    Hypertension     pt states no       Past Surgical History:   Procedure Laterality Date    COLONOSCOPY N/A 3/19/2019    Procedure: COLONOSCOPY;  Surgeon: Korin Cavazos MD;  Location: [facility];  Service: Endoscopy;  Laterality: N/A;    EYE SURGERY      HERNIA REPAIR      PROSTATE SURGERY         Review of patient's allergies indicates:   Allergen Reactions    Lisinopril Swelling       No current facility-administered medications on file prior to encounter.     Current Outpatient Medications on File Prior to Encounter   Medication Sig    amLODIPine (NORVASC) 5 MG tablet Take 1 tablet (5 mg total) by mouth once daily.    aspirin (ECOTRIN) 81 MG EC tablet Take 81  "mg by mouth once daily.    donepeziL (ARICEPT) 10 MG tablet Take 10 mg by mouth nightly.    memantine (NAMENDA) 10 MG Tab Take 10 mg by mouth 2 (two) times a day.    multivitamin (THERAGRAN) per tablet Take 1 tablet by mouth once daily.    [DISCONTINUED] hydroCHLOROthiazide (HYDRODIURIL) 25 MG tablet Take 1 tablet (25 mg total) by mouth once daily.     Family History       Problem Relation (Age of Onset)    Aneurysm Father    Cerebral aneurysm Father    No Known Problems Mother    Prostate cancer Brother, Son          Tobacco Use    Smoking status: Never    Smokeless tobacco: Never   Substance and Sexual Activity    Alcohol use: Yes     Comment: drinks one beer every "once in a while during summer"    Drug use: No    Sexual activity: Not on file     Review of Systems   Constitutional:  Positive for activity change. Negative for appetite change, chills, diaphoresis, fatigue and fever.   HENT:  Negative for congestion, nosebleeds, sore throat and trouble swallowing.    Eyes:  Negative for pain, discharge and visual disturbance.   Respiratory:  Negative for apnea, cough, chest tightness, shortness of breath, wheezing and stridor.    Cardiovascular:  Negative for chest pain, palpitations and leg swelling.   Gastrointestinal:  Negative for abdominal distention, abdominal pain, blood in stool, constipation, diarrhea, nausea and vomiting.   Endocrine: Negative for cold intolerance and heat intolerance.   Genitourinary:  Negative for difficulty urinating, dysuria, flank pain, frequency and urgency.   Musculoskeletal:  Positive for gait problem. Negative for arthralgias, back pain, joint swelling, myalgias, neck pain and neck stiffness.   Skin:  Negative for rash and wound.   Allergic/Immunologic: Negative for food allergies and immunocompromised state.   Neurological:  Positive for facial asymmetry, speech difficulty, weakness and numbness. Negative for dizziness, seizures, syncope, light-headedness and headaches. "   Hematological:  Negative for adenopathy.   Psychiatric/Behavioral:  Negative for agitation, behavioral problems and confusion. The patient is not nervous/anxious.    Objective:     Vital Signs (Most Recent):  Temp: 97.8 °F (36.6 °C) (01/11/23 1232)  Pulse: 63 (01/11/23 1432)  Resp: 14 (01/11/23 1432)  BP: (!) 152/59 (01/11/23 1432)  SpO2: 100 % (01/11/23 1432)   Vital Signs (24h Range):  Temp:  [97.8 °F (36.6 °C)] 97.8 °F (36.6 °C)  Pulse:  [63-80] 63  Resp:  [14-20] 14  SpO2:  [98 %-100 %] 100 %  BP: (132-165)/(59-73) 152/59     Weight: 70.4 kg (155 lb 1.5 oz)  Body mass index is 23.93 kg/m².    Physical Exam  Vitals and nursing note reviewed.   Constitutional:       Appearance: He is well-developed.   HENT:      Head: Normocephalic and atraumatic.      Nose: Nose normal.   Eyes:      General: No scleral icterus.  Cardiovascular:      Rate and Rhythm: Normal rate and regular rhythm.      Heart sounds: Normal heart sounds. No murmur heard.    No friction rub. No gallop.   Pulmonary:      Effort: Pulmonary effort is normal. No respiratory distress.      Breath sounds: Normal breath sounds. No wheezing.   Abdominal:      General: Bowel sounds are normal. There is no distension.      Palpations: Abdomen is soft.      Tenderness: There is no abdominal tenderness.   Musculoskeletal:         General: Normal range of motion.      Cervical back: Normal range of motion and neck supple.   Skin:     General: Skin is warm and dry.      Findings: No rash.   Neurological:      Mental Status: He is alert and oriented to person, place, and time.      Comments: Mild right facial droop  Mild dysarthria noted   Unsteady gait   No other obvious focal deficits/weakness    Psychiatric:         Behavior: Behavior normal.         Cognition and Memory: Cognition is impaired.           Significant Labs: All pertinent labs within the past 24 hours have been reviewed.    Significant Imaging: I have reviewed all pertinent imaging  results/findings within the past 24 hours.    Assessment/Plan:     * Acute CVA (cerebrovascular accident)  Cont ASA  Start Lipitor, Plavix   Check Carotid U/S and Echo  Recent FLP 12/2022 noted   Check Hgb A1c   PT/OT/ST   Neurology consult   Permissive HTN tonight- restart home medication Amlodipine in am       Dementia  Mild cognitive impairment   Cont home meds Aricept and Namenda   Supportive care       Essential hypertension  Permissive HTN tonight- restart home medication Amlodipine in am           VTE Risk Mitigation (From admission, onward)         Ordered     enoxaparin injection 40 mg  Daily         01/11/23 1807     IP VTE HIGH RISK PATIENT  Once         01/11/23 1807     Place sequential compression device  Until discontinued         01/11/23 1807                   Chary Mason NP  Department of Hospital Medicine   '[place] - Emergency Dept.

## 2023-01-12 NOTE — SUBJECTIVE & OBJECTIVE
"Past Medical History:   Diagnosis Date    Acute CVA (cerebrovascular accident) 1/11/2023    Cancer     prostate    [place] (hard of hearing)     bilateral ears    Hypertension     pt states no       Past Surgical History:   Procedure Laterality Date    COLONOSCOPY N/A 3/19/2019    Procedure: COLONOSCOPY;  Surgeon: Korin Cavazos MD;  Location: [facility];  Service: Endoscopy;  Laterality: N/A;    EYE SURGERY      HERNIA REPAIR      PROSTATE SURGERY         Review of patient's allergies indicates:   Allergen Reactions    Lisinopril Swelling       No current facility-administered medications on file prior to encounter.     Current Outpatient Medications on File Prior to Encounter   Medication Sig    amLODIPine (NORVASC) 5 MG tablet Take 1 tablet (5 mg total) by mouth once daily.    aspirin (ECOTRIN) 81 MG EC tablet Take 81 mg by mouth once daily.    donepeziL (ARICEPT) 10 MG tablet Take 10 mg by mouth nightly.    memantine (NAMENDA) 10 MG Tab Take 10 mg by mouth 2 (two) times a day.    multivitamin (THERAGRAN) per tablet Take 1 tablet by mouth once daily.    [DISCONTINUED] hydroCHLOROthiazide (HYDRODIURIL) 25 MG tablet Take 1 tablet (25 mg total) by mouth once daily.     Family History       Problem Relation (Age of Onset)    Aneurysm Father    Cerebral aneurysm Father    No Known Problems Mother    Prostate cancer Brother, Son          Tobacco Use    Smoking status: Never    Smokeless tobacco: Never   Substance and Sexual Activity    Alcohol use: Yes     Comment: drinks one beer every "once in a while during summer"    Drug use: No    Sexual activity: Not on file     Review of Systems   Constitutional:  Positive for activity change. Negative for appetite change, chills, diaphoresis, fatigue and fever.   HENT:  Negative for congestion, nosebleeds, sore throat and trouble swallowing.    Eyes:  Negative for pain, discharge and visual disturbance.   Respiratory:  Negative for apnea, cough, chest tightness, shortness of " breath, wheezing and stridor.    Cardiovascular:  Negative for chest pain, palpitations and leg swelling.   Gastrointestinal:  Negative for abdominal distention, abdominal pain, blood in stool, constipation, diarrhea, nausea and vomiting.   Endocrine: Negative for cold intolerance and heat intolerance.   Genitourinary:  Negative for difficulty urinating, dysuria, flank pain, frequency and urgency.   Musculoskeletal:  Positive for gait problem. Negative for arthralgias, back pain, joint swelling, myalgias, neck pain and neck stiffness.   Skin:  Negative for rash and wound.   Allergic/Immunologic: Negative for food allergies and immunocompromised state.   Neurological:  Positive for facial asymmetry, speech difficulty, weakness and numbness. Negative for dizziness, seizures, syncope, light-headedness and headaches.   Hematological:  Negative for adenopathy.   Psychiatric/Behavioral:  Negative for agitation, behavioral problems and confusion. The patient is not nervous/anxious.    Objective:     Vital Signs (Most Recent):  Temp: 97.8 °F (36.6 °C) (01/11/23 1232)  Pulse: 63 (01/11/23 1432)  Resp: 14 (01/11/23 1432)  BP: (!) 152/59 (01/11/23 1432)  SpO2: 100 % (01/11/23 1432)   Vital Signs (24h Range):  Temp:  [97.8 °F (36.6 °C)] 97.8 °F (36.6 °C)  Pulse:  [63-80] 63  Resp:  [14-20] 14  SpO2:  [98 %-100 %] 100 %  BP: (132-165)/(59-73) 152/59     Weight: 70.4 kg (155 lb 1.5 oz)  Body mass index is 23.93 kg/m².    Physical Exam  Vitals and nursing note reviewed.   Constitutional:       Appearance: He is well-developed.   HENT:      Head: Normocephalic and atraumatic.      Nose: Nose normal.   Eyes:      General: No scleral icterus.  Cardiovascular:      Rate and Rhythm: Normal rate and regular rhythm.      Heart sounds: Normal heart sounds. No murmur heard.    No friction rub. No gallop.   Pulmonary:      Effort: Pulmonary effort is normal. No respiratory distress.      Breath sounds: Normal breath sounds. No wheezing.    Abdominal:      General: Bowel sounds are normal. There is no distension.      Palpations: Abdomen is soft.      Tenderness: There is no abdominal tenderness.   Musculoskeletal:         General: Normal range of motion.      Cervical back: Normal range of motion and neck supple.   Skin:     General: Skin is warm and dry.      Findings: No rash.   Neurological:      Mental Status: He is alert and oriented to person, place, and time.      Comments: Mild right facial droop  Mild dysarthria noted   Unsteady gait   No other obvious focal deficits/weakness    Psychiatric:         Behavior: Behavior normal.         Cognition and Memory: Cognition is impaired.           Significant Labs: All pertinent labs within the past 24 hours have been reviewed.    Significant Imaging: I have reviewed all pertinent imaging results/findings within the past 24 hours.

## 2023-01-12 NOTE — ASSESSMENT & PLAN NOTE
Cont ASA  Start Lipitor, Plavix   Check Carotid U/S and Echo: completed  Recent FLP 12/2022 noted   Check Hgb A1c: completed   PT/OT/ST - consulted  Neurology consult - completed, recs on chart  Permissive HTN first 24H- completed  Plavix 75 mg PO x21 days

## 2023-01-12 NOTE — PHARMACY MED REC
"Admission Medication History     The home medication history was taken by Sherman Sebastian.    You may go to "Admission" then "Reconcile Home Medications" tabs to review and/or act upon these items.     The home medication list has been updated by the Pharmacy department.   Please read ALL comments highlighted in yellow.   Please address this information as you see fit.    Feel free to contact us if you have any questions or require assistance.      Medications listed below were obtained from: Patient/family, Analytic software- [employer], and Patient's pharmacy  (Not in a hospital admission)      Potential issues to be addressed PRIOR TO DISCHARGE: NONE    Sherman Sebastian, Pam-Adv  Pharmacy Technician Specialist-Medication History  [facility] [phone]  Secure chat preferred     Current Outpatient Medications on File Prior to Encounter   Medication Sig Dispense Refill Last Dose    amLODIPine (NORVASC) 5 MG tablet Take 1 tablet (5 mg total) by mouth once daily. 90 tablet 3 1/11/2023    aspirin (ECOTRIN) 81 MG EC tablet Take 81 mg by mouth once daily.   1/11/2023    donepeziL (ARICEPT) 10 MG tablet Take 10 mg by mouth nightly.   1/10/2023    memantine (NAMENDA) 10 MG Tab Take 10 mg by mouth 2 (two) times a day.   1/11/2023    multivitamin (THERAGRAN) per tablet Take 1 tablet by mouth once daily.   1/11/2023                           .        "

## 2023-01-12 NOTE — ASSESSMENT & PLAN NOTE
Mild cognitive impairment, able to follow commands  Cont home meds Aricept and Namenda   Supportive care

## 2023-01-12 NOTE — PLAN OF CARE
Pt remains free from falls/injuries this shift. Safety precautions maintained. Pain managed with relaxation. Patient ambulating in room, No BM today. Call light in reach. No s/s of acute distress noted. Will continue to monitor. Chart check completed.

## 2023-01-12 NOTE — PT/OT/SLP EVAL
Physical Therapy Evaluation    Patient Name:  Pravin Ferarri   MRN:  [id number]    Recommendations:     Discharge Recommendations: outpatient PT   Discharge Equipment Recommendations: walker, rolling   Barriers to discharge: None    Assessment:     Pravin Ferrari is a 82 y.o. male admitted with a medical diagnosis of Acute CVA (cerebrovascular accident).  He presents with the following impairments/functional limitations: weakness, gait instability, impaired balance, impaired endurance, impaired self care skills, impaired functional mobility, decreased coordination, decreased safety awareness, decreased lower extremity function, decreased ROM, impaired cognition .    Rehab Prognosis: Good; patient would benefit from acute skilled PT services to address these deficits and reach maximum level of function.    Recent Surgery: * No surgery found *      Plan:     During this hospitalization, patient to be seen 3 x/week to address the identified rehab impairments via gait training, therapeutic activities, therapeutic exercises, neuromuscular re-education and progress toward the following goals:    Plan of Care Expires:  01/26/23    Subjective     Chief Complaint: none   Patient/Family Comments/goals: go home   Pain/Comfort:  Pain Rating 1: 0/10  Pain Rating Post-Intervention 1: 0/10    Patients cultural, spiritual, Congregation conflicts given the current situation:      Living Environment:   PT LIVES AT HOME WITH WIFE IN A ONE STORY H OME WITH NO STEPS TO ENTER HOME   Prior to admission, patients level of function was IND AND DOESN'T DRIVE . PT DOES MOW THE LAWN.  Equipment used at home: none.  DME owned (not currently used): shower chair.  Upon discharge, patient will have assistance from WIFE AND FAMILY .    Objective:     Communicated with NURSE DE PAZ AND Epic CHART REVIEW  prior to session.  Patient found supine with peripheral IV, telemetry  upon PT entry to room.    General Precautions: Standard, fall  Orthopedic  Precautions:N/A   Braces: N/A  Respiratory Status: Room air    Exams:  Cognitive Exam:  Patient is oriented to Person, Place, Time, and WITH EXTENDED TIME TO THINK AND ANSWER QUESTIONS  Postural Exam:  Patient presented with the following abnormalities:    -       Rounded shoulders  -       Forward head  Sensation:    -       Intact  RLE ROM: WFL  RLE Strength: WFL  LLE ROM: WFL  LLE Strength: WFL    Functional Mobility:  Bed Mobility:     Rolling Left:  independence  Supine to Sit: supervision  Transfers:     Sit to Stand:  stand by assistance with no AD  Bed to Chair: contact guard assistance with  no AD  using  Stand Pivot  Gait: PT GT TRAINED X 200' WITH NO AD AND DEC R LE CLEARANCE AND ANTALGIC GT.       AM-PAC 6 CLICK MOBILITY  Total Score:19       Treatment & Education:  PT RETURNED TO  EDUCATED ON ROM TE AP, TKE, AND MIP. PT COMPLETED 10 REPS OF EACH. PT ALSO EDUCATED ON REC FOR RW USE AND EDUCATED ON PROPER USE OF RW TO DEC FALL RISK. PT AGREED TO USE RW HOWEVER PT FAMILY REPORTED HE PROBABLY WONT USE. PT LEFT SEATED EOB WITH FAMILY PRESENT AND ALL NEEDS MET     Patient left sitting edge of bed with call button in reach.    GOALS:   Multidisciplinary Problems       Physical Therapy Goals          Problem: Physical Therapy    Goal Priority Disciplines Outcome Goal Variances Interventions   Physical Therapy Goal     PT, PT/OT      Description: LT23  1. PT WILL COMPLETE BED MOBILITY IND  2. PT WILL T/F TO CHAIR WITH RW AND S  3. PT WILL GT TRAIN X 500' WITH RW AND S                       History:     Past Medical History:   Diagnosis Date    Acute CVA (cerebrovascular accident) 2023    Cancer     prostate    [place] (hard of hearing)     bilateral ears    Hypertension     pt states no       Past Surgical History:   Procedure Laterality Date    COLONOSCOPY N/A 3/19/2019    Procedure: COLONOSCOPY;  Surgeon: Korin Cavazos MD;  Location: [facility];  Service: Endoscopy;  Laterality: N/A;    EYE  SURGERY      HERNIA REPAIR      PROSTATE SURGERY         Time Tracking:     PT Received On: 01/12/23  PT Start Time: 0950     PT Stop Time: 1015  PT Total Time (min): 25 min     Billable Minutes: Evaluation 15 and Therapeutic Activity 10      01/12/2023

## 2023-01-12 NOTE — SUBJECTIVE & OBJECTIVE
Interval History: Referral sent to [facility] for IP Rehab, rep to have informational visit tonight.     Review of Systems   Constitutional:  Positive for activity change. Negative for appetite change, chills, diaphoresis, fatigue and fever.   HENT:  Negative for congestion, nosebleeds, sore throat and trouble swallowing.    Eyes:  Negative for pain, discharge and visual disturbance.   Respiratory:  Positive for cough. Negative for chest tightness, shortness of breath and wheezing.    Cardiovascular:  Negative for chest pain, palpitations and leg swelling.   Gastrointestinal:  Negative for abdominal distention, abdominal pain, blood in stool, constipation, diarrhea, nausea and vomiting.   Endocrine: Negative for cold intolerance and heat intolerance.   Genitourinary:  Negative for difficulty urinating, dysuria, flank pain, frequency and urgency.   Musculoskeletal:  Positive for gait problem (unsteady). Negative for arthralgias, back pain, joint swelling, myalgias, neck pain and neck stiffness.   Skin:  Negative for rash and wound.   Allergic/Immunologic: Negative for food allergies and immunocompromised state.   Neurological:  Positive for facial asymmetry, speech difficulty, weakness and numbness. Negative for dizziness, seizures, syncope, light-headedness and headaches.   Hematological:  Negative for adenopathy.   Psychiatric/Behavioral:  Negative for agitation, behavioral problems and confusion. The patient is not nervous/anxious.    Objective:     Vital Signs (Most Recent):  Temp: 97.7 °F (36.5 °C) (01/12/23 1605)  Pulse: 75 (01/12/23 1605)  Resp: 17 (01/12/23 1605)  BP: (!) 157/72 (01/12/23 1605)  SpO2: 96 % (01/12/23 1605)   Vital Signs (24h Range):  Temp:  [97.7 °F (36.5 °C)-98.7 °F (37.1 °C)] 97.7 °F (36.5 °C)  Pulse:  [67-82] 75  Resp:  [16-20] 17  SpO2:  [95 %-96 %] 96 %  BP: (122-169)/(62-86) 157/72     Weight: 70.3 kg (155 lb)  Body mass index is 24.28 kg/m².  No intake or output data in the 24 hours  ending 01/12/23 1701   Physical Exam  Vitals and nursing note reviewed.   Constitutional:       Appearance: He is well-developed.   HENT:      Head: Normocephalic and atraumatic.      Nose: Nose normal.   Eyes:      General: No scleral icterus.  Cardiovascular:      Rate and Rhythm: Normal rate and regular rhythm.      Heart sounds: Normal heart sounds. No murmur heard.    No friction rub. No gallop.   Pulmonary:      Effort: Pulmonary effort is normal. No respiratory distress.      Breath sounds: Normal breath sounds. No wheezing.   Abdominal:      General: Bowel sounds are normal. There is no distension.      Palpations: Abdomen is soft.      Tenderness: There is no abdominal tenderness.   Musculoskeletal:         General: Normal range of motion.      Cervical back: Normal range of motion and neck supple.   Skin:     General: Skin is warm and dry.      Findings: No rash.   Neurological:      Mental Status: He is alert and oriented to person, place, and time.      Comments: Mild right facial droop  Mild dysarthria noted   Unsteady gait- drags right leg  No other obvious focal deficits/weakness    Psychiatric:         Behavior: Behavior normal.         Cognition and Memory: Cognition is impaired.      Comments: Pleasantly confused       Significant Labs: All pertinent labs within the past 24 hours have been reviewed.  CBC:   Recent Labs   Lab 01/11/23  1409 01/12/23  0424   WBC 6.24 7.88   HGB 13.3* 13.1*   HCT 40.1 40.3    210     CMP:   Recent Labs   Lab 01/11/23  1409 01/12/23  0424    140   K 4.1 4.1    107   CO2 25 23   GLU 91 89   BUN 22 19   CREATININE 1.1 0.9   CALCIUM 9.4 8.8   PROT 6.7 6.4   ALBUMIN 3.5 3.3*   BILITOT 0.4 0.4   ALKPHOS 66 66   AST 22 22   ALT 19 19   ANIONGAP 10 10       Significant Imaging: I have reviewed all pertinent imaging results/findings within the past 24 hours.

## 2023-01-12 NOTE — PLAN OF CARE
PT GT TRAINED X 200' WITH NO AD AND DEC R LE CLEARANCE AND WITH ANTALGIC GT WITH CGA. PT GT TRAINED WITH SBA X 120' WITH RW AND CUES FOR SLOW PACE. P.T. REC OUT PT P.T. @ D/C

## 2023-01-12 NOTE — HOSPITAL COURSE
82 year old male, under the management of Hospital Medicine for Acute CVA, confirmed by MRI. Patient evaluated by PT/OT/ST, family requests to be evaluated by IP rehab, referral sent to [facility], patient did not meet clinical requirement for inpatient rehab, will plan for discharge home with HH and outpatient PT/OT/ST, patient preference is to complete therapy at [facility], referrals placed. Resumed home BP medication today, per rec from Neurology. Will continue Plavix x21 days and ASA 81mg indefinitely. Patient to follow-up with Neurology in 2-3 months. Patient is established with [facility]. Will discharge home today, referrals placed for HH and OP therapy, patient to follow-up with his primary neurologist. Prescriptions sent to pharmacy. Patient given seroquel overnight, increased drowsiness this AM, with more impulsive behaviors, d/c seroquel on D/C. Patient seen and examined on day of discharge and determined stable for discharge.

## 2023-01-12 NOTE — PROGRESS NOTES
[facility] Medicine  Progress Note    Patient Name: Pravin Ferrari  MRN: [id number]  Patient Class: IP- Inpatient   Admission Date: 1/11/2023  Length of Stay: 0 days  Attending Physician: Alcon Serna, *  Primary Care Provider: José Miguel Harrington MD        Subjective:     Principal Problem:Acute CVA (cerebrovascular accident)        HPI:  The patient is a 83 yo male with Prostate cancer, HTN, and mild Dementia who presented to ED after family noticed word finding difficulty, mild dysarthria, unsteady gait, transient right leg weakness/numbness onset yesterday that became more pronounced today.  In the ED, VSS, Labs unremarkable. UA negative. CT head showed chronic microvascular ischemic changes, nothing acute. MRI brain showed acute/subacute basal gangila stroke. CXR pending   EKG showed NSR, nonspecific Twave abnormality     The patient will be placed in observation under hospital medicine       Overview/Hospital Course:  82 year old male, under the management of Hospital Medicine for Acute CVA, confirmed by MRI. Patient evaluated by PT/OT/ST, family requests to be evaluated by IP rehab, referral sent to [facility]. Resumed home BP medication today, per rec from Neurology. Will continue Plavix x21 days and ASA 81mg indefinitely. Patient to follow-up with Neurology in 2-3 months. Patient is established with [facility].       Interval History: Referral sent to [facility] for IP Rehab, rep to have informational visit tonight.     Review of Systems   Constitutional:  Positive for activity change. Negative for appetite change, chills, diaphoresis, fatigue and fever.   HENT:  Negative for congestion, nosebleeds, sore throat and trouble swallowing.    Eyes:  Negative for pain, discharge and visual disturbance.   Respiratory:  Positive for cough. Negative for chest tightness, shortness of breath and wheezing.    Cardiovascular:  Negative for chest pain, palpitations and leg swelling.    Gastrointestinal:  Negative for abdominal distention, abdominal pain, blood in stool, constipation, diarrhea, nausea and vomiting.   Endocrine: Negative for cold intolerance and heat intolerance.   Genitourinary:  Negative for difficulty urinating, dysuria, flank pain, frequency and urgency.   Musculoskeletal:  Positive for gait problem (unsteady). Negative for arthralgias, back pain, joint swelling, myalgias, neck pain and neck stiffness.   Skin:  Negative for rash and wound.   Allergic/Immunologic: Negative for food allergies and immunocompromised state.   Neurological:  Positive for facial asymmetry, speech difficulty, weakness and numbness. Negative for dizziness, seizures, syncope, light-headedness and headaches.   Hematological:  Negative for adenopathy.   Psychiatric/Behavioral:  Negative for agitation, behavioral problems and confusion. The patient is not nervous/anxious.    Objective:     Vital Signs (Most Recent):  Temp: 97.7 °F (36.5 °C) (01/12/23 1605)  Pulse: 75 (01/12/23 1605)  Resp: 17 (01/12/23 1605)  BP: (!) 157/72 (01/12/23 1605)  SpO2: 96 % (01/12/23 1605)   Vital Signs (24h Range):  Temp:  [97.7 °F (36.5 °C)-98.7 °F (37.1 °C)] 97.7 °F (36.5 °C)  Pulse:  [67-82] 75  Resp:  [16-20] 17  SpO2:  [95 %-96 %] 96 %  BP: (122-169)/(62-86) 157/72     Weight: 70.3 kg (155 lb)  Body mass index is 24.28 kg/m².  No intake or output data in the 24 hours ending 01/12/23 1701   Physical Exam  Vitals and nursing note reviewed.   Constitutional:       Appearance: He is well-developed.   HENT:      Head: Normocephalic and atraumatic.      Nose: Nose normal.   Eyes:      General: No scleral icterus.  Cardiovascular:      Rate and Rhythm: Normal rate and regular rhythm.      Heart sounds: Normal heart sounds. No murmur heard.    No friction rub. No gallop.   Pulmonary:      Effort: Pulmonary effort is normal. No respiratory distress.      Breath sounds: Normal breath sounds. No wheezing.   Abdominal:      General:  Bowel sounds are normal. There is no distension.      Palpations: Abdomen is soft.      Tenderness: There is no abdominal tenderness.   Musculoskeletal:         General: Normal range of motion.      Cervical back: Normal range of motion and neck supple.   Skin:     General: Skin is warm and dry.      Findings: No rash.   Neurological:      Mental Status: He is alert and oriented to person, place, and time.      Comments: Mild right facial droop  Mild dysarthria noted   Unsteady gait- drags right leg  No other obvious focal deficits/weakness    Psychiatric:         Behavior: Behavior normal.         Cognition and Memory: Cognition is impaired.      Comments: Pleasantly confused       Significant Labs: All pertinent labs within the past 24 hours have been reviewed.  CBC:   Recent Labs   Lab 01/11/23  1409 01/12/23  0424   WBC 6.24 7.88   HGB 13.3* 13.1*   HCT 40.1 40.3    210     CMP:   Recent Labs   Lab 01/11/23  1409 01/12/23  0424    140   K 4.1 4.1    107   CO2 25 23   GLU 91 89   BUN 22 19   CREATININE 1.1 0.9   CALCIUM 9.4 8.8   PROT 6.7 6.4   ALBUMIN 3.5 3.3*   BILITOT 0.4 0.4   ALKPHOS 66 66   AST 22 22   ALT 19 19   ANIONGAP 10 10       Significant Imaging: I have reviewed all pertinent imaging results/findings within the past 24 hours.      Assessment/Plan:      * Acute CVA (cerebrovascular accident)  Cont ASA  Start Lipitor, Plavix   Check Carotid U/S and Echo: completed  Recent FLP 12/2022 noted   Check Hgb A1c: completed   PT/OT/ST - consulted  Neurology consult - completed, recs on chart  Permissive HTN first 24H- completed  Plavix 75 mg PO x21 days         Dementia  Mild cognitive impairment, able to follow commands  Cont home meds Aricept and Namenda   Supportive care       Essential hypertension  Permissive HTN for first 24H, completed    - restart home medication Amlodipine             VTE Risk Mitigation (From admission, onward)         Ordered     enoxaparin injection 40 mg   Daily         01/11/23 1807     IP VTE HIGH RISK PATIENT  Once         01/11/23 1807     Place sequential compression device  Until discontinued         01/11/23 1807                Discharge Planning   GERMAN:      Code Status: Full Code   Is the patient medically ready for discharge?:     Reason for patient still in hospital (select all that apply): Patient trending condition                     Vika Gilmore NP  Department of Hospital Medicine   [facility] - Med Surg

## 2023-01-12 NOTE — PLAN OF CARE
Problem: Adjustment to Illness (Stroke, Ischemic/Transient Ischemic Attack)  Goal: Optimal Coping  Outcome: Ongoing, Progressing     Problem: Bowel Elimination Impaired (Stroke, Ischemic/Transient Ischemic Attack)  Goal: Effective Bowel Elimination  Outcome: Ongoing, Progressing     Problem: Cerebral Tissue Perfusion (Stroke, Ischemic/Transient Ischemic Attack)  Goal: Optimal Cerebral Tissue Perfusion  Outcome: Ongoing, Progressing     Problem: Cognitive Impairment (Stroke, Ischemic/Transient Ischemic Attack)  Goal: Optimal Cognitive Function  Outcome: Ongoing, Progressing     Problem: Communication Impairment (Stroke, Ischemic/Transient Ischemic Attack)  Goal: Improved Communication Skills  Outcome: Ongoing, Progressing     Problem: Functional Ability Impaired (Stroke, Ischemic/Transient Ischemic Attack)  Goal: Optimal Functional Ability  Outcome: Ongoing, Progressing     Problem: Respiratory Compromise (Stroke, Ischemic/Transient Ischemic Attack)  Goal: Effective Oxygenation and Ventilation  Outcome: Ongoing, Progressing     Problem: Sensorimotor Impairment (Stroke, Ischemic/Transient Ischemic Attack)  Goal: Improved Sensorimotor Function  Outcome: Ongoing, Progressing     Problem: Swallowing Impairment (Stroke, Ischemic/Transient Ischemic Attack)  Goal: Optimal Eating and Swallowing without Aspiration  Outcome: Ongoing, Progressing     Problem: Urinary Elimination Impaired (Stroke, Ischemic/Transient Ischemic Attack)  Goal: Effective Urinary Elimination  Outcome: Ongoing, Progressing     Problem: Fall Injury Risk  Goal: Absence of Fall and Fall-Related Injury  Outcome: Ongoing, Progressing     Problem: Adult Inpatient Plan of Care  Goal: Plan of Care Review  Outcome: Ongoing, Progressing  Goal: Patient-Specific Goal (Individualized)  Outcome: Ongoing, Progressing  Goal: Absence of Hospital-Acquired Illness or Injury  Outcome: Ongoing, Progressing  Goal: Optimal Comfort and Wellbeing  Outcome: Ongoing,  Progressing  Goal: Readiness for Transition of Care  Outcome: Ongoing, Progressing     Problem: Infection  Goal: Absence of Infection Signs and Symptoms  Outcome: Ongoing, Progressing

## 2023-01-12 NOTE — CONSULTS
Food & Nutrition  Education    Diet Education:Cardiac  Learners:Pt.       Nutrition Education provided with handouts:   Cardiac- [facility] Nutrition Therapy([email or web address])       Comments:  81 y/o male, Admitted with Acute CVA  PmH: Hypertension, Dementia   Intake: 75%-100%, Wt: 155lb (normal)   Dietetic Intern educated Pt. On cardiac diet related to hospital diagnosis. Discussed the importance of limiting saturated fats, trans-fat, total fats, cholesterol, and reading labels to avoid further complications of CVA. Discussed recommended food options and foods to avoid that has a high content of the restricted items. Dietetic Intern recommended following the list with acceptable foods, and to read nutrition labels.      NFPE not performed, Pt appeared well nourished.      All questions and concerns answered.     Provided handout with Dietitian's contact information.       Follow-Up: 1/19/2023    Please Re-consult as needed    Thanks!     Zack Megloza, Dietetic Intern

## 2023-01-12 NOTE — ASSESSMENT & PLAN NOTE
Cont ASA  Start Lipitor, Plavix   Check Carotid U/S and Echo  Recent FLP 12/2022 noted   Check Hgb A1c   PT/OT/ST   Neurology consult   Permissive HTN tonight- restart home medication Amlodipine in am

## 2023-01-12 NOTE — PLAN OF CARE
SEE EVAL FOR DETAILS . PT DISPLAYED DEFICITS WITH ADL'S SKILLS, DECREASE FUNCTIONAL MOBILITY AS WELL AS DECREASE B UE STRENGTH/ENDURANCE. RECOMMEND: OUT PATIENT O.T.

## 2023-01-12 NOTE — HPI
The patient is a 83 yo male with Prostate cancer, HTN, and mild Dementia who presented to ED after family noticed word finding difficulty, mild dysarthria, unsteady gait, transient right leg weakness/numbness onset yesterday that became more pronounced today.  In the ED, VSS, Labs unremarkable. UA negative. CT head showed chronic microvascular ischemic changes, nothing acute. MRI brain showed acute/subacute basal gangila stroke. CXR pending   EKG showed NSR, nonspecific Twave abnormality     The patient will be placed in observation under hospital medicine

## 2023-01-13 VITALS
BODY MASS INDEX: 24.33 KG/M2 | RESPIRATION RATE: 16 BRPM | SYSTOLIC BLOOD PRESSURE: 136 MMHG | DIASTOLIC BLOOD PRESSURE: 64 MMHG | TEMPERATURE: 98 F | HEIGHT: 67 IN | HEART RATE: 84 BPM | OXYGEN SATURATION: 98 % | WEIGHT: 155 LBS

## 2023-01-13 LAB
ALBUMIN SERPL BCP-MCNC: 3 G/DL (ref 3.5–5.2)
ALP SERPL-CCNC: 64 U/L (ref 55–135)
ALT SERPL W/O P-5'-P-CCNC: 21 U/L (ref 10–44)
ANION GAP SERPL CALC-SCNC: 12 MMOL/L (ref 8–16)
AST SERPL-CCNC: 23 U/L (ref 10–40)
BASOPHILS # BLD AUTO: 0.06 K/UL (ref 0–0.2)
BASOPHILS NFR BLD: 0.9 % (ref 0–1.9)
BILIRUB SERPL-MCNC: 0.6 MG/DL (ref 0.1–1)
BUN SERPL-MCNC: 18 MG/DL (ref 8–23)
CALCIUM SERPL-MCNC: 8.6 MG/DL (ref 8.7–10.5)
CHLORIDE SERPL-SCNC: 106 MMOL/L (ref 95–110)
CO2 SERPL-SCNC: 19 MMOL/L (ref 23–29)
CREAT SERPL-MCNC: 1 MG/DL (ref 0.5–1.4)
DIFFERENTIAL METHOD: ABNORMAL
EOSINOPHIL # BLD AUTO: 0.4 K/UL (ref 0–0.5)
EOSINOPHIL NFR BLD: 5.4 % (ref 0–8)
ERYTHROCYTE [DISTWIDTH] IN BLOOD BY AUTOMATED COUNT: 13.2 % (ref 11.5–14.5)
EST. GFR  (NO RACE VARIABLE): >60 ML/MIN/1.73 M^2
GLUCOSE SERPL-MCNC: 94 MG/DL (ref 70–110)
HCT VFR BLD AUTO: 46.4 % (ref 40–54)
HGB BLD-MCNC: 13.8 G/DL (ref 14–18)
IMM GRANULOCYTES # BLD AUTO: 0.02 K/UL (ref 0–0.04)
IMM GRANULOCYTES NFR BLD AUTO: 0.3 % (ref 0–0.5)
LYMPHOCYTES # BLD AUTO: 1.5 K/UL (ref 1–4.8)
LYMPHOCYTES NFR BLD: 22.6 % (ref 18–48)
MCH RBC QN AUTO: 29.7 PG (ref 27–31)
MCHC RBC AUTO-ENTMCNC: 29.7 G/DL (ref 32–36)
MCV RBC AUTO: 100 FL (ref 82–98)
MONOCYTES # BLD AUTO: 1.1 K/UL (ref 0.3–1)
MONOCYTES NFR BLD: 16.8 % (ref 4–15)
NEUTROPHILS # BLD AUTO: 3.5 K/UL (ref 1.8–7.7)
NEUTROPHILS NFR BLD: 54 % (ref 38–73)
NRBC BLD-RTO: 0 /100 WBC
PLATELET # BLD AUTO: 206 K/UL (ref 150–450)
PMV BLD AUTO: 10.2 FL (ref 9.2–12.9)
POTASSIUM SERPL-SCNC: 3.9 MMOL/L (ref 3.5–5.1)
PROT SERPL-MCNC: 6.4 G/DL (ref 6–8.4)
RBC # BLD AUTO: 4.64 M/UL (ref 4.6–6.2)
SODIUM SERPL-SCNC: 137 MMOL/L (ref 136–145)
WBC # BLD AUTO: 6.5 K/UL (ref 3.9–12.7)

## 2023-01-13 PROCEDURE — 85025 COMPLETE CBC W/AUTO DIFF WBC: CPT | Performed by: NURSE PRACTITIONER

## 2023-01-13 PROCEDURE — 97530 THERAPEUTIC ACTIVITIES: CPT

## 2023-01-13 PROCEDURE — 25000003 PHARM REV CODE 250: Performed by: NURSE PRACTITIONER

## 2023-01-13 PROCEDURE — 36415 COLL VENOUS BLD VENIPUNCTURE: CPT | Performed by: NURSE PRACTITIONER

## 2023-01-13 PROCEDURE — 80053 COMPREHEN METABOLIC PANEL: CPT | Performed by: NURSE PRACTITIONER

## 2023-01-13 PROCEDURE — 94761 N-INVAS EAR/PLS OXIMETRY MLT: CPT

## 2023-01-13 RX ORDER — BENZONATATE 100 MG/1
100 CAPSULE ORAL 3 TIMES DAILY PRN
Qty: 30 CAPSULE | Refills: 0 | Status: SHIPPED | OUTPATIENT
Start: 2023-01-13 | End: 2023-01-23

## 2023-01-13 RX ORDER — QUETIAPINE FUMARATE 25 MG/1
25 TABLET, FILM COATED ORAL NIGHTLY
Qty: 30 TABLET | Refills: 0 | Status: SHIPPED | OUTPATIENT
Start: 2023-01-13 | End: 2023-01-13 | Stop reason: HOSPADM

## 2023-01-13 RX ORDER — CLOPIDOGREL BISULFATE 75 MG/1
75 TABLET ORAL DAILY
Qty: 21 TABLET | Refills: 0 | Status: SHIPPED | OUTPATIENT
Start: 2023-01-13 | End: 2023-04-24 | Stop reason: SDUPTHER

## 2023-01-13 RX ORDER — ATORVASTATIN CALCIUM 40 MG/1
40 TABLET, FILM COATED ORAL DAILY
Qty: 90 TABLET | Refills: 3 | Status: SHIPPED | OUTPATIENT
Start: 2023-01-13 | End: 2024-01-17

## 2023-01-13 RX ADMIN — AMLODIPINE BESYLATE 5 MG: 5 TABLET ORAL at 08:01

## 2023-01-13 RX ADMIN — THERA TABS 1 TABLET: TAB at 08:01

## 2023-01-13 RX ADMIN — MEMANTINE HYDROCHLORIDE 10 MG: 5 TABLET ORAL at 08:01

## 2023-01-13 RX ADMIN — SENNOSIDES AND DOCUSATE SODIUM 1 TABLET: 8.6; 5 TABLET ORAL at 08:01

## 2023-01-13 RX ADMIN — CLOPIDOGREL BISULFATE 75 MG: 75 TABLET ORAL at 08:01

## 2023-01-13 RX ADMIN — ATORVASTATIN CALCIUM 40 MG: 40 TABLET, FILM COATED ORAL at 08:01

## 2023-01-13 RX ADMIN — ASPIRIN 81 MG: 81 TABLET, COATED ORAL at 08:01

## 2023-01-13 RX ADMIN — POLYETHYLENE GLYCOL 3350 17 G: 17 POWDER, FOR SOLUTION ORAL at 08:01

## 2023-01-13 NOTE — PLAN OF CARE
[facility] - Med Surg  Discharge Final Note    Primary Care Provider: José Miguel Harrington MD    Expected Discharge Date: 1/13/2023    Final Discharge Note (most recent)       Final Note - 01/13/23 1016          Final Note    Assessment Type Final Discharge Note     Anticipated Discharge Disposition Home or Self Care     Hospital Resources/Appts/Education Provided Appointments scheduled by Navigator/Coordinator        Post-Acute Status    Post-Acute Authorization HME     HME Status Set-up Complete/Auth obtained                     Important Message from Medicare            D/c dispo is home with outpatient therapy at [facility] (PT/OT/SLP). PCP follow up arranged for 1/23/23. HME delivered to bedside.

## 2023-01-13 NOTE — PT/OT/SLP PROGRESS
Physical Therapy      Patient Name:  Pravin Ferrari   MRN:  [id number]    Patient unavailable upon attempt @[id number]. Pt out of room during PT follow-up. Will follow-up for progressive mobility if patient does not discharge as scheduled.

## 2023-01-13 NOTE — PLAN OF CARE
Pt ready for discharge, IV and telemetry removed as ordered. Discharge instructions reviewed with the patient and family at the bedside, verbalized understanding. Medications and rolling walker delivered to the bedside.

## 2023-01-13 NOTE — PT/OT/SLP PROGRESS
Occupational Therapy  Treatment    Pravin Ferrari   MRN: [id number]   Admitting Diagnosis: Acute CVA (cerebrovascular accident)    OT Date of Treatment: 01/13/23   OT Start Time: 0917  OT Stop Time: 0930  OT Total Time (min): 13 min    Billable Minutes:  Therapeutic Activity 13 minutes    OT/CASSANDRA: OT          General Precautions: Standard, fall  Orthopedic Precautions: N/A  Braces: N/A  Respiratory Status: Room air         Subjective:  Communicated with nurse and epic chart review prior to session.    Pain/Comfort  Pain Rating 1: 0/10    Objective:  Patient found with: peripheral IV     Functional Mobility:  Bed Mobility:  S with sit<supine  Transfers:    Stand < sit with sba and vc's for safety    Functional Ambulation:   Pt ambulated 250 with rolling walker and vc's for safety and pt impulsive. Pt veer to right and np reported due recent medication provided     Static Sit: FAIR+: Able to take MINIMAL challenges from all directions  Dynamic Sit: FAIR+: Maintains balance through MINIMAL excursions of active trunk motion  Static Stand: FAIR: Maintains without assist but unable to take challenges  Dynamic stand: POOR+: Needs MIN (minimal ) assist during gait    Therapeutic Activities and Exercises:  Patient educated on role of OT in acute setting and benefits of participation. Educated on techniques to use to increase independence and decrease fall risk with functional transfers. Patient stated understanding and in agreement with POC.      AM-PAC 6 CLICK ADL   How much help from another person does this patient currently need?   1 = Unable, Total/Dependent Assistance  2 = A lot, Maximum/Moderate Assistance  3 = A little, Minimum/Contact Guard/Supervision  4 = None, Modified [place]/Independent    Putting on and taking off regular lower body clothing? : 3  Bathing (including washing, rinsing, drying)?: 3  Toileting, which includes using toilet, bedpan, or urinal? : 3  Putting on and taking off regular upper body  "clothing?: 3  Taking care of personal grooming such as brushing teeth?: 4  Eating meals?: 4  Daily Activity Total Score: 20     AM-PAC Raw Score CMS "G-Code Modifier Level of Impairment Assistance   6 % Total / Unable   7 - 8 CM 80 - 100% Maximal Assist   9-13 CL 60 - 80% Moderate Assist   14 - 19 CK 40 - 60% Moderate Assist   20 - 22 CJ 20 - 40% Minimal Assist   23 CI 1-20% SBA / CGA   24 CH 0% Independent/ Mod I       Patient left HOB elevated with all lines intact, call button in reach, nurse notified, and family and np present    ASSESSMENT:  Pravin Ferrari is a 82 y.o. male with a medical diagnosis of Acute CVA (cerebrovascular accident) and presents with debility and generalized weakness  .    Rehab identified problem list/impairments:  weakness, impaired functional mobility, decreased safety awareness, impaired endurance, gait instability, impaired balance, impaired self care skills    Rehab potential is good.    Activity tolerance: Good    Discharge recommendations: outpatient OT   Barriers to discharge:      Equipment recommendations: walker, rolling    GOALS:   Multidisciplinary Problems       Occupational Therapy Goals          Problem: Occupational Therapy    Goal Priority Disciplines Outcome Interventions   Occupational Therapy Goal     OT, PT/OT Ongoing, Progressing    Description: O.T. GOALS TO BE MET BY 1-26-23  MOD (I) WITH LE DRESSING  MOD (I) WITH TOILET TRANSFERS  PT WILL TOLERATE 1 SET XC 15 REPS B UE ROM EXERCISE                       Plan:  Patient to be seen 2 x/week to address the above listed problems via self-care/home management, therapeutic activities, therapeutic exercises  Plan of Care expires:    Plan of Care reviewed with: patient, spouse, family (md present)         01/13/2023  "

## 2023-01-13 NOTE — PLAN OF CARE
Patient remains free from injury this shift. Safety precautions maintained. No s/s of acute distress noted.Cardiac monitoring in place. Chart and orders review complete. Patient education about care complete.       Problem: Adjustment to Illness (Stroke, Ischemic/Transient Ischemic Attack)  Goal: Optimal Coping  Outcome: Ongoing, Progressing     Problem: Bowel Elimination Impaired (Stroke, Ischemic/Transient Ischemic Attack)  Goal: Effective Bowel Elimination  Outcome: Ongoing, Progressing     Problem: Cerebral Tissue Perfusion (Stroke, Ischemic/Transient Ischemic Attack)  Goal: Optimal Cerebral Tissue Perfusion  Outcome: Ongoing, Progressing     Problem: Cognitive Impairment (Stroke, Ischemic/Transient Ischemic Attack)  Goal: Optimal Cognitive Function  Outcome: Ongoing, Progressing     Problem: Communication Impairment (Stroke, Ischemic/Transient Ischemic Attack)  Goal: Improved Communication Skills  Outcome: Ongoing, Progressing     Problem: Functional Ability Impaired (Stroke, Ischemic/Transient Ischemic Attack)  Goal: Optimal Functional Ability  Outcome: Ongoing, Progressing     Problem: Respiratory Compromise (Stroke, Ischemic/Transient Ischemic Attack)  Goal: Effective Oxygenation and Ventilation  Outcome: Ongoing, Progressing     Problem: Sensorimotor Impairment (Stroke, Ischemic/Transient Ischemic Attack)  Goal: Improved Sensorimotor Function  Outcome: Ongoing, Progressing     Problem: Swallowing Impairment (Stroke, Ischemic/Transient Ischemic Attack)  Goal: Optimal Eating and Swallowing without Aspiration  Outcome: Ongoing, Progressing     Problem: Urinary Elimination Impaired (Stroke, Ischemic/Transient Ischemic Attack)  Goal: Effective Urinary Elimination  Outcome: Ongoing, Progressing     Problem: Fall Injury Risk  Goal: Absence of Fall and Fall-Related Injury  Outcome: Ongoing, Progressing     Problem: Adult Inpatient Plan of Care  Goal: Plan of Care Review  Outcome: Ongoing, Progressing  Goal:  Patient-Specific Goal (Individualized)  Outcome: Ongoing, Progressing  Goal: Absence of Hospital-Acquired Illness or Injury  Outcome: Ongoing, Progressing  Goal: Optimal Comfort and Wellbeing  Outcome: Ongoing, Progressing  Goal: Readiness for Transition of Care  Outcome: Ongoing, Progressing     Problem: Infection  Goal: Absence of Infection Signs and Symptoms  Outcome: Ongoing, Progressing

## 2023-01-13 NOTE — DISCHARGE SUMMARY
[facility] Medicine  Discharge Summary      Patient Name: Pravin Ferrari  MRN: [id number]  [facility]: [id number]  Patient Class: IP- Inpatient  Admission Date: 1/11/2023  Hospital Length of Stay: 1 days  Discharge Date and Time:  01/13/2023 11:16 AM  Attending Physician: Alcon Serna, *   Discharging Provider: Vika Gilmore NP  Primary Care Provider: José Miguel Harrington MD    Primary Care Team: Networked reference to record PCT     HPI:   The patient is a 81 yo male with Prostate cancer, HTN, and mild Dementia who presented to ED after family noticed word finding difficulty, mild dysarthria, unsteady gait, transient right leg weakness/numbness onset yesterday that became more pronounced today.  In the ED, VSS, Labs unremarkable. UA negative. CT head showed chronic microvascular ischemic changes, nothing acute. MRI brain showed acute/subacute basal gangila stroke. CXR pending   EKG showed NSR, nonspecific Twave abnormality     The patient will be placed in observation under hospital medicine       * No surgery found *      Hospital Course:   82 year old male, under the management of Hospital Medicine for Acute CVA, confirmed by MRI. Patient evaluated by PT/OT/ST, family requests to be evaluated by IP rehab, referral sent to [facility], patient did not meet clinical requirement for inpatient rehab, will plan for discharge home with HH and outpatient PT/OT/ST, patient preference is to complete therapy at [facility], referrals placed. Resumed home BP medication today, per rec from Neurology. Will continue Plavix x21 days and ASA 81mg indefinitely. Patient to follow-up with Neurology in 2-3 months. Patient is established with [facility]. Will discharge home today, referrals placed for HH and OP therapy, patient to follow-up with his primary neurologist. Prescriptions sent to pharmacy. Patient given seroquel overnight, increased drowsiness this AM, with more impulsive  "behaviors, d/c seroquel on D/C. Patient seen and examined on day of discharge and determined stable for discharge.        Goals of Care Treatment Preferences:  Code Status: Full Code      Consults:   Consults (From admission, onward)        Status Ordering Provider     Inpatient consult to Social Work  Once        Provider:  (Not yet assigned)    Completed CITLALI HERNÁNDEZ     Inpatient consult to Neurology  Once        Provider:  Dennis Meredith MD    Completed ZO BRYSON     Inpatient consult to Registered Dietitian/Nutritionist  Once        Provider:  (Not yet assigned)    ZO Cortez          * Acute CVA (cerebrovascular accident)  Cont ASA  Start Lipitor, Plavix   Check Carotid U/S and Echo: completed  Recent FLP 12/2022 noted   Check Hgb A1c: completed   PT/OT/ST - consulted  Neurology consult - completed, recs on chart  Permissive HTN first 24H- completed  Plavix 75 mg PO x21 days     D/C home with , outpatient PT/OT/ST, patient preference for [facility], referrals sent    Dementia  Mild cognitive impairment, able to follow commands  Cont home meds Aricept and Namenda         Essential hypertension  Permissive HTN for first 24H, completed    - restart home medication Amlodipine             Final Active Diagnoses:    Diagnosis Date Noted POA    PRINCIPAL PROBLEM:  Acute CVA (cerebrovascular accident) [I63.9] 01/11/2023 Yes    Dementia [F03.90] 02/15/2022 Yes    Essential hypertension [I10] 04/11/2018 Yes     Chronic      Problems Resolved During this Admission:       Discharged Condition: stable    Disposition: Home-Health Care c    Follow Up:    Patient Instructions:      WALKER FOR HOME USE     Order Specific Question Answer Comments   Type of Walker: Rollator with brakes and/or seat    With wheels? Yes    Height: 5' 7" (1.702 m)    Weight: 70.3 kg (155 lb)    Length of need (1-99 months): 99    Does patient have medical equipment at home? none    Please check " all that apply: Patient's condition impairs ambulation.    Please check all that apply: Patient is unable to safely ambulate without equipment.    Please check all that apply: Altered sensory perception.    Please check all that apply: Patient needs help to get in and out of chair.    Please check all that apply: Walker will be used for gait training.      Ambulatory referral/consult to Speech Therapy   Standing Status: Future   Referral Priority: Routine Referral Type: Speech Therapy   Referral Reason: Specialty Services Required   Requested Specialty: Speech Pathology   Number of Visits Requested: 1     Ambulatory referral/consult to Home Health   Standing Status: Future   Referral Priority: Routine Referral Type: Home Health   Referral Reason: Specialty Services Required   Requested Specialty: Home Health Services   Number of Visits Requested: 1     Ambulatory referral/consult to [facility]   Standing Status: Future   Referral Priority: Routine Referral Type: Consultation   Referral Reason: Specialty Services Required   Number of Visits Requested: 1     Ambulatory referral/consult to Physical/Occupational Therapy   Standing Status: Future   Referral Priority: Routine Referral Type: Physical Medicine   Referral Reason: Specialty Services Required   Requested Specialty: Physical Therapy   Number of Visits Requested: 1     Ambulatory referral/consult to Physical/Occupational Therapy   Standing Status: Future   Referral Priority: Routine Referral Type: Physical Medicine   Referral Reason: Specialty Services Required   Requested Specialty: Occupational Therapy   Number of Visits Requested: 1     Diet Cardiac   Order Comments: See Stroke Patient Education Guide Booklet for details.     Activity as tolerated     Call MD for:  temperature >100.4     Call MD for:  persistent nausea and vomiting or diarrhea     Call MD for:  severe uncontrolled pain     Call MD for:  increased confusion or weakness     Call  911 for any of the following:   Order Comments: Call 911  right away if any of the following warning signs come on suddenly, even if the symptoms only last for a few minutes. With stroke, timing is very important.   - Warning Signs of Stroke:  - Weakness: You may feel a sudden weakness, tingling or loss of feeling on one side of your face or body.  - Vision Problems: You may have sudden double vision or trouble seeing in one or both eyes.  - Speech Problems: You may have sudden trouble talking, slured speech, or problems understanding others.  - Headache: You may have sudden, severe headache.  - Movement Problems: You may experience dizziness, a feeling of spinning, a loss of balance, a feeling of falling or blackouts.       Significant Diagnostic Studies: Labs:   CMP   Recent Labs   Lab 01/11/23  1409 01/12/23  0424 01/13/23  0553    140 137   K 4.1 4.1 3.9    107 106   CO2 25 23 19*   GLU 91 89 94   BUN 22 19 18   CREATININE 1.1 0.9 1.0   CALCIUM 9.4 8.8 8.6*   PROT 6.7 6.4 6.4   ALBUMIN 3.5 3.3* 3.0*   BILITOT 0.4 0.4 0.6   ALKPHOS 66 66 64   AST 22 22 23   ALT 19 19 21   ANIONGAP 10 10 12    and CBC   Recent Labs   Lab 01/11/23  1409 01/12/23  0424 01/13/23  0553   WBC 6.24 7.88 6.50   HGB 13.3* 13.1* 13.8*   HCT 40.1 40.3 46.4    210 206     Radiology: All imaging studies reviewed.     Pending Diagnostic Studies:     None         Medications:  Reconciled Home Medications:      Medication List      START taking these medications    atorvastatin 40 MG tablet  Commonly known as: LIPITOR  Take 1 tablet (40 mg total) by mouth once daily.     benzonatate 100 MG capsule  Commonly known as: TESSALON  Take 1 capsule (100 mg total) by mouth 3 (three) times daily as needed for Cough.     clopidogreL 75 mg tablet  Commonly known as: PLAVIX  Take 1 tablet (75 mg total) by mouth once daily. for 21 days        CONTINUE taking these medications    amLODIPine 5 MG tablet  Commonly known as: NORVASC  Take 1  tablet (5 mg total) by mouth once daily.     aspirin 81 MG EC tablet  Commonly known as: ECOTRIN  Take 81 mg by mouth once daily.     donepeziL 10 MG tablet  Commonly known as: ARICEPT  Take 10 mg by mouth nightly.     memantine 10 MG Tab  Commonly known as: NAMENDA  Take 10 mg by mouth 2 (two) times a day.     multivitamin per tablet  Commonly known as: THERAGRAN  Take 1 tablet by mouth once daily.            Indwelling Lines/Drains at time of discharge:   Lines/Drains/Airways     None                 Time spent on the discharge of patient: 75 minutes         Vika Gilmore NP  Department of Hospital Medicine  [facility]

## 2023-01-13 NOTE — ASSESSMENT & PLAN NOTE
Cont ASA  Start Lipitor, Plavix   Check Carotid U/S and Echo: completed  Recent FLP 12/2022 noted   Check Hgb A1c: completed   PT/OT/ST - consulted  Neurology consult - completed, recs on chart  Permissive HTN first 24H- completed  Plavix 75 mg PO x21 days     D/C home with HH, outpatient PT/OT/ST, patient preference for [facility], referrals sent

## 2023-01-13 NOTE — PLAN OF CARE
[facility]  Initial Discharge Assessment       Primary Care Provider: José Miguel Harrington MD    Admission Diagnosis: Slurred speech [R47.81]  Gait abnormality [R26.9]  Stroke [I63.9]  Acute CVA (cerebrovascular accident) [I63.9]    Admission Date: 1/11/2023  Expected Discharge Date: 1/13/2023    Discharge Barriers Identified: None    Payor: [employer] [facility] MCARE / Plan: [employer] [facility] MEDICARE ADVANTAGE / Product Type: Medicare Advantage /     Extended Emergency Contact Information  Primary Emergency Contact: Radha Ferrari  Address: [address] [place] [address] [place]  Mobile Phone: [phone]  Relation: Spouse    Discharge Plan A: Home with family  Discharge Plan B: Home with family      [id number] - [place] - [address] AT [facility]  [address]  [place] [address]  Phone: [phone] Fax: [phone]    [facility]  [address]  [place] [address]  Phone: [phone] Fax: [phone]      Initial Assessment (most recent)       Adult Discharge Assessment - 01/12/23 1416          Discharge Assessment    Assessment Type Discharge Planning Assessment     Confirmed/corrected address, phone number and insurance Yes     Confirmed Demographics Correct on Facesheet     Source of Information family     Communicated GERMAN with patient/caregiver Yes     People in Home spouse;child(kvng), adult     Do you expect to return to your current living situation? Yes     Do you have help at home or someone to help you manage your care at home? Yes     Who are your caregiver(s) and their phone number(s)? Radha Ferrari (wife) [phone]     Prior to hospitilization cognitive status: Not Oriented to Place;Not Oriented to Time     Current cognitive status: Not Oriented to Place;Not Oriented to Time     Equipment Currently Used at Home none     Readmission within 30 days? No      Patient currently being followed by outpatient case management? No     Do you currently have service(s) that help you manage your care at home? No     Do you take prescription medications? Yes     Do you have prescription coverage? Yes     Is the patient taking medications as prescribed? yes     Who is going to help you get home at discharge? Radha Ferrari (wife) [phone]     How do you get to doctors appointments? family or friend will provide     Are you on dialysis? No     Do you take coumadin? No     Discharge Plan A Home with family     Discharge Plan B Home with family     DME Needed Upon Discharge  none     Discharge Plan discussed with: Patient     Discharge Barriers Identified None                      Met with pt and wife at bedside for DC assessment. Pt lives at home with his wife and does not use any DME. Pt and wife requested OP PT/OT/ST. Referral made to [facility] per pt request. SWer provided a transitional care folder, information on advanced directives, information on pharmacy bedside delivery, and discharge planning begins on admission with contact information for any needs/questions.    Richard Faustin LMSW 1/13/2023 9:20 AM

## 2023-01-17 ENCOUNTER — TELEPHONE (OUTPATIENT)
Dept: PHYSICAL MEDICINE AND REHAB | Facility: CLINIC | Age: 83
End: 2023-01-17
Payer: MEDICARE

## 2023-01-17 NOTE — TELEPHONE ENCOUNTER
Reached out to patient from Stroke Core Measures Report and offered services of PMR/Dr. Dowd.  Patient's wife took call and declined services at this time, but voiced how very thankful she was for us checking in with them. Information was provided as well as a contact number if ever needed.  Patient verbalized understanding.  Mis Waters RN

## 2023-01-18 ENCOUNTER — TELEPHONE (OUTPATIENT)
Dept: HEPATOLOGY | Facility: HOSPITAL | Age: 83
End: 2023-01-18
Payer: MEDICARE

## 2023-01-18 DIAGNOSIS — I63.9 CEREBROVASCULAR ACCIDENT (CVA), UNSPECIFIED MECHANISM: Primary | ICD-10-CM

## 2023-01-20 ENCOUNTER — PATIENT OUTREACH (OUTPATIENT)
Dept: ADMINISTRATIVE | Facility: HOSPITAL | Age: 83
End: 2023-01-20
Payer: MEDICARE

## 2023-01-20 NOTE — PROGRESS NOTES
HOSPITAL FOLLOW UP: Attempting to contact patient to confirm hospital follow up appointment with Dr José Miguel Harrington on 01/23/23.  Unable to reach patient at this time left voice mail.

## 2023-01-22 DIAGNOSIS — I10 ESSENTIAL HYPERTENSION: Primary | ICD-10-CM

## 2023-01-22 NOTE — TELEPHONE ENCOUNTER
No new care gaps identified.  [facility] Embedded Care Gaps. Reference number: [id number]. 1/22/2023   4:13:47 AM CST

## 2023-01-23 ENCOUNTER — OFFICE VISIT (OUTPATIENT)
Dept: INTERNAL MEDICINE | Facility: CLINIC | Age: 83
End: 2023-01-23
Payer: MEDICARE

## 2023-01-23 VITALS
OXYGEN SATURATION: 99 % | DIASTOLIC BLOOD PRESSURE: 82 MMHG | HEART RATE: 86 BPM | RESPIRATION RATE: 16 BRPM | BODY MASS INDEX: 23.46 KG/M2 | HEIGHT: 67 IN | SYSTOLIC BLOOD PRESSURE: 138 MMHG | TEMPERATURE: 97 F | WEIGHT: 149.5 LBS

## 2023-01-23 DIAGNOSIS — I63.9 DYSARTHRIA DUE TO ACUTE CEREBROVASCULAR ACCIDENT (CVA): ICD-10-CM

## 2023-01-23 DIAGNOSIS — I70.0 AORTIC ATHEROSCLEROSIS: ICD-10-CM

## 2023-01-23 DIAGNOSIS — I10 ESSENTIAL HYPERTENSION: Chronic | ICD-10-CM

## 2023-01-23 DIAGNOSIS — R53.1 WEAKNESS OF RIGHT SIDE OF BODY: ICD-10-CM

## 2023-01-23 DIAGNOSIS — I63.9 ACUTE CVA (CEREBROVASCULAR ACCIDENT): ICD-10-CM

## 2023-01-23 DIAGNOSIS — I67.2 CEREBRAL ATHEROSCLEROSIS: Chronic | ICD-10-CM

## 2023-01-23 DIAGNOSIS — Z09 HOSPITAL DISCHARGE FOLLOW-UP: Primary | ICD-10-CM

## 2023-01-23 DIAGNOSIS — R47.1 DYSARTHRIA DUE TO ACUTE CEREBROVASCULAR ACCIDENT (CVA): ICD-10-CM

## 2023-01-23 DIAGNOSIS — R26.81 GAIT INSTABILITY: ICD-10-CM

## 2023-01-23 DIAGNOSIS — F03.90 DEMENTIA, UNSPECIFIED DEMENTIA SEVERITY, UNSPECIFIED DEMENTIA TYPE, UNSPECIFIED WHETHER BEHAVIORAL, PSYCHOTIC, OR MOOD DISTURBANCE OR ANXIETY: ICD-10-CM

## 2023-01-23 PROCEDURE — 1126F PR PAIN SEVERITY QUANTIFIED, NO PAIN PRESENT: ICD-10-PCS | Mod: CPTII,S$GLB,, | Performed by: FAMILY MEDICINE

## 2023-01-23 PROCEDURE — 1160F PR REVIEW ALL MEDS BY PRESCRIBER/CLIN PHARMACIST DOCUMENTED: ICD-10-PCS | Mod: CPTII,S$GLB,, | Performed by: FAMILY MEDICINE

## 2023-01-23 PROCEDURE — 3075F SYST BP GE 130 - 139MM HG: CPT | Mod: CPTII,S$GLB,, | Performed by: FAMILY MEDICINE

## 2023-01-23 PROCEDURE — 1159F MED LIST DOCD IN RCRD: CPT | Mod: CPTII,S$GLB,, | Performed by: FAMILY MEDICINE

## 2023-01-23 PROCEDURE — 1126F AMNT PAIN NOTED NONE PRSNT: CPT | Mod: CPTII,S$GLB,, | Performed by: FAMILY MEDICINE

## 2023-01-23 PROCEDURE — 3075F PR MOST RECENT SYSTOLIC BLOOD PRESS GE 130-139MM HG: ICD-10-PCS | Mod: CPTII,S$GLB,, | Performed by: FAMILY MEDICINE

## 2023-01-23 PROCEDURE — 3079F DIAST BP 80-89 MM HG: CPT | Mod: CPTII,S$GLB,, | Performed by: FAMILY MEDICINE

## 2023-01-23 PROCEDURE — 3079F PR MOST RECENT DIASTOLIC BLOOD PRESSURE 80-89 MM HG: ICD-10-PCS | Mod: CPTII,S$GLB,, | Performed by: FAMILY MEDICINE

## 2023-01-23 PROCEDURE — 99999 PR PBB SHADOW E&M-EST. PATIENT-LVL IV: CPT | Mod: PBBFAC,,, | Performed by: FAMILY MEDICINE

## 2023-01-23 PROCEDURE — 3288F PR FALLS RISK ASSESSMENT DOCUMENTED: ICD-10-PCS | Mod: CPTII,S$GLB,, | Performed by: FAMILY MEDICINE

## 2023-01-23 PROCEDURE — 99214 PR OFFICE/OUTPT VISIT, EST, LEVL IV, 30-39 MIN: ICD-10-PCS | Mod: S$GLB,,, | Performed by: FAMILY MEDICINE

## 2023-01-23 PROCEDURE — 1160F RVW MEDS BY RX/DR IN RCRD: CPT | Mod: CPTII,S$GLB,, | Performed by: FAMILY MEDICINE

## 2023-01-23 PROCEDURE — 1101F PT FALLS ASSESS-DOCD LE1/YR: CPT | Mod: CPTII,S$GLB,, | Performed by: FAMILY MEDICINE

## 2023-01-23 PROCEDURE — 1159F PR MEDICATION LIST DOCUMENTED IN MEDICAL RECORD: ICD-10-PCS | Mod: CPTII,S$GLB,, | Performed by: FAMILY MEDICINE

## 2023-01-23 PROCEDURE — 1101F PR PT FALLS ASSESS DOC 0-1 FALLS W/OUT INJ PAST YR: ICD-10-PCS | Mod: CPTII,S$GLB,, | Performed by: FAMILY MEDICINE

## 2023-01-23 PROCEDURE — 3288F FALL RISK ASSESSMENT DOCD: CPT | Mod: CPTII,S$GLB,, | Performed by: FAMILY MEDICINE

## 2023-01-23 PROCEDURE — 99999 PR PBB SHADOW E&M-EST. PATIENT-LVL IV: ICD-10-PCS | Mod: PBBFAC,,, | Performed by: FAMILY MEDICINE

## 2023-01-23 PROCEDURE — 99214 OFFICE O/P EST MOD 30 MIN: CPT | Mod: S$GLB,,, | Performed by: FAMILY MEDICINE

## 2023-01-23 RX ORDER — AMLODIPINE BESYLATE 5 MG/1
5 TABLET ORAL DAILY
Qty: 90 TABLET | Refills: 3 | Status: SHIPPED | OUTPATIENT
Start: 2023-01-23 | End: 2024-01-16

## 2023-01-23 NOTE — PROGRESS NOTES
Subjective:   Patient ID: Pravin Ferrari is a 82 y.o. male.  Chief Complaint:  Follow-up (Hospital )    Presents for hospital discharge follow-up/transitional care visit   Admitted to [facility] 1/11/2023 through 1/13/2023   MRI confirmed Acute CVA with resultant right-sided weakness, gait instability, and dysarthria  Neurology consulted   Recommended aspirin 81 mg daily indefinitely, start Lipitor 40 mg daily, and Plavix 75 mg once daily for 21 days.  due to additional cerebral atherosclerosis, aortic atherosclerosis, and for secondary stroke prevention  Started PT, OT, and ST in the hospital   Had significant improvement in symptoms despite not return to previous baseline.    Did not meet criteria for inpatient rehab   Discharged home in continued on previously prescribed amlodipine 5 mg daily, Namenda 10 mg twice a day, and Aricept 10 mg nightly   Scheduled to start PT, OT, and ST at [facility] on 1/31/2023   Has follow-up visit with Dr. Soriano at [facility] on 1/30/2023    Today reports continued improvement in symptoms since discharge   Specifically improvement in speech and overall balance/gait instability  Does feel that cognitive impairment has worsened since stroke   Reports compliance with all medications since discharge     No new complaints concerns today    Review of Systems   Constitutional:  Negative for chills, fatigue and fever.   HENT:  Positive for hearing loss. Negative for congestion, dental problem, ear discharge, ear pain, postnasal drip, rhinorrhea, sinus pressure, sinus pain, sore throat and trouble swallowing.    Eyes:  Negative for pain, redness and visual disturbance.   Respiratory:  Negative for cough, chest tightness, shortness of breath and wheezing.    Cardiovascular:  Negative for chest pain, palpitations and leg swelling.   Gastrointestinal:  Negative for abdominal pain, constipation, diarrhea, nausea and vomiting.   Endocrine: Negative for polydipsia,  "polyphagia and polyuria.   Genitourinary:  Negative for difficulty urinating, dysuria, flank pain, frequency, hematuria and urgency.   Musculoskeletal:  Positive for gait problem. Negative for myalgias and neck pain.   Skin:  Negative for rash.   Neurological:  Positive for facial asymmetry, speech difficulty and weakness. Negative for dizziness, tremors, seizures, syncope, light-headedness, numbness and headaches.   Hematological:  Negative for adenopathy.   Psychiatric/Behavioral:  Positive for confusion. Negative for agitation, behavioral problems, decreased concentration, dysphoric mood, hallucinations and sleep disturbance. The patient is not nervous/anxious and is not hyperactive.      Objective:   /82 (BP Location: Right arm, Patient Position: Sitting, BP Method: Medium (Manual))   Pulse 86   Temp 97.1 °F (36.2 °C) (Tympanic)   Resp 16   Ht 5' 7" (1.702 m)   Wt 67.8 kg (149 lb 7.6 oz)   SpO2 99%   BMI 23.41 kg/m²     Physical Exam  Vitals and nursing note reviewed.   Constitutional:       Appearance: Normal appearance. He is well-developed and normal weight.   HENT:      Right Ear: Decreased hearing noted.      Left Ear: Decreased hearing noted.   Neck:      Thyroid: No thyroid mass, thyromegaly or thyroid tenderness.      Vascular: No carotid bruit.   Cardiovascular:      Rate and Rhythm: Normal rate and regular rhythm.      Heart sounds: Normal heart sounds. No murmur heard.  Pulmonary:      Effort: Pulmonary effort is normal.      Breath sounds: Normal breath sounds and air entry.   Abdominal:      General: There is no distension.      Palpations: Abdomen is soft.      Tenderness: There is no abdominal tenderness.   Skin:     Findings: No rash.   Neurological:      Cranial Nerves: Dysarthria and facial asymmetry present.      Gait: Gait abnormal.   Psychiatric:         Mood and Affect: Mood and affect normal.       Assessment:       ICD-10-CM ICD-9-CM   1. Hospital discharge follow-up  Z09 " V67.59   2. Acute CVA (cerebrovascular accident)  I63.9 434.91   3. Cerebral atherosclerosis  I67.2 437.0   4. Aortic atherosclerosis  I70.0 440.0   5. Weakness of right side of body  R53.1 728.87   6. Gait instability  R26.81 781.2   7. Dysarthria due to acute cerebrovascular accident (CVA)  I63.9 434.91    R47.1 784.51   8. Essential hypertension  I10 401.9   9. 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  Stable.  Asymptomatic.  BP at goal.    Continue amlodipine 5 mg daily     Dementia, unspecified dementia severity, unspecified dementia type, unspecified whether behavioral, psychotic, or mood disturbance or anxiety  Mild increased cognitive deficit since CVA   Continue Namenda 10 mg twice a day  Continue Aricept 10 mg nightly    Return to clinic 3 months or sooner if needed    Transitional Care Note    Family and/or Caretaker present at visit?  Yes.  Diagnostic tests reviewed/disposition: I have reviewed all completed as well as pending diagnostic tests at the time of discharge.  Disease/illness education:  Post CVA care and statin indication for secondary stroke prevention  Home health/community services discussion/referrals: Patient does not have home health established from hospital visit.  They do not need home health.  If needed, we will set up home  health for the patient.   Establishment or re-establishment of referral orders for community resources: No other necessary community resources.   Discussion with other health care providers: No discussion with other health care providers necessary.

## 2023-01-25 ENCOUNTER — TELEPHONE (OUTPATIENT)
Dept: INTERNAL MEDICINE | Facility: CLINIC | Age: 83
End: 2023-01-25
Payer: MEDICARE

## 2023-01-25 NOTE — TELEPHONE ENCOUNTER
Spoke with pt spouse; MA reviewed PCP notes with her; pt wife verbalized understanding; she stated pt has an appt with his Neurologist on 1/30 /LD   PROBLEM DIAGNOSES  Problem: Ruptured right breast implant  Assessment and Plan: Pt. is scheduled for a right capsulectomy and right implant removal...removal left breast implant 9/22/20.  Pt. verbalized understanding of instructions and that Chlorhexidine is for external use.  Pt. had medical clearance yesterday, 9/14/20.  Pt. had labs performed at that visit.  Will request results.  Pt. is scheduled for COVID test 9/19/20.

## 2023-01-25 NOTE — TELEPHONE ENCOUNTER
Spoke with pt wife; she stated pt has been sleeping more than usual and usual has a hard time getting out of bed; she states pt doesn't want to drink water only juice or green tea; pt isn't complaining of headaches; pt is asking is there anything she should be worried about /LD

## 2023-03-06 PROCEDURE — 99285 EMERGENCY DEPT VISIT HI MDM: CPT | Mod: 25

## 2023-03-07 ENCOUNTER — HOSPITAL ENCOUNTER (EMERGENCY)
Facility: HOSPITAL | Age: 83
Discharge: HOME | End: 2023-03-07
Attending: EMERGENCY MEDICINE
Payer: MEDICARE

## 2023-03-07 VITALS
RESPIRATION RATE: 18 BRPM | OXYGEN SATURATION: 100 % | SYSTOLIC BLOOD PRESSURE: 159 MMHG | HEART RATE: 67 BPM | WEIGHT: 150.56 LBS | DIASTOLIC BLOOD PRESSURE: 73 MMHG | TEMPERATURE: 98 F | BODY MASS INDEX: 21.55 KG/M2 | HEIGHT: 70 IN

## 2023-03-07 DIAGNOSIS — R53.1 WEAKNESS: Primary | ICD-10-CM

## 2023-03-07 DIAGNOSIS — R20.0 NUMBNESS: ICD-10-CM

## 2023-03-07 LAB
ALBUMIN SERPL BCP-MCNC: 3.4 G/DL (ref 3.5–5.2)
ALP SERPL-CCNC: 73 U/L (ref 55–135)
ALT SERPL W/O P-5'-P-CCNC: 27 U/L (ref 10–44)
ANION GAP SERPL CALC-SCNC: 10 MMOL/L (ref 8–16)
AST SERPL-CCNC: 25 U/L (ref 10–40)
BASOPHILS # BLD AUTO: 0.06 K/UL (ref 0–0.2)
BASOPHILS NFR BLD: 0.9 % (ref 0–1.9)
BILIRUB SERPL-MCNC: 0.4 MG/DL (ref 0.1–1)
BILIRUB UR QL STRIP: NEGATIVE
BUN SERPL-MCNC: 21 MG/DL (ref 8–23)
CALCIUM SERPL-MCNC: 8.5 MG/DL (ref 8.7–10.5)
CHLORIDE SERPL-SCNC: 107 MMOL/L (ref 95–110)
CLARITY UR: CLEAR
CO2 SERPL-SCNC: 22 MMOL/L (ref 23–29)
COLOR UR: YELLOW
CREAT SERPL-MCNC: 0.9 MG/DL (ref 0.5–1.4)
DIFFERENTIAL METHOD: ABNORMAL
EOSINOPHIL # BLD AUTO: 0.8 K/UL (ref 0–0.5)
EOSINOPHIL NFR BLD: 11.6 % (ref 0–8)
ERYTHROCYTE [DISTWIDTH] IN BLOOD BY AUTOMATED COUNT: 13.7 % (ref 11.5–14.5)
EST. GFR  (NO RACE VARIABLE): >60 ML/MIN/1.73 M^2
GLUCOSE SERPL-MCNC: 96 MG/DL (ref 70–110)
GLUCOSE UR QL STRIP: NEGATIVE
HCT VFR BLD AUTO: 39.3 % (ref 40–54)
HGB BLD-MCNC: 12.8 G/DL (ref 14–18)
HGB UR QL STRIP: NEGATIVE
IMM GRANULOCYTES # BLD AUTO: 0.02 K/UL (ref 0–0.04)
IMM GRANULOCYTES NFR BLD AUTO: 0.3 % (ref 0–0.5)
INFLUENZA A, MOLECULAR: NEGATIVE
INFLUENZA B, MOLECULAR: NEGATIVE
KETONES UR QL STRIP: NEGATIVE
LEUKOCYTE ESTERASE UR QL STRIP: NEGATIVE
LYMPHOCYTES # BLD AUTO: 1.7 K/UL (ref 1–4.8)
LYMPHOCYTES NFR BLD: 24.9 % (ref 18–48)
MCH RBC QN AUTO: 29.6 PG (ref 27–31)
MCHC RBC AUTO-ENTMCNC: 32.6 G/DL (ref 32–36)
MCV RBC AUTO: 91 FL (ref 82–98)
MONOCYTES # BLD AUTO: 1.1 K/UL (ref 0.3–1)
MONOCYTES NFR BLD: 16.8 % (ref 4–15)
NEUTROPHILS # BLD AUTO: 3.1 K/UL (ref 1.8–7.7)
NEUTROPHILS NFR BLD: 45.5 % (ref 38–73)
NITRITE UR QL STRIP: NEGATIVE
NRBC BLD-RTO: 0 /100 WBC
PH UR STRIP: 7 [PH] (ref 5–8)
PLATELET # BLD AUTO: 208 K/UL (ref 150–450)
PMV BLD AUTO: 10.6 FL (ref 9.2–12.9)
POTASSIUM SERPL-SCNC: 4.2 MMOL/L (ref 3.5–5.1)
PROT SERPL-MCNC: 6.8 G/DL (ref 6–8.4)
PROT UR QL STRIP: NEGATIVE
RBC # BLD AUTO: 4.33 M/UL (ref 4.6–6.2)
SARS-COV-2 RDRP RESP QL NAA+PROBE: NEGATIVE
SODIUM SERPL-SCNC: 139 MMOL/L (ref 136–145)
SP GR UR STRIP: 1.02 (ref 1–1.03)
SPECIMEN SOURCE: NORMAL
TROPONIN I SERPL DL<=0.01 NG/ML-MCNC: <0.006 NG/ML (ref 0–0.03)
URN SPEC COLLECT METH UR: NORMAL
UROBILINOGEN UR STRIP-ACNC: NEGATIVE EU/DL
WBC # BLD AUTO: 6.74 K/UL (ref 3.9–12.7)

## 2023-03-07 PROCEDURE — 93005 ELECTROCARDIOGRAM TRACING: CPT

## 2023-03-07 PROCEDURE — 85025 COMPLETE CBC W/AUTO DIFF WBC: CPT | Performed by: NURSE PRACTITIONER

## 2023-03-07 PROCEDURE — 84484 ASSAY OF TROPONIN QUANT: CPT | Performed by: NURSE PRACTITIONER

## 2023-03-07 PROCEDURE — 80053 COMPREHEN METABOLIC PANEL: CPT | Performed by: NURSE PRACTITIONER

## 2023-03-07 PROCEDURE — 87502 INFLUENZA DNA AMP PROBE: CPT | Performed by: EMERGENCY MEDICINE

## 2023-03-07 PROCEDURE — 93010 ELECTROCARDIOGRAM REPORT: CPT | Mod: ,,, | Performed by: INTERNAL MEDICINE

## 2023-03-07 PROCEDURE — 96360 HYDRATION IV INFUSION INIT: CPT

## 2023-03-07 PROCEDURE — 25000003 PHARM REV CODE 250: Performed by: EMERGENCY MEDICINE

## 2023-03-07 PROCEDURE — 93010 EKG 12-LEAD: ICD-10-PCS | Mod: ,,, | Performed by: INTERNAL MEDICINE

## 2023-03-07 PROCEDURE — 81003 URINALYSIS AUTO W/O SCOPE: CPT | Performed by: EMERGENCY MEDICINE

## 2023-03-07 PROCEDURE — U0002 COVID-19 LAB TEST NON-CDC: HCPCS | Performed by: EMERGENCY MEDICINE

## 2023-03-07 RX ADMIN — SODIUM CHLORIDE 500 ML: 9 INJECTION, SOLUTION INTRAVENOUS at 02:03

## 2023-03-07 NOTE — ED PROVIDER NOTES
"SCRIBE #1 NOTE: I, Darek Gary, am scribing for, and in the presence of, Roshan Hopkins Do, MD. I have scribed the entire note.       History     Chief Complaint   Patient presents with    Numbness     Pt told his wife his toes feels "tingly" and his head "does not feel right". Pt denies any current complaints. Pt has hx of mild stroke in January of this year.      Review of patient's allergies indicates:   Allergen Reactions    Lisinopril Swelling         History of Present Illness     HPI    3/7/2023, 1:28 AM  History obtained from the wife and patient      History of Present Illness: Pravin Ferrari is a 82 y.o. male patient with a PMHx of CVA, cancer, HTN, and dementia who presents to the Emergency Department for evaluation of lightheadedness which onset around 10:30PM. Pt woke up and told his wife that he was lightheaded and his toes were tingling. The wife reports that pt had a stroke in January of this year and toe tingling was a symptom he was having after the stroke. Symptoms are resolved at this time. No mitigating or exacerbating factors reported. Patient denies any speech change, CP, weakness, SOB, nausea, vomiting, facial droop, and all other sxs at this time. No further complaints or concerns at this time.       Arrival mode: Personal vehicle     PCP: José Miguel Harrington MD        Past Medical History:  Past Medical History:   Diagnosis Date    Acute CVA (cerebrovascular accident) 1/11/2023    Cancer     prostate    Tribal (hard of hearing)     bilateral ears    Hypertension     pt states no       Past Surgical History:  Past Surgical History:   Procedure Laterality Date    COLONOSCOPY N/A 3/19/2019    Procedure: COLONOSCOPY;  Surgeon: Korin Cavazos MD;  Location: [facility];  Service: Endoscopy;  Laterality: N/A;    EYE SURGERY      HERNIA REPAIR      PROSTATE SURGERY           Family History:  Family History   Problem Relation Age of Onset    No Known Problems Mother     Aneurysm Father     Cerebral " "aneurysm Father     Prostate cancer Brother     Prostate cancer Son        Social History:  Social History     Tobacco Use    Smoking status: Never    Smokeless tobacco: Never   Substance and Sexual Activity    Alcohol use: Yes     Comment: drinks one beer every "once in a while during summer"    Drug use: No    Sexual activity: Not on file        Review of Systems     Review of Systems   Constitutional:  Negative for fever.   HENT:  Negative for sore throat.    Respiratory:  Negative for shortness of breath.    Cardiovascular:  Negative for chest pain.   Gastrointestinal:  Negative for nausea and vomiting.   Genitourinary:  Negative for dysuria.   Musculoskeletal:  Negative for back pain.   Skin:  Negative for rash.   Neurological:  Positive for light-headedness and numbness (Toes). Negative for facial asymmetry, speech difficulty and weakness.   Hematological:  Does not bruise/bleed easily.   All other systems reviewed and are negative.     Physical Exam     Initial Vitals [03/06/23 2351]   BP Pulse Resp Temp SpO2   (!) 149/80 68 18 98.4 °F (36.9 °C) 98 %      MAP       --          Physical Exam  Nursing Notes and Vital Signs Reviewed.  Constitutional: Patient is in no acute distress. Well-developed and well-nourished.  Head: Atraumatic. Normocephalic.  Eyes: PERRL. EOM intact. Conjunctivae are not pale. No scleral icterus.  ENT: Mucous membranes are moist. Oropharynx is clear and symmetric.    Neck: Supple. Full ROM. No lymphadenopathy.  Cardiovascular: Regular rate. Regular rhythm. No murmurs, rubs, or gallops.  Pulmonary/Chest: No respiratory distress. Clear to auscultation bilaterally. No wheezing or rales.  Abdominal: Soft and non-distended.  There is no tenderness.  No rebound, guarding, or rigidity.   Musculoskeletal: Moves all extremities. No obvious deformities. No edema.   Skin: Warm and dry.  Neurological:  Alert, awake, and appropriate.  Normal speech.  No acute focal neurological deficits are " "appreciated.  Psychiatric: Flat affect. Good eye contact. Appropriate in content.     ED Course   Procedures  ED Vital Signs:  Vitals:    03/06/23 2351 03/07/23 0132 03/07/23 0135 03/07/23 0138   BP: (!) 149/80 (!) 146/76 (!) 140/73 (!) 148/72   Pulse: 68 65 64 66   Resp: 18      Temp: 98.4 °F (36.9 °C)      TempSrc: Oral      SpO2: 98% 100% 97% 97%   Weight: 68.3 kg (150 lb 9.2 oz)      Height: 5' 10" (1.778 m)       03/07/23 0215 03/07/23 0220 03/07/23 0313   BP: (!) 173/79  (!) 159/73   Pulse: 60 60 67   Resp: 16  18   Temp:   98.2 °F (36.8 °C)   TempSrc:      SpO2: 99%  100%   Weight:      Height:          Abnormal Lab Results:  Labs Reviewed   CBC W/ AUTO DIFFERENTIAL - Abnormal; Notable for the following components:       Result Value    RBC 4.33 (*)     Hemoglobin 12.8 (*)     Hematocrit 39.3 (*)     Mono # 1.1 (*)     Eos # 0.8 (*)     Mono % 16.8 (*)     Eosinophil % 11.6 (*)     All other components within normal limits   COMPREHENSIVE METABOLIC PANEL - Abnormal; Notable for the following components:    CO2 22 (*)     Calcium 8.5 (*)     Albumin 3.4 (*)     All other components within normal limits   INFLUENZA A & B BY MOLECULAR   TROPONIN I   URINALYSIS, REFLEX TO URINE CULTURE    Narrative:     Specimen Source->Urine   SARS-COV-2 RNA AMPLIFICATION, QUAL        All Lab Results:  Results for orders placed or performed during the hospital encounter of 03/07/23   Influenza A & B by Molecular    Specimen: Nasopharyngeal Swab   Result Value Ref Range    Influenza A, Molecular Negative Negative    Influenza B, Molecular Negative Negative    Flu A & B Source Nasal swab    CBC auto differential   Result Value Ref Range    WBC 6.74 3.90 - 12.70 K/uL    RBC 4.33 (L) 4.60 - 6.20 M/uL    Hemoglobin 12.8 (L) 14.0 - 18.0 g/dL    Hematocrit 39.3 (L) 40.0 - 54.0 %    MCV 91 82 - 98 fL    MCH 29.6 27.0 - 31.0 pg    MCHC 32.6 32.0 - 36.0 g/dL    RDW 13.7 11.5 - 14.5 %    Platelets 208 150 - 450 K/uL    MPV 10.6 9.2 - 12.9 " fL    Immature Granulocytes 0.3 0.0 - 0.5 %    Gran # (ANC) 3.1 1.8 - 7.7 K/uL    Immature Grans (Abs) 0.02 0.00 - 0.04 K/uL    Lymph # 1.7 1.0 - 4.8 K/uL    Mono # 1.1 (H) 0.3 - 1.0 K/uL    Eos # 0.8 (H) 0.0 - 0.5 K/uL    Baso # 0.06 0.00 - 0.20 K/uL    nRBC 0 0 /100 WBC    Gran % 45.5 38.0 - 73.0 %    Lymph % 24.9 18.0 - 48.0 %    Mono % 16.8 (H) 4.0 - 15.0 %    Eosinophil % 11.6 (H) 0.0 - 8.0 %    Basophil % 0.9 0.0 - 1.9 %    Differential Method Automated    Comprehensive metabolic panel   Result Value Ref Range    Sodium 139 136 - 145 mmol/L    Potassium 4.2 3.5 - 5.1 mmol/L    Chloride 107 95 - 110 mmol/L    CO2 22 (L) 23 - 29 mmol/L    Glucose 96 70 - 110 mg/dL    BUN 21 8 - 23 mg/dL    Creatinine 0.9 0.5 - 1.4 mg/dL    Calcium 8.5 (L) 8.7 - 10.5 mg/dL    Total Protein 6.8 6.0 - 8.4 g/dL    Albumin 3.4 (L) 3.5 - 5.2 g/dL    Total Bilirubin 0.4 0.1 - 1.0 mg/dL    Alkaline Phosphatase 73 55 - 135 U/L    AST 25 10 - 40 U/L    ALT 27 10 - 44 U/L    Anion Gap 10 8 - 16 mmol/L    eGFR >60 >60 mL/min/1.73 m^2   Troponin I   Result Value Ref Range    Troponin I <0.006 0.000 - 0.026 ng/mL   Urinalysis, Reflex to Urine Culture Urine, Clean Catch    Specimen: Urine   Result Value Ref Range    Specimen UA Urine, Clean Catch     Color, UA Yellow Yellow, Straw, Vicky    Appearance, UA Clear Clear    pH, UA 7.0 5.0 - 8.0    Specific Gravity, UA 1.025 1.005 - 1.030    Protein, UA Negative Negative    Glucose, UA Negative Negative    Ketones, UA Negative Negative    Bilirubin (UA) Negative Negative    Occult Blood UA Negative Negative    Nitrite, UA Negative Negative    Urobilinogen, UA Negative <2.0 EU/dL    Leukocytes, UA Negative Negative   COVID-19 Rapid Screening   Result Value Ref Range    SARS-CoV-2 RNA, Amplification, Qual Negative Negative         Imaging Results:  Imaging Results              CT Head Without Contrast (In process)                          1:55 AM: Per STAT radiology, pt's CT Head without IV contrast  results:   No intracranial hemorrhage or evidence of large territorial infarction.  Global parenchymal volume loss with chronic microvascular ischemic changes  Carious erosions of the maxillary and mandibular teeth with associate periodontal disease. Recommend dental examination       The EKG was ordered, reviewed, and independently interpreted by the ED provider.  Interpretation time: 0009  Rate: 61 BPM  Rhythm: normal sinus rhythm  Interpretation: No ST or T wave abnormality. No STEMI.           The Emergency Provider reviewed the vital signs and test results, which are outlined above.     ED Discussion       2:48 AM: Reassessed pt at this time. Gave pt 500mL of normal saline as wife states that he does not eat much. CT was normal and labs are all within normal limits. Discussed with pt all pertinent ED information and results. Discussed pt dx and plan of tx. Gave pt all f/u and return to the ED instructions. All questions and concerns were addressed at this time. Pt expresses understanding of information and instructions, and is comfortable with plan to discharge. Pt is stable for discharge.    I discussed with patient and/or family/caretaker that evaluation in the ED does not suggest any emergent or life threatening medical conditions requiring immediate intervention beyond what was provided in the ED, and I believe patient is safe for discharge.  Regardless, an unremarkable evaluation in the ED does not preclude the development or presence of a serious of life threatening condition. As such, patient was instructed to return immediately for any worsening or change in current symptoms.         Medical Decision Making:   Initial Assessment:   Patient with dizziness for roughly 2 hours and tingling to both of his feet in the toes.    Differential Diagnosis:   CVA, vertigo, electrolyte imbalance, infection STEMI, non-STEMI, TIA  Clinical Tests:   Lab Tests: Ordered and Reviewed  The following lab test(s) were  unremarkable: CBC, CMP and Troponin       <> Summary of Lab: COVID was negative  Radiological Study: Ordered and Reviewed  Medical Tests: Ordered and Reviewed  ED Management:  Patient with a history of stroke in the past few months he is on aspirin and Plavix.  His stroke symptoms started with dizziness and tingling to his feet.  He had the same symptoms tonight.  However all of his symptoms have resolved.  His wife reports with a stroke he did have dysarthria and he did not have any of those symptoms today.  Symptoms resolved fully on their own.  Labs within normal limits EKG did not show any ST elevation or T-wave change that could have caused it dizziness.  No signs of arrhythmias.  Chest x-ray was normal there was no pneumonia or infectious etiology  CT head did not show any acute findings.  Monitor for few hours and he was discharged home in stable condition.  He was neurovascularly intact on discharge and family was very comfortable with him going home.         ED Medication(s):  Medications   sodium chloride 0.9% bolus 500 mL 500 mL (0 mLs Intravenous Stopped 3/7/23 0310)       Discharge Medication List as of 3/7/2023  2:57 AM           Follow-up Information       José Miguel Harrington MD In 1 day.    Specialty: Family Medicine  Contact information:  [id number]  [place] [address]  [phone]                                 Scribe Attestation:   Scribe #1: I performed the above scribed service and the documentation accurately describes the services I performed. I attest to the accuracy of the note.     Attending:   Physician Attestation Statement for Scribe #1: I, Roshan Hopkins Do, MD, personally performed the services described in this documentation, as scribed by Darek Gary, in my presence, and it is both accurate and complete.           Clinical Impression       ICD-10-CM ICD-9-CM   1. Weakness  R53.1 780.79   2. Numbness  R20.0 782.0       Disposition:   Disposition: Discharged  Condition:  Stable       Roshan Hopkins Do, MD  03/07/23 [id number]

## 2023-03-20 ENCOUNTER — TELEPHONE (OUTPATIENT)
Dept: CARDIOLOGY | Facility: CLINIC | Age: 83
End: 2023-03-20
Payer: MEDICARE

## 2023-03-20 NOTE — TELEPHONE ENCOUNTER
-DR Goldstein    -- had a stroke in January - mild then 2 weeks ago - dizzy - feet tingling - saw DR Patino - had another stroke    Cta on 4/5    Appt on 4/28 - pt aware - wanted DR Minaya    ---- Message from Corrie Beauchamp sent at 3/20/2023  2:24 PM CDT -----  Contact: Jenniferar/wife  Pt wife is calling in regards to her  having 2 strokes and needing an appt.Please call back at [phone]        Thanks  ZHANNA

## 2023-03-23 ENCOUNTER — TELEPHONE (OUTPATIENT)
Dept: CARDIOLOGY | Facility: CLINIC | Age: 83
End: 2023-03-23
Payer: MEDICARE

## 2023-03-23 NOTE — TELEPHONE ENCOUNTER
Spoke to granddaughter and r/s to 3/24 at [facility]----- Message from Melony Sanabria sent at 3/23/2023 11:59 AM CDT -----  Contact: bertha/ grandalba  Patients granddaughter is calling to speak with the nurse regarding appointment. Reports getting a notification for a sooner appointment for tomorrow and would like to get that appointment. Please give the patients granddaughter a call  back at [phone]  Thanks mike

## 2023-03-24 ENCOUNTER — OFFICE VISIT (OUTPATIENT)
Dept: CARDIOLOGY | Facility: CLINIC | Age: 83
End: 2023-03-24
Payer: MEDICARE

## 2023-03-24 VITALS
WEIGHT: 153.25 LBS | HEART RATE: 72 BPM | DIASTOLIC BLOOD PRESSURE: 70 MMHG | HEIGHT: 70 IN | SYSTOLIC BLOOD PRESSURE: 138 MMHG | OXYGEN SATURATION: 95 % | BODY MASS INDEX: 21.94 KG/M2

## 2023-03-24 DIAGNOSIS — R26.81 GAIT INSTABILITY: ICD-10-CM

## 2023-03-24 DIAGNOSIS — R47.1 DYSARTHRIA DUE TO ACUTE CEREBROVASCULAR ACCIDENT (CVA): ICD-10-CM

## 2023-03-24 DIAGNOSIS — I10 ESSENTIAL HYPERTENSION: Chronic | ICD-10-CM

## 2023-03-24 DIAGNOSIS — T46.4X5A ANGIOEDEMA DUE TO ANGIOTENSIN CONVERTING ENZYME INHIBITOR (ACE-I): ICD-10-CM

## 2023-03-24 DIAGNOSIS — T78.3XXA ANGIOEDEMA DUE TO ANGIOTENSIN CONVERTING ENZYME INHIBITOR (ACE-I): ICD-10-CM

## 2023-03-24 DIAGNOSIS — I63.9 DYSARTHRIA DUE TO ACUTE CEREBROVASCULAR ACCIDENT (CVA): ICD-10-CM

## 2023-03-24 DIAGNOSIS — I67.2 CEREBRAL ATHEROSCLEROSIS: Primary | Chronic | ICD-10-CM

## 2023-03-24 DIAGNOSIS — I63.9 ACUTE CVA (CEREBROVASCULAR ACCIDENT): ICD-10-CM

## 2023-03-24 DIAGNOSIS — F03.90 DEMENTIA, UNSPECIFIED DEMENTIA SEVERITY, UNSPECIFIED DEMENTIA TYPE, UNSPECIFIED WHETHER BEHAVIORAL, PSYCHOTIC, OR MOOD DISTURBANCE OR ANXIETY: ICD-10-CM

## 2023-03-24 DIAGNOSIS — I70.0 AORTIC ATHEROSCLEROSIS: Chronic | ICD-10-CM

## 2023-03-24 DIAGNOSIS — R53.1 WEAKNESS OF RIGHT SIDE OF BODY: ICD-10-CM

## 2023-03-24 PROCEDURE — 1159F PR MEDICATION LIST DOCUMENTED IN MEDICAL RECORD: ICD-10-PCS | Mod: CPTII,S$GLB,, | Performed by: INTERNAL MEDICINE

## 2023-03-24 PROCEDURE — 1126F AMNT PAIN NOTED NONE PRSNT: CPT | Mod: CPTII,S$GLB,, | Performed by: INTERNAL MEDICINE

## 2023-03-24 PROCEDURE — 3288F FALL RISK ASSESSMENT DOCD: CPT | Mod: CPTII,S$GLB,, | Performed by: INTERNAL MEDICINE

## 2023-03-24 PROCEDURE — 3288F PR FALLS RISK ASSESSMENT DOCUMENTED: ICD-10-PCS | Mod: CPTII,S$GLB,, | Performed by: INTERNAL MEDICINE

## 2023-03-24 PROCEDURE — 99204 OFFICE O/P NEW MOD 45 MIN: CPT | Mod: S$GLB,,, | Performed by: INTERNAL MEDICINE

## 2023-03-24 PROCEDURE — 1126F PR PAIN SEVERITY QUANTIFIED, NO PAIN PRESENT: ICD-10-PCS | Mod: CPTII,S$GLB,, | Performed by: INTERNAL MEDICINE

## 2023-03-24 PROCEDURE — 1101F PR PT FALLS ASSESS DOC 0-1 FALLS W/OUT INJ PAST YR: ICD-10-PCS | Mod: CPTII,S$GLB,, | Performed by: INTERNAL MEDICINE

## 2023-03-24 PROCEDURE — 3075F PR MOST RECENT SYSTOLIC BLOOD PRESS GE 130-139MM HG: ICD-10-PCS | Mod: CPTII,S$GLB,, | Performed by: INTERNAL MEDICINE

## 2023-03-24 PROCEDURE — 99204 PR OFFICE/OUTPT VISIT, NEW, LEVL IV, 45-59 MIN: ICD-10-PCS | Mod: S$GLB,,, | Performed by: INTERNAL MEDICINE

## 2023-03-24 PROCEDURE — 1159F MED LIST DOCD IN RCRD: CPT | Mod: CPTII,S$GLB,, | Performed by: INTERNAL MEDICINE

## 2023-03-24 PROCEDURE — 1160F RVW MEDS BY RX/DR IN RCRD: CPT | Mod: CPTII,S$GLB,, | Performed by: INTERNAL MEDICINE

## 2023-03-24 PROCEDURE — 1160F PR REVIEW ALL MEDS BY PRESCRIBER/CLIN PHARMACIST DOCUMENTED: ICD-10-PCS | Mod: CPTII,S$GLB,, | Performed by: INTERNAL MEDICINE

## 2023-03-24 PROCEDURE — 1101F PT FALLS ASSESS-DOCD LE1/YR: CPT | Mod: CPTII,S$GLB,, | Performed by: INTERNAL MEDICINE

## 2023-03-24 PROCEDURE — 99999 PR PBB SHADOW E&M-EST. PATIENT-LVL III: ICD-10-PCS | Mod: PBBFAC,,, | Performed by: INTERNAL MEDICINE

## 2023-03-24 PROCEDURE — 3078F DIAST BP <80 MM HG: CPT | Mod: CPTII,S$GLB,, | Performed by: INTERNAL MEDICINE

## 2023-03-24 PROCEDURE — 3078F PR MOST RECENT DIASTOLIC BLOOD PRESSURE < 80 MM HG: ICD-10-PCS | Mod: CPTII,S$GLB,, | Performed by: INTERNAL MEDICINE

## 2023-03-24 PROCEDURE — 3075F SYST BP GE 130 - 139MM HG: CPT | Mod: CPTII,S$GLB,, | Performed by: INTERNAL MEDICINE

## 2023-03-24 PROCEDURE — 99999 PR PBB SHADOW E&M-EST. PATIENT-LVL III: CPT | Mod: PBBFAC,,, | Performed by: INTERNAL MEDICINE

## 2023-03-24 NOTE — PROGRESS NOTES
"Subjective:   Patient ID:  Pravin Ferrari is a 82 y.o. male who presents for evaluation of No chief complaint on file.      HPI  An 83 yo male with dementia sustained a cav in left basal ganglion had rt sided weakness slurred speech actinG weird. And subsequently went to er again with numbness in leg and acting stRAnge no acute findings on ct. He follows with neuromedical dr CLARK.WAS TOLD NEEDS CARDIAC EVAL. HAS NO NEW NEURO FINDINGS HE HAS 4 WEEKS OF OT PT./ HE IS SCHEDULED FOR CTA  AT NEUROMEDICAL. HAS NO PALPITATIIN NO CHEST PAIN OR SHORTNESS OF BREATH  Past Medical History:   Diagnosis Date    Acute CVA (cerebrovascular accident) 1/11/2023    Cancer     prostate    [place] (hard of hearing)     bilateral ears    Hypertension     pt states no       Past Surgical History:   Procedure Laterality Date    COLONOSCOPY N/A 3/19/2019    Procedure: COLONOSCOPY;  Surgeon: Korin Cavazos MD;  Location: [facility];  Service: Endoscopy;  Laterality: N/A;    EYE SURGERY      HERNIA REPAIR      PROSTATE SURGERY         Social History     Tobacco Use    Smoking status: Never    Smokeless tobacco: Never   Substance Use Topics    Alcohol use: Yes     Comment: drinks one beer every "once in a while during summer"    Drug use: No       Family History   Problem Relation Age of Onset    No Known Problems Mother     Aneurysm Father     Cerebral aneurysm Father     Prostate cancer Brother     Prostate cancer Son        Current Outpatient Medications   Medication Sig    amLODIPine (NORVASC) 5 MG tablet Take 1 tablet (5 mg total) by mouth once daily.    aspirin (ECOTRIN) 81 MG EC tablet Take 81 mg by mouth once daily.    atorvastatin (LIPITOR) 40 MG tablet Take 1 tablet (40 mg total) by mouth once daily.    donepeziL (ARICEPT) 10 MG tablet Take 10 mg by mouth nightly.    memantine (NAMENDA) 10 MG Tab Take 10 mg by mouth 2 (two) times a day.    multivitamin (THERAGRAN) per tablet Take 1 tablet by mouth once daily.    clopidogreL (PLAVIX) " 75 mg tablet Take 1 tablet (75 mg total) by mouth once daily. for 21 days     No current facility-administered medications for this visit.     Current Outpatient Medications on File Prior to Visit   Medication Sig    amLODIPine (NORVASC) 5 MG tablet Take 1 tablet (5 mg total) by mouth once daily.    aspirin (ECOTRIN) 81 MG EC tablet Take 81 mg by mouth once daily.    atorvastatin (LIPITOR) 40 MG tablet Take 1 tablet (40 mg total) by mouth once daily.    donepeziL (ARICEPT) 10 MG tablet Take 10 mg by mouth nightly.    memantine (NAMENDA) 10 MG Tab Take 10 mg by mouth 2 (two) times a day.    multivitamin (THERAGRAN) per tablet Take 1 tablet by mouth once daily.    clopidogreL (PLAVIX) 75 mg tablet Take 1 tablet (75 mg total) by mouth once daily. for 21 days    [DISCONTINUED] hydroCHLOROthiazide (HYDRODIURIL) 25 MG tablet Take 1 tablet (25 mg total) by mouth once daily.     No current facility-administered medications on file prior to visit.       Review of patient's allergies indicates:   Allergen Reactions    Lisinopril Swelling       Review of Systems   Constitutional: Negative for malaise/fatigue.   Eyes:  Negative for blurred vision.   Cardiovascular:  Negative for chest pain, claudication, cyanosis, dyspnea on exertion, irregular heartbeat, leg swelling, near-syncope, orthopnea, palpitations and paroxysmal nocturnal dyspnea.   Respiratory:  Negative for cough, hemoptysis and shortness of breath.    Hematologic/Lymphatic: Negative for bleeding problem. Does not bruise/bleed easily.   Skin:  Negative for dry skin and itching.   Musculoskeletal:  Negative for falls, muscle weakness and myalgias.   Gastrointestinal:  Negative for abdominal pain, diarrhea, heartburn, hematemesis, hematochezia and melena.   Genitourinary:  Negative for flank pain and hematuria.   Neurological:  Positive for numbness and paresthesias. Negative for dizziness, focal weakness, headaches, light-headedness, seizures and weakness.  "  Psychiatric/Behavioral:  Negative for altered mental status and memory loss. The patient is not nervous/anxious.    Allergic/Immunologic: Negative for hives.     Objective:   Physical Exam  Vitals and nursing note reviewed.   Constitutional:       General: He is not in acute distress.     Appearance: He is well-developed. He is not diaphoretic.   HENT:      Head: Normocephalic and atraumatic.   Eyes:      General:         Right eye: No discharge.         Left eye: No discharge.      Pupils: Pupils are equal, round, and reactive to light.   Neck:      Thyroid: No thyromegaly.      Vascular: No JVD.   Cardiovascular:      Rate and Rhythm: Normal rate and regular rhythm.      Pulses: Normal pulses and intact distal pulses.      Heart sounds: Normal heart sounds. No murmur heard.    No friction rub. No gallop.   Pulmonary:      Effort: Pulmonary effort is normal. No respiratory distress.      Breath sounds: Normal breath sounds. No wheezing or rales.   Chest:      Chest wall: No tenderness.   Abdominal:      General: Bowel sounds are normal. There is no distension.      Palpations: Abdomen is soft.      Tenderness: There is no abdominal tenderness.   Musculoskeletal:         General: Normal range of motion.      Cervical back: Neck supple.   Skin:     General: Skin is warm and dry.      Findings: No erythema or rash.   Neurological:      General: No focal deficit present.      Mental Status: He is alert and oriented to person, place, and time.      Cranial Nerves: No cranial nerve deficit.   Psychiatric:         Mood and Affect: Mood normal.         Behavior: Behavior normal.     Vitals:    03/24/23 1403 03/24/23 1405   BP: (!) 142/68 138/70   BP Location: Left arm Right arm   Patient Position: Sitting Sitting   BP Method: Medium (Manual) Medium (Manual)   Pulse: 72    SpO2: 95%    Weight: 69.5 kg (153 lb 3.5 oz)    Height: 5' 10" (1.778 m)      Lab Results   Component Value Date    CHOL 176 12/07/2022    CHOL 165 " 12/06/2021    CHOL 175 10/07/2020     Body mass index is 21.98 kg/m².   Lab Results   Component Value Date    HGBA1C 5.4 01/11/2023      BMP  Lab Results   Component Value Date     03/07/2023    K 4.2 03/07/2023     03/07/2023    CO2 22 (L) 03/07/2023    BUN 21 03/07/2023    CREATININE 0.9 03/07/2023    CALCIUM 8.5 (L) 03/07/2023    ANIONGAP 10 03/07/2023    EGFRNORACEVR >60 03/07/2023      Lab Results   Component Value Date    HDL 54 12/07/2022    HDL 54 12/06/2021    HDL 61 10/07/2020     Lab Results   Component Value Date    LDLCALC 111.4 12/07/2022    LDLCALC 93.2 12/06/2021    LDLCALC 104.6 10/07/2020     Lab Results   Component Value Date    TRIG 53 12/07/2022    TRIG 89 12/06/2021    TRIG 47 10/07/2020     Lab Results   Component Value Date    CHOLHDL 30.7 12/07/2022    CHOLHDL 32.7 12/06/2021    CHOLHDL 34.9 10/07/2020       Chemistry        Component Value Date/Time     03/07/2023 0051    K 4.2 03/07/2023 0051     03/07/2023 0051    CO2 22 (L) 03/07/2023 0051    BUN 21 03/07/2023 0051    CREATININE 0.9 03/07/2023 0051    GLU 96 03/07/2023 0051        Component Value Date/Time    CALCIUM 8.5 (L) 03/07/2023 0051    ALKPHOS 73 03/07/2023 0051    AST 25 03/07/2023 0051    ALT 27 03/07/2023 0051    BILITOT 0.4 03/07/2023 0051    ESTGFRAFRICA >60 01/12/2022 0802    EGFRNONAA >60 01/12/2022 0802          Lab Results   Component Value Date    TSH 1.010 01/11/2023     Lab Results   Component Value Date    INR 1.0 01/12/2023    INR 1.0 01/11/2023     Lab Results   Component Value Date    WBC 6.74 03/07/2023    HGB 12.8 (L) 03/07/2023    HCT 39.3 (L) 03/07/2023    MCV 91 03/07/2023     03/07/2023     BNP  @LABRCNTIP(BNP,BNPTRIAGEBLO)@  CrCl cannot be calculated (Patient's most recent lab result is older than the maximum 7 days allowed.).    Summary    The left ventricle is normal in size with concentric hypertrophy and normal systolic function.  The estimated ejection fraction is  65%.  Grade I left ventricular diastolic dysfunction.  Normal right ventricular size with normal right ventricular systolic function.  Mild tricuspid regurgitation.  Mild mitral regurgitation.  Mild aortic regurgitation.  The estimated PA systolic pressure is 23 mmHg.    EKG NORMAL.  Assessment:     1. Cerebral atherosclerosis    2. Acute CVA (cerebrovascular accident)    3. Dementia, unspecified dementia severity, unspecified dementia type, unspecified whether behavioral, psychotic, or mood disturbance or anxiety    4. Dysarthria due to acute cerebrovascular accident (CVA)    5. Aortic atherosclerosis    6. Essential hypertension    7. Angioedema due to angiotensin converting enzyme inhibitor (ACE-I)    8. Gait instability    9. Weakness of right side of body    HTN REASONABLY CONTROLLED AFTER CVA CONTINUE THE SAME    CVA WILL NEED TO REVIEW MRI REPORT MAKE SURE NO MULTIPLE FOCI TO SUGGEST EMBOLIC EVENT OR AFIB IN ETIOLOGY WILL GET HOLTER.  HE IS ON APPROPRIATE THERAPY WITH ANTIPLATELET THERAPY AND STATINS.   HAS NUMBNESS OR TINGLING IN FEET HAS NORMAL PULSES HAS NO CLAUDICATION WILL OBSERVE.    Plan:   HOLTER  PHONE REVIEW.

## 2023-03-27 ENCOUNTER — HOSPITAL ENCOUNTER (OUTPATIENT)
Dept: CARDIOLOGY | Facility: HOSPITAL | Age: 83
Discharge: HOME | End: 2023-03-27
Attending: INTERNAL MEDICINE
Payer: MEDICARE

## 2023-03-27 ENCOUNTER — TELEPHONE (OUTPATIENT)
Dept: CARDIOLOGY | Facility: CLINIC | Age: 83
End: 2023-03-27
Payer: MEDICARE

## 2023-03-27 DIAGNOSIS — I67.2 CEREBRAL ATHEROSCLEROSIS: ICD-10-CM

## 2023-03-27 DIAGNOSIS — I67.2 CEREBRAL ATHEROSCLEROSIS: Chronic | ICD-10-CM

## 2023-03-27 DIAGNOSIS — I63.9 ACUTE CVA (CEREBROVASCULAR ACCIDENT): ICD-10-CM

## 2023-03-27 DIAGNOSIS — I70.0 AORTIC ATHEROSCLEROSIS: ICD-10-CM

## 2023-03-27 DIAGNOSIS — R53.1 WEAKNESS OF RIGHT SIDE OF BODY: ICD-10-CM

## 2023-03-27 DIAGNOSIS — I70.0 AORTIC ATHEROSCLEROSIS: Chronic | ICD-10-CM

## 2023-03-27 PROCEDURE — 93225 XTRNL ECG REC<48 HRS REC: CPT

## 2023-03-27 NOTE — TELEPHONE ENCOUNTER
Spoke with pt's wife and scheduled a f/u appt for 05/08/23 at 04:40PM. She stated understanding with no questions or concerns

## 2023-03-28 ENCOUNTER — TELEPHONE (OUTPATIENT)
Dept: CARDIOLOGY | Facility: CLINIC | Age: 83
End: 2023-03-28
Payer: MEDICARE

## 2023-03-28 NOTE — TELEPHONE ENCOUNTER
Spoke to patients wife and informed her we will not bill for holter  per Dr. Minaya and we will observe for now. Spec proc (R) also informed----- Message from Mark Minaya MD sent at 3/27/2023  5:01 PM CDT -----  Contact: wife [phone]  Will observe him for now  ----- Message -----  From: Jonna Easley LPN  Sent: 3/27/2023   4:52 PM CDT  To: Mark Minaya MD    She said about 3-4 hours  ----- Message -----  From: Mark Minaya MD  Sent: 3/27/2023   4:46 PM CDT  To: Jonna Easley LPN    How long did it stay on.  ----- Message -----  From: Jonna Easley LPN  Sent: 3/27/2023   3:04 PM CDT  To: Mark Minaya MD    Patient's wife says he took the monitor off due to his dementia and not knowing what it was on him. The only way she can keep it on him is to literally follow him arourd the house for 2 days. She will drop the monitor back at [facility] today but wants to know what is the next step and if he needs to keep the follow up in May with you   ----- Message -----  From: Velia Heredia  Sent: 3/27/2023   1:23 PM CDT  To: Marimar ROSARIO Staff    Patients wife called in this afternoon stating that her  got a heart monitor put on today but he has taken it off. His wife states that he has dementia and he may not be able to wear it being that he does not know what it is. Please call back [phone]. Thanks tpw

## 2023-03-30 DIAGNOSIS — I67.2 CEREBRAL ATHEROSCLEROSIS: ICD-10-CM

## 2023-03-30 DIAGNOSIS — I63.9 ACUTE CVA (CEREBROVASCULAR ACCIDENT): Primary | ICD-10-CM

## 2023-03-30 DIAGNOSIS — R53.1 WEAKNESS OF RIGHT SIDE OF BODY: ICD-10-CM

## 2023-03-30 DIAGNOSIS — I70.0 AORTIC ATHEROSCLEROSIS: ICD-10-CM

## 2023-04-17 PROBLEM — I63.9 ACUTE CVA (CEREBROVASCULAR ACCIDENT): Status: RESOLVED | Noted: 2023-01-11 | Resolved: 2023-04-17

## 2023-04-24 ENCOUNTER — LAB VISIT (OUTPATIENT)
Dept: LAB | Facility: HOSPITAL | Age: 83
End: 2023-04-24
Attending: FAMILY MEDICINE
Payer: MEDICARE

## 2023-04-24 ENCOUNTER — OFFICE VISIT (OUTPATIENT)
Dept: INTERNAL MEDICINE | Facility: CLINIC | Age: 83
End: 2023-04-24
Payer: MEDICARE

## 2023-04-24 VITALS
TEMPERATURE: 97 F | BODY MASS INDEX: 21.78 KG/M2 | OXYGEN SATURATION: 96 % | HEIGHT: 70 IN | WEIGHT: 152.13 LBS | HEART RATE: 83 BPM | DIASTOLIC BLOOD PRESSURE: 72 MMHG | SYSTOLIC BLOOD PRESSURE: 124 MMHG

## 2023-04-24 DIAGNOSIS — I70.0 AORTIC ATHEROSCLEROSIS: ICD-10-CM

## 2023-04-24 DIAGNOSIS — I67.2 CEREBRAL ATHEROSCLEROSIS: ICD-10-CM

## 2023-04-24 DIAGNOSIS — I67.2 CEREBRAL ATHEROSCLEROSIS: Primary | ICD-10-CM

## 2023-04-24 DIAGNOSIS — F03.A0 MILD DEMENTIA WITHOUT BEHAVIORAL DISTURBANCE, PSYCHOTIC DISTURBANCE, MOOD DISTURBANCE, OR ANXIETY, UNSPECIFIED DEMENTIA TYPE: ICD-10-CM

## 2023-04-24 DIAGNOSIS — Z86.73 HISTORY OF CVA (CEREBROVASCULAR ACCIDENT) WITHOUT RESIDUAL DEFICITS: ICD-10-CM

## 2023-04-24 DIAGNOSIS — I10 ESSENTIAL HYPERTENSION: ICD-10-CM

## 2023-04-24 PROBLEM — R53.1 WEAKNESS OF RIGHT SIDE OF BODY: Status: RESOLVED | Noted: 2023-01-23 | Resolved: 2023-04-24

## 2023-04-24 PROBLEM — R47.1 DYSARTHRIA DUE TO ACUTE CEREBROVASCULAR ACCIDENT (CVA): Status: RESOLVED | Noted: 2023-01-23 | Resolved: 2023-04-24

## 2023-04-24 PROBLEM — R26.81 GAIT INSTABILITY: Status: RESOLVED | Noted: 2023-01-23 | Resolved: 2023-04-24

## 2023-04-24 PROBLEM — I63.9 DYSARTHRIA DUE TO ACUTE CEREBROVASCULAR ACCIDENT (CVA): Status: RESOLVED | Noted: 2023-01-23 | Resolved: 2023-04-24

## 2023-04-24 LAB
CHOLEST SERPL-MCNC: 111 MG/DL (ref 120–199)
CHOLEST/HDLC SERPL: 2.1 {RATIO} (ref 2–5)
HDLC SERPL-MCNC: 53 MG/DL (ref 40–75)
HDLC SERPL: 47.7 % (ref 20–50)
LDLC SERPL CALC-MCNC: 48.4 MG/DL (ref 63–159)
NONHDLC SERPL-MCNC: 58 MG/DL
TRIGL SERPL-MCNC: 48 MG/DL (ref 30–150)

## 2023-04-24 PROCEDURE — 36415 COLL VENOUS BLD VENIPUNCTURE: CPT | Performed by: FAMILY MEDICINE

## 2023-04-24 PROCEDURE — 99214 PR OFFICE/OUTPT VISIT, EST, LEVL IV, 30-39 MIN: ICD-10-PCS | Mod: S$GLB,,, | Performed by: FAMILY MEDICINE

## 2023-04-24 PROCEDURE — 80061 LIPID PANEL: CPT | Performed by: FAMILY MEDICINE

## 2023-04-24 PROCEDURE — 1160F RVW MEDS BY RX/DR IN RCRD: CPT | Mod: CPTII,S$GLB,, | Performed by: FAMILY MEDICINE

## 2023-04-24 PROCEDURE — 3074F PR MOST RECENT SYSTOLIC BLOOD PRESSURE < 130 MM HG: ICD-10-PCS | Mod: CPTII,S$GLB,, | Performed by: FAMILY MEDICINE

## 2023-04-24 PROCEDURE — 1159F MED LIST DOCD IN RCRD: CPT | Mod: CPTII,S$GLB,, | Performed by: FAMILY MEDICINE

## 2023-04-24 PROCEDURE — 1126F PR PAIN SEVERITY QUANTIFIED, NO PAIN PRESENT: ICD-10-PCS | Mod: CPTII,S$GLB,, | Performed by: FAMILY MEDICINE

## 2023-04-24 PROCEDURE — 1160F PR REVIEW ALL MEDS BY PRESCRIBER/CLIN PHARMACIST DOCUMENTED: ICD-10-PCS | Mod: CPTII,S$GLB,, | Performed by: FAMILY MEDICINE

## 2023-04-24 PROCEDURE — 1126F AMNT PAIN NOTED NONE PRSNT: CPT | Mod: CPTII,S$GLB,, | Performed by: FAMILY MEDICINE

## 2023-04-24 PROCEDURE — 99999 PR PBB SHADOW E&M-EST. PATIENT-LVL III: ICD-10-PCS | Mod: PBBFAC,,, | Performed by: FAMILY MEDICINE

## 2023-04-24 PROCEDURE — 3078F DIAST BP <80 MM HG: CPT | Mod: CPTII,S$GLB,, | Performed by: FAMILY MEDICINE

## 2023-04-24 PROCEDURE — 99214 OFFICE O/P EST MOD 30 MIN: CPT | Mod: S$GLB,,, | Performed by: FAMILY MEDICINE

## 2023-04-24 PROCEDURE — 3078F PR MOST RECENT DIASTOLIC BLOOD PRESSURE < 80 MM HG: ICD-10-PCS | Mod: CPTII,S$GLB,, | Performed by: FAMILY MEDICINE

## 2023-04-24 PROCEDURE — 99999 PR PBB SHADOW E&M-EST. PATIENT-LVL III: CPT | Mod: PBBFAC,,, | Performed by: FAMILY MEDICINE

## 2023-04-24 PROCEDURE — 3074F SYST BP LT 130 MM HG: CPT | Mod: CPTII,S$GLB,, | Performed by: FAMILY MEDICINE

## 2023-04-24 PROCEDURE — 1159F PR MEDICATION LIST DOCUMENTED IN MEDICAL RECORD: ICD-10-PCS | Mod: CPTII,S$GLB,, | Performed by: FAMILY MEDICINE

## 2023-04-24 RX ORDER — CLOPIDOGREL BISULFATE 75 MG/1
75 TABLET ORAL DAILY
Qty: 90 TABLET | Refills: 3 | Status: SHIPPED | OUTPATIENT
Start: 2023-04-24 | End: 2024-04-23

## 2023-04-24 NOTE — PROGRESS NOTES
Subjective:   Patient ID: Pravin Ferrari is a 82 y.o. male.  Chief Complaint:  Hospital Follow Up    Presents for 3 month follow-up   Last visit January 2023 for hospital discharge follow-up after acute CVA    - Acute CVA (cerebrovascular accident)  - Cerebral atherosclerosis  - Aortic atherosclerosis   Weakness of right side of body  - Gait instability  - Dysarthria due to acute cerebrovascular accident (CVA)  Since last visit compliant with aspirin 81 mg daily, Plavix 75 mg daily, and Lipitor 40 mg daily.    Completed PT, OT, and ST.    Previous dysarthria, right-sided weakness, and gait instability resolved/improved with no permanent deficit from prev CVA baseline  Unfortunately had repeat episode of similar symptoms in March 2023.  These resolved spontaneously within 12-24 hours   Imaging in the ER at that time was without change  Hydrated with IV fluids   Discharged home   At follow-up visit with Neurology, repeat imaging showed new CVA finding.    Has had CTA but results are pending   Did see Cardiology who recommended Holter monitor to rule out any underlying atrial fibrillation, but due to dementia patient not able comply with wearing a monitor.  Has not had any recurrent episodes since March 2023   CBC and CMP were stable in ER   Needs repeat lipid panel to see if LDL at goal less than 70    - Essential hypertension  On amlodipine 5 mg daily.  Reports compliance.  Denies side effects.  Blood pressure controlled.     - Dementia  Stable on Namenda and Aricept   No significant decline since last visit   No agitation or psychosis behaviors    No new complaints today     Review of Systems   Constitutional:  Negative for chills, fatigue and fever.   HENT:  Positive for hearing loss. Negative for congestion, dental problem, ear discharge, ear pain, postnasal drip, rhinorrhea, sinus pressure, sinus pain, sore throat and trouble swallowing.    Eyes:  Negative for pain, redness and visual disturbance.   Respiratory:   "Negative for cough, chest tightness, shortness of breath and wheezing.    Cardiovascular:  Negative for chest pain, palpitations and leg swelling.   Gastrointestinal:  Negative for abdominal pain, constipation, diarrhea, nausea and vomiting.   Endocrine: Negative for polydipsia, polyphagia and polyuria.   Genitourinary:  Negative for difficulty urinating, dysuria, flank pain, frequency, hematuria and urgency.   Musculoskeletal:  Negative for gait problem, myalgias and neck pain.   Skin:  Negative for rash.   Neurological:  Negative for dizziness, tremors, seizures, syncope, facial asymmetry, speech difficulty, weakness, light-headedness, numbness and headaches.   Hematological:  Negative for adenopathy.   Psychiatric/Behavioral:  Positive for confusion. Negative for agitation, behavioral problems, decreased concentration, dysphoric mood, hallucinations and sleep disturbance. The patient is not nervous/anxious and is not hyperactive.      Objective:   /72 (BP Location: Left arm, Patient Position: Sitting, BP Method: Small (Manual))   Pulse 83   Temp 96.5 °F (35.8 °C) (Tympanic)   Ht 5' 10" (1.778 m)   Wt 69 kg (152 lb 1.9 oz)   SpO2 96%   BMI 21.83 kg/m²     Physical Exam  Vitals and nursing note reviewed.   Constitutional:       Appearance: Normal appearance. He is well-developed and normal weight.   HENT:      Right Ear: Decreased hearing noted.      Left Ear: Decreased hearing noted.   Neck:      Thyroid: No thyroid mass, thyromegaly or thyroid tenderness.      Vascular: No carotid bruit.   Cardiovascular:      Rate and Rhythm: Normal rate and regular rhythm.      Heart sounds: Normal heart sounds. No murmur heard.  Pulmonary:      Effort: Pulmonary effort is normal.      Breath sounds: Normal breath sounds and air entry.   Abdominal:      General: There is no distension.      Palpations: Abdomen is soft.      Tenderness: There is no abdominal tenderness.   Skin:     Findings: No rash.   Neurological: "      Cranial Nerves: No dysarthria or facial asymmetry.      Gait: Gait normal.   Psychiatric:         Mood and Affect: Mood and affect normal.     Assessment:       ICD-10-CM ICD-9-CM   1. Cerebral atherosclerosis  I67.2 437.0   2. Aortic atherosclerosis  I70.0 440.0   3. History of CVA (cerebrovascular accident) without residual deficits  Z86.73 V12.54   4. Essential hypertension  I10 401.9   5. Mild dementia without behavioral disturbance, psychotic disturbance, mood disturbance, or anxiety, unspecified dementia type  F03.A0 294.10     Plan:   Cerebral atherosclerosis  Aortic atherosclerosis  History of CVA (cerebrovascular accident) without residual deficits  -     Lipid Panel; Future; Expected date: 04/24/2023  -     clopidogreL (PLAVIX) 75 mg tablet; Take 1 tablet (75 mg total) by mouth once daily.  Dispense: 90 tablet; Refill: 3  Stable.   Deficits from previous CVA resolved after therapy  Continue Plavix 75 mg daily  Continue aspirin 81 mg daily   Check lipid panel today.    If LDL less than 70, continue Lipitor 40 mg daily  If LDL greater than 70, increase Lipitor 80 mg daily   Follow-up neurology regarding CTA to make sure no further vascular intervention needed     Essential hypertension  Controlled.  Stable.  Asymptomatic.  BP at goal.    Continue amlodipine 5 mg daily     Mild dementia without behavioral disturbance, psychotic disturbance, mood disturbance, or anxiety, unspecified dementia type  Stable   Continue Aricept Namenda   Follow-up neurology as scheduled     Return to clinic 6 months for annual physical exam or sooner if needed

## 2023-05-04 NOTE — PROGRESS NOTES
Pravin Ferrari was seen 03/13/2019 for an audiological evaluation.  Patient complains of bilateral gradual hearing loss with intermittent tinnitus. He has a history of noise exposure.     Results reveal a mild-to-profound sensorineural hearing loss 250-8000 Hz for the right ear, and  mild-to-profound sensorineural hearing loss 250-8000 Hz for the left ear.   Speech Reception Thresholds were  45 dBHL for the right ear and 40 dBHL for the left ear.   Word recognition scores were good for the right ear and good for the left ear.   Tympanograms were Type A, normal for the right ear and Type A, normal for the left ear.    Patient was counseled on the above findings. Cerumen in the left ear will be removed today by PA.    Recommendations include:    1.  ENT followup  2.  Hearing aid consult (information was given to patient at today's visit), [facility]  3.  Wear hearing protective devices around loud noise  4.  Annual audiograms           59 y/o F with PMH A fib s/p ablation, CHF, COPD on 2L O2 nightly, schizoaffective disorder, neurogenic bladder s/p suprapubic catheter, b/l pinning for SCFE 1974, Right and left TKA, spinal stenosis and L4-L5 spondylolisthesis, lumbar radiculopathy s/p L4-L6 lumbar fusion, chronic hyponatremia, adrenal insufficiency, anemia, anxiety, aspiration PNA, C. diff, duodenal ulcer, empyema, endometriosis, GI bleed, IBS, hypothyroidism, MRSA, migraines, narcolepsy, OA, orthostatic hypotension, PCOS, peripheral neuropathy, septic embolism, sigmoid volvulus, MERCEDEZ, hypoglycemia since Ady-en-y, colonic intertia, tardive dyskinesia, rotator cuff tear, left knee injury s/p I&D was admitted on 5/1 for worsening shortness of breath. Pt state that she is unable to take deep breaths and any exertion causes her to feel short of breath. She has been using her nebulizers at home which have been providing relief. Over the last 2 days she has been coughing up thick brown sputum with blood in it. She reports chills, low grade fevers on the day PTA to 100.1F. Her suprapubic catheter was changed on the day of admission and a UCx was collected. She reports discharge around the suprapubic site. Reports chest tightness, left arm pain d/t rotator cuff tear. She was supposed to have an ostomy placed for her colonic inertia but she was not cleared for surgery per pulmonology and cardiology. Of note, pt is scheduled to get Gamma plex and IV iron through her port. In ER she received ceftriaxone and azithromycin.     1.Acute on chronic respiratory failure. Probable lower lobes pneumonia ?aspiration. ?underlying CHF exacerbation. Neurogenic bladder with suprapubic tube. Possible UTI. Urinary bladder spasms. Colitis. Prior CDAD. Allergy to PCN, vancomycin, bactrim, zosyn.   -respiratory frail, but improving  -BC x 2 noted  -difficult case in shakir of multiple complaints, complicated medical history and multiple abx allergies  -on ceftriaxone 2 gm IV qd and azithromycin 500 mg IV qd # 3  -tolerating abx well so far; no side effects noted  -continue abx coverage   -f/u cultures  -monitor temps  -f/u CBC  -supportive care  2. Other issues:   -care per medicine

## 2023-06-15 ENCOUNTER — PATIENT MESSAGE (OUTPATIENT)
Dept: INTERNAL MEDICINE | Facility: CLINIC | Age: 83
End: 2023-06-15
Payer: MEDICARE

## 2023-06-15 ENCOUNTER — TELEPHONE (OUTPATIENT)
Dept: SPORTS MEDICINE | Facility: CLINIC | Age: 83
End: 2023-06-15
Payer: MEDICARE

## 2023-06-15 NOTE — TELEPHONE ENCOUNTER
Contacted patient's wife and let her know her  needs to get scheduled with poditary as its more appropriate for her husbands concern

## 2023-06-16 ENCOUNTER — OFFICE VISIT (OUTPATIENT)
Dept: PODIATRY | Facility: CLINIC | Age: 83
End: 2023-06-16
Payer: MEDICARE

## 2023-06-16 VITALS
BODY MASS INDEX: 21.78 KG/M2 | RESPIRATION RATE: 18 BRPM | SYSTOLIC BLOOD PRESSURE: 124 MMHG | DIASTOLIC BLOOD PRESSURE: 72 MMHG | OXYGEN SATURATION: 98 % | WEIGHT: 152.13 LBS | HEIGHT: 70 IN

## 2023-06-16 DIAGNOSIS — Q82.8 POROKERATOSIS: ICD-10-CM

## 2023-06-16 DIAGNOSIS — M79.671 RIGHT FOOT PAIN: Primary | ICD-10-CM

## 2023-06-16 PROCEDURE — 99203 OFFICE O/P NEW LOW 30 MIN: CPT | Mod: S$GLB,,, | Performed by: PODIATRIST

## 2023-06-16 PROCEDURE — 1101F PT FALLS ASSESS-DOCD LE1/YR: CPT | Mod: CPTII,S$GLB,, | Performed by: PODIATRIST

## 2023-06-16 PROCEDURE — 1101F PR PT FALLS ASSESS DOC 0-1 FALLS W/OUT INJ PAST YR: ICD-10-PCS | Mod: CPTII,S$GLB,, | Performed by: PODIATRIST

## 2023-06-16 PROCEDURE — 3074F PR MOST RECENT SYSTOLIC BLOOD PRESSURE < 130 MM HG: ICD-10-PCS | Mod: CPTII,S$GLB,, | Performed by: PODIATRIST

## 2023-06-16 PROCEDURE — 3078F DIAST BP <80 MM HG: CPT | Mod: CPTII,S$GLB,, | Performed by: PODIATRIST

## 2023-06-16 PROCEDURE — 3288F FALL RISK ASSESSMENT DOCD: CPT | Mod: CPTII,S$GLB,, | Performed by: PODIATRIST

## 2023-06-16 PROCEDURE — 1160F RVW MEDS BY RX/DR IN RCRD: CPT | Mod: CPTII,S$GLB,, | Performed by: PODIATRIST

## 2023-06-16 PROCEDURE — 1126F AMNT PAIN NOTED NONE PRSNT: CPT | Mod: CPTII,S$GLB,, | Performed by: PODIATRIST

## 2023-06-16 PROCEDURE — 3288F PR FALLS RISK ASSESSMENT DOCUMENTED: ICD-10-PCS | Mod: CPTII,S$GLB,, | Performed by: PODIATRIST

## 2023-06-16 PROCEDURE — 99203 PR OFFICE/OUTPT VISIT, NEW, LEVL III, 30-44 MIN: ICD-10-PCS | Mod: S$GLB,,, | Performed by: PODIATRIST

## 2023-06-16 PROCEDURE — 99999 PR PBB SHADOW E&M-EST. PATIENT-LVL III: ICD-10-PCS | Mod: PBBFAC,,, | Performed by: PODIATRIST

## 2023-06-16 PROCEDURE — 1159F PR MEDICATION LIST DOCUMENTED IN MEDICAL RECORD: ICD-10-PCS | Mod: CPTII,S$GLB,, | Performed by: PODIATRIST

## 2023-06-16 PROCEDURE — 99999 PR PBB SHADOW E&M-EST. PATIENT-LVL III: CPT | Mod: PBBFAC,,, | Performed by: PODIATRIST

## 2023-06-16 PROCEDURE — 1159F MED LIST DOCD IN RCRD: CPT | Mod: CPTII,S$GLB,, | Performed by: PODIATRIST

## 2023-06-16 PROCEDURE — 1160F PR REVIEW ALL MEDS BY PRESCRIBER/CLIN PHARMACIST DOCUMENTED: ICD-10-PCS | Mod: CPTII,S$GLB,, | Performed by: PODIATRIST

## 2023-06-16 PROCEDURE — 3074F SYST BP LT 130 MM HG: CPT | Mod: CPTII,S$GLB,, | Performed by: PODIATRIST

## 2023-06-16 PROCEDURE — 3078F PR MOST RECENT DIASTOLIC BLOOD PRESSURE < 80 MM HG: ICD-10-PCS | Mod: CPTII,S$GLB,, | Performed by: PODIATRIST

## 2023-06-16 PROCEDURE — 1126F PR PAIN SEVERITY QUANTIFIED, NO PAIN PRESENT: ICD-10-PCS | Mod: CPTII,S$GLB,, | Performed by: PODIATRIST

## 2023-06-16 NOTE — PROGRESS NOTES
"  Subjective:       Patient ID: Pravin Ferrari is a 82 y.o. male.    Chief Complaint: Foot Pain (Right foot pain)    HPI: Pravin Ferrari presents to the office today, with areas of concern to the plantar aspect of the right left foot.  States that it feels as if he is walking on rocks.  Denies any puncture wounds or injuries.  States this pain is been ongoing for some time without significant improvement.  No specific memory of walking barefoot or causing injury.  States visualization of new developed lesions to the foot.  No signs of acute infection.    Review of patient's allergies indicates:   Allergen Reactions    Lisinopril Swelling       Past Medical History:   Diagnosis Date    Acute CVA (cerebrovascular accident) 1/11/2023    Cancer     prostate    [place] (hard of hearing)     bilateral ears    Hypertension     pt states no       Family History   Problem Relation Age of Onset    No Known Problems Mother     Aneurysm Father     Cerebral aneurysm Father     Prostate cancer Brother     Prostate cancer Son        Social History     Socioeconomic History    Marital status:    Tobacco Use    Smoking status: Never    Smokeless tobacco: Never   Substance and Sexual Activity    Alcohol use: Yes     Comment: drinks one beer every "once in a while during summer"    Drug use: No       Past Surgical History:   Procedure Laterality Date    COLONOSCOPY N/A 3/19/2019    Procedure: COLONOSCOPY;  Surgeon: Korin Cavazos MD;  Location: [facility];  Service: Endoscopy;  Laterality: N/A;    EYE SURGERY      HERNIA REPAIR      PROSTATE SURGERY         Review of Systems       Objective:   /72   Resp 18   Ht 5' 10" (1.778 m)   Wt 69 kg (152 lb 1.9 oz)   SpO2 98%   BMI 21.83 kg/m²     CT Head Without Contrast  Narrative: EXAMINATION:  CT HEAD WITHOUT CONTRAST    CLINICAL HISTORY:  Stroke, follow up;    TECHNIQUE:  Axial CT images obtained throughout the head without intravenous " contrast.    COMPARISON:  01/11/2023    FINDINGS:  Negative for acute hemorrhage, mass effect, extraaxial collection, hydrocephalus.    There is good gray white matter differentiation.    Stable moderate to marked chronic small vessel ischemic changes throughout the white matter.  Calcified plaque skull-base vasculature.    The calvarium is unremarkable with no fractures.  Impression: Negative for acute intracranial abnormality.    All CT scans at this facility are performed  using dose modulation techniques as appropriate to performed exam including the following:  automated exposure control; adjustment of mA and/or kV according to the patients size (this includes techniques or standardized protocols for targeted exams where dose is matched to indication/reason for exam: i.e. extremities or head);  iterative reconstruction technique.    Electronically signed by: Teo Turner MD  Date:    03/07/2023  Time:    07:22       Physical Exam   LOWER EXTREMITY PHYSICAL EXAMINATION    Vascular:  The right dorsalis pedis pulse 2/4 and the right posterior tibial pulse 2/4.  The left dorsalis pedis pulse 2/4 and posterior tibial pulse on the left is 2/4.  Capillary refill is intact.  Pedal hair growth intact    Musculoskeletal: Manual Muscle Testing is 5/5 with dorsiflexion, plantar flexion, abduction, and adduction.   There is normal range of motion in the forefoot, hindfoot, and Ankle joint.   Pain on direct palpation overlying the lesion to the 5th metatarsal base    Dermatological:  Skin is supple and moist.  There there is 1 lesion present to the plantar aspect of the foot which have a resemblance to a plantar porokeratosis.  Centralize core is noted.  There is no acute signs of infection.  No signs of underlying ulceration.  There is no absence of skin line.  No obvious capillary hemorrhaging.        Assessment:     1. Right foot pain    2. Porokeratosis        Plan:     Right foot pain    Porokeratosis      We discussed  the etiology and pathology associated with acquired plantar porokeratosis.  We discussed the importance of removing the centralize core and alleviating pain discomfort.  We also discussed utilizing a pumice stone at home to reduce callus formation and subsequent recurrence of this area.  Recommend to apply hydrating creams and lotions this area daily to ensure that there is no subsequent buildup.    Porokeratotic skin formation, as outlined within the examination portion of this note, is surgically debrided with sharp #10/#15 blade, to alleviate discomfort with weight bearing and ambulation, and to lessen the possibility of skin complications, e.g., ulceration due to pressure. No ulceration(s) is are noted with/post debridement. The lesion is completed healed and resolved. No evidence of infection.         Future Appointments   Date Time Provider Department Center   7/13/2023  1:20 PM Mark Minaya MD [facility]   10/24/2023  8:40 AM José Miguel Harrington MD [facility]

## 2023-07-10 ENCOUNTER — OFFICE VISIT (OUTPATIENT)
Dept: OTOLARYNGOLOGY | Facility: CLINIC | Age: 83
End: 2023-07-10
Payer: MEDICARE

## 2023-07-10 VITALS — WEIGHT: 151.44 LBS | HEIGHT: 70 IN | TEMPERATURE: 98 F | BODY MASS INDEX: 21.68 KG/M2

## 2023-07-10 DIAGNOSIS — H61.23 BILATERAL IMPACTED CERUMEN: Primary | ICD-10-CM

## 2023-07-10 PROCEDURE — 99999 PR PBB SHADOW E&M-EST. PATIENT-LVL III: CPT | Mod: PBBFAC,,, | Performed by: PHYSICIAN ASSISTANT

## 2023-07-10 PROCEDURE — 69210 REMOVE IMPACTED EAR WAX UNI: CPT | Mod: S$GLB,,, | Performed by: PHYSICIAN ASSISTANT

## 2023-07-10 PROCEDURE — 99499 NO LOS: ICD-10-PCS | Mod: S$GLB,,, | Performed by: PHYSICIAN ASSISTANT

## 2023-07-10 PROCEDURE — 99499 UNLISTED E&M SERVICE: CPT | Mod: S$GLB,,, | Performed by: PHYSICIAN ASSISTANT

## 2023-07-10 PROCEDURE — 99999 PR PBB SHADOW E&M-EST. PATIENT-LVL III: ICD-10-PCS | Mod: PBBFAC,,, | Performed by: PHYSICIAN ASSISTANT

## 2023-07-10 PROCEDURE — 69210 PR REMOVAL IMPACTED CERUMEN REQUIRING INSTRUMENTATION, UNILATERAL: ICD-10-PCS | Mod: S$GLB,,, | Performed by: PHYSICIAN ASSISTANT

## 2023-07-10 NOTE — PROGRESS NOTES
"Subjective:   Patient ID: Pravin Ferrari is a 82 y.o. male.    Chief Complaint: Cerumen Impaction (Ear cleaning)    Patient is here to see me today for evaluation of a possible wax impaction in bilateral ears.  He has complaints of hearing loss in the affected ears, but denies pain or drainage.  This has been an issue in the past.  The patient has not been using any sort of ear drop to soften the wax.     Review of patient's allergies indicates:   Allergen Reactions    Lisinopril Swelling           Review of Systems   HENT:  Positive for hearing loss.        Objective:   Temp 98.1 °F (36.7 °C) (Temporal)   Ht 5' 10" (1.778 m)   Wt 68.7 kg (151 lb 7.3 oz)   BMI 21.73 kg/m²     Physical Exam  HENT:      Right Ear: There is impacted cerumen.      Left Ear: There is impacted cerumen.        Procedure Note    CHIEF COMPLAINT:  Cerumen Impaction    Description:  The patient was seated in an exam chair.  An ear speculum was placed in the right EAC and was examined under the microscope.  Suction and/or loop curettes were used to remove a large cerumen impaction.  The tympanic membrane was visualized and was normal in appearance.  The procedure was repeated on the left side in a similar fashion.  The TM was intact and normal on this side as well.  The patient tolerated the procedure well.     Assessment:     1. Bilateral impacted cerumen        Plan:     Bilateral impacted cerumen       Cerumen impaction:  Removed today without difficulty.  I would recommend the use of a wax softening drop, either over the counter Debrox or mineral oil, on a weekly basis.  I also instructed the patient to avoid Qtips.     "

## 2023-10-24 ENCOUNTER — LAB VISIT (OUTPATIENT)
Dept: LAB | Facility: HOSPITAL | Age: 83
End: 2023-10-24
Attending: FAMILY MEDICINE
Payer: MEDICARE

## 2023-10-24 ENCOUNTER — OFFICE VISIT (OUTPATIENT)
Dept: INTERNAL MEDICINE | Facility: CLINIC | Age: 83
End: 2023-10-24
Payer: MEDICARE

## 2023-10-24 VITALS
HEIGHT: 70 IN | BODY MASS INDEX: 22.25 KG/M2 | WEIGHT: 155.44 LBS | TEMPERATURE: 97 F | HEART RATE: 67 BPM | SYSTOLIC BLOOD PRESSURE: 134 MMHG | OXYGEN SATURATION: 96 % | DIASTOLIC BLOOD PRESSURE: 82 MMHG

## 2023-10-24 DIAGNOSIS — Z23 NEED FOR INFLUENZA VACCINATION: ICD-10-CM

## 2023-10-24 DIAGNOSIS — Z86.73 HISTORY OF CVA (CEREBROVASCULAR ACCIDENT) WITHOUT RESIDUAL DEFICITS: ICD-10-CM

## 2023-10-24 DIAGNOSIS — Z85.46 HISTORY OF PROSTATE CANCER: ICD-10-CM

## 2023-10-24 DIAGNOSIS — I67.2 CEREBRAL ATHEROSCLEROSIS: ICD-10-CM

## 2023-10-24 DIAGNOSIS — F03.A0 MILD DEMENTIA WITHOUT BEHAVIORAL DISTURBANCE, PSYCHOTIC DISTURBANCE, MOOD DISTURBANCE, OR ANXIETY, UNSPECIFIED DEMENTIA TYPE: ICD-10-CM

## 2023-10-24 DIAGNOSIS — Z00.00 ANNUAL PHYSICAL EXAM: Primary | ICD-10-CM

## 2023-10-24 DIAGNOSIS — Z00.00 ANNUAL PHYSICAL EXAM: ICD-10-CM

## 2023-10-24 DIAGNOSIS — I70.0 AORTIC ATHEROSCLEROSIS: ICD-10-CM

## 2023-10-24 DIAGNOSIS — R73.9 ELEVATED SERUM GLUCOSE: ICD-10-CM

## 2023-10-24 DIAGNOSIS — I10 ESSENTIAL HYPERTENSION: ICD-10-CM

## 2023-10-24 LAB
ALBUMIN SERPL BCP-MCNC: 3.6 G/DL (ref 3.5–5.2)
ALP SERPL-CCNC: 71 U/L (ref 55–135)
ALT SERPL W/O P-5'-P-CCNC: 60 U/L (ref 10–44)
ANION GAP SERPL CALC-SCNC: 8 MMOL/L (ref 8–16)
AST SERPL-CCNC: 40 U/L (ref 10–40)
BILIRUB SERPL-MCNC: 0.5 MG/DL (ref 0.1–1)
BUN SERPL-MCNC: 19 MG/DL (ref 8–23)
CALCIUM SERPL-MCNC: 9.5 MG/DL (ref 8.7–10.5)
CHLORIDE SERPL-SCNC: 104 MMOL/L (ref 95–110)
CHOLEST SERPL-MCNC: 110 MG/DL (ref 120–199)
CHOLEST/HDLC SERPL: 2 {RATIO} (ref 2–5)
CO2 SERPL-SCNC: 28 MMOL/L (ref 23–29)
CREAT SERPL-MCNC: 1.1 MG/DL (ref 0.5–1.4)
ERYTHROCYTE [DISTWIDTH] IN BLOOD BY AUTOMATED COUNT: 13.2 % (ref 11.5–14.5)
EST. GFR  (NO RACE VARIABLE): >60 ML/MIN/1.73 M^2
GLUCOSE SERPL-MCNC: 73 MG/DL (ref 70–110)
HCT VFR BLD AUTO: 43.1 % (ref 40–54)
HDLC SERPL-MCNC: 55 MG/DL (ref 40–75)
HDLC SERPL: 50 % (ref 20–50)
HGB BLD-MCNC: 13.6 G/DL (ref 14–18)
LDLC SERPL CALC-MCNC: 45 MG/DL (ref 63–159)
MCH RBC QN AUTO: 30 PG (ref 27–31)
MCHC RBC AUTO-ENTMCNC: 31.6 G/DL (ref 32–36)
MCV RBC AUTO: 95 FL (ref 82–98)
NONHDLC SERPL-MCNC: 55 MG/DL
PLATELET # BLD AUTO: 216 K/UL (ref 150–450)
PMV BLD AUTO: 12.1 FL (ref 9.2–12.9)
POTASSIUM SERPL-SCNC: 4.4 MMOL/L (ref 3.5–5.1)
PROT SERPL-MCNC: 6.8 G/DL (ref 6–8.4)
RBC # BLD AUTO: 4.54 M/UL (ref 4.6–6.2)
SODIUM SERPL-SCNC: 140 MMOL/L (ref 136–145)
TRIGL SERPL-MCNC: 50 MG/DL (ref 30–150)
WBC # BLD AUTO: 7.38 K/UL (ref 3.9–12.7)

## 2023-10-24 PROCEDURE — 99999 PR PBB SHADOW E&M-EST. PATIENT-LVL III: CPT | Mod: PBBFAC,,, | Performed by: FAMILY MEDICINE

## 2023-10-24 PROCEDURE — 80061 LIPID PANEL: CPT | Performed by: FAMILY MEDICINE

## 2023-10-24 PROCEDURE — 36415 COLL VENOUS BLD VENIPUNCTURE: CPT | Performed by: FAMILY MEDICINE

## 2023-10-24 PROCEDURE — 85027 COMPLETE CBC AUTOMATED: CPT | Performed by: FAMILY MEDICINE

## 2023-10-24 PROCEDURE — 3079F PR MOST RECENT DIASTOLIC BLOOD PRESSURE 80-89 MM HG: ICD-10-PCS | Mod: CPTII,S$GLB,, | Performed by: FAMILY MEDICINE

## 2023-10-24 PROCEDURE — 3075F PR MOST RECENT SYSTOLIC BLOOD PRESS GE 130-139MM HG: ICD-10-PCS | Mod: CPTII,S$GLB,, | Performed by: FAMILY MEDICINE

## 2023-10-24 PROCEDURE — 1159F PR MEDICATION LIST DOCUMENTED IN MEDICAL RECORD: ICD-10-PCS | Mod: CPTII,S$GLB,, | Performed by: FAMILY MEDICINE

## 2023-10-24 PROCEDURE — 83036 HEMOGLOBIN GLYCOSYLATED A1C: CPT | Performed by: FAMILY MEDICINE

## 2023-10-24 PROCEDURE — 99397 PER PM REEVAL EST PAT 65+ YR: CPT | Mod: 25,S$GLB,, | Performed by: FAMILY MEDICINE

## 2023-10-24 PROCEDURE — G0008 FLU VACCINE - QUADRIVALENT - ADJUVANTED: ICD-10-PCS | Mod: S$GLB,,, | Performed by: FAMILY MEDICINE

## 2023-10-24 PROCEDURE — 99397 PR PREVENTIVE VISIT,EST,65 & OVER: ICD-10-PCS | Mod: 25,S$GLB,, | Performed by: FAMILY MEDICINE

## 2023-10-24 PROCEDURE — 3079F DIAST BP 80-89 MM HG: CPT | Mod: CPTII,S$GLB,, | Performed by: FAMILY MEDICINE

## 2023-10-24 PROCEDURE — 1126F PR PAIN SEVERITY QUANTIFIED, NO PAIN PRESENT: ICD-10-PCS | Mod: CPTII,S$GLB,, | Performed by: FAMILY MEDICINE

## 2023-10-24 PROCEDURE — 1126F AMNT PAIN NOTED NONE PRSNT: CPT | Mod: CPTII,S$GLB,, | Performed by: FAMILY MEDICINE

## 2023-10-24 PROCEDURE — 3075F SYST BP GE 130 - 139MM HG: CPT | Mod: CPTII,S$GLB,, | Performed by: FAMILY MEDICINE

## 2023-10-24 PROCEDURE — 1159F MED LIST DOCD IN RCRD: CPT | Mod: CPTII,S$GLB,, | Performed by: FAMILY MEDICINE

## 2023-10-24 PROCEDURE — G0008 ADMIN INFLUENZA VIRUS VAC: HCPCS | Mod: S$GLB,,, | Performed by: FAMILY MEDICINE

## 2023-10-24 PROCEDURE — 90694 FLU VACCINE - QUADRIVALENT - ADJUVANTED: ICD-10-PCS | Mod: S$GLB,,, | Performed by: FAMILY MEDICINE

## 2023-10-24 PROCEDURE — 99999 PR PBB SHADOW E&M-EST. PATIENT-LVL III: ICD-10-PCS | Mod: PBBFAC,,, | Performed by: FAMILY MEDICINE

## 2023-10-24 PROCEDURE — 80053 COMPREHEN METABOLIC PANEL: CPT | Performed by: FAMILY MEDICINE

## 2023-10-24 PROCEDURE — 90694 VACC AIIV4 NO PRSRV 0.5ML IM: CPT | Mod: S$GLB,,, | Performed by: FAMILY MEDICINE

## 2023-10-24 PROCEDURE — 84443 ASSAY THYROID STIM HORMONE: CPT | Performed by: FAMILY MEDICINE

## 2023-10-24 NOTE — PROGRESS NOTES
Subjective:   Patient ID: Pravin Ferrari is a 82 y.o. male.  Chief Complaint:  6mth f/u    Presents for annual physical exam   Visit completed with wife in room throughout the exam.      Last visit April 2023 for hospital discharge follow-up after admission, evaluation, and treatment for CVA.    Medical history:  - Cerebral atherosclerosis, Aortic atherosclerosis, History of CVA (cerebrovascular accident) without residual deficits.  On aspirin 81 mg daily, Plavix 75 mg daily, and Lipitor 40 mg daily.  Lipid panel done last visit with LDL 49 and at goal less than 70.  Reports compliance medication.  Denies side effects.  No recurrent TIA or CVA symptoms or chest pain.  - Hypertension.  Controlled.  On amlodipine 5 mg daily.  Reports compliance.  Denies side effects.  Denies shortness of breath or swelling.  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  Followed by Neurology.  On Aricept 10 mg daily and Namenda 10 mg twice a day.  Reports compliance.  Denies side effects.  No significant decline in cognitive function since last visit.  Continues to remain without any other behavioral, psychotic, or mood disturbances.  - History prostate cancer.  Followed by external Urology.  Serial PSAs have remained normal/undetectable.    Health maintenance:  - Needs shingles vaccine series   - Needs new COVID booster   - Needs flu vaccine    No new complaints or concerns today    Review of Systems   Constitutional:  Negative for chills, diaphoresis, fatigue and fever.   HENT:  Positive for hearing loss.    Eyes:  Negative for visual disturbance.   Respiratory:  Negative for cough, chest tightness and shortness of breath.    Cardiovascular:  Negative for chest pain, palpitations and leg swelling.   Gastrointestinal:  Negative for abdominal pain, constipation, diarrhea, nausea and vomiting.   Endocrine: Negative for cold intolerance and heat intolerance.   Genitourinary:  Negative for difficulty urinating.  "  Musculoskeletal:  Negative for myalgias and neck pain.   Skin:  Negative for rash.   Neurological:  Negative for dizziness, tremors, syncope, weakness, light-headedness, numbness and headaches.   Hematological:  Bruises/bleeds easily.   Psychiatric/Behavioral:  Positive for confusion and decreased concentration. Negative for agitation, behavioral problems, dysphoric mood, hallucinations and sleep disturbance. The patient is not nervous/anxious.        Objective:   /82 (BP Location: Left arm, Patient Position: Sitting, BP Method: Small (Manual))   Pulse 67   Temp 96.7 °F (35.9 °C) (Tympanic)   Ht 5' 10" (1.778 m)   Wt 70.5 kg (155 lb 6.8 oz)   SpO2 96%   BMI 22.30 kg/m²     Physical Exam  Vitals and nursing note reviewed.   Constitutional:       Appearance: Normal appearance. He is well-developed and normal weight.   HENT:      Right Ear: Decreased hearing noted.      Left Ear: Decreased hearing noted.   Neck:      Thyroid: No thyroid mass, thyromegaly or thyroid tenderness.      Vascular: No carotid bruit or JVD.   Cardiovascular:      Rate and Rhythm: Normal rate and regular rhythm.      Heart sounds: Normal heart sounds. No murmur heard.  Pulmonary:      Effort: Pulmonary effort is normal.      Breath sounds: Normal breath sounds and air entry.   Abdominal:      General: There is no distension.      Palpations: Abdomen is soft.      Tenderness: There is no abdominal tenderness.   Musculoskeletal:      Right lower leg: No edema.      Left lower leg: No edema.   Skin:     Findings: Bruising present. No rash.   Neurological:      Cranial Nerves: No dysarthria or facial asymmetry.      Gait: Gait normal.   Psychiatric:         Mood and Affect: Affect normal. Mood is anxious.         Cognition and Memory: Cognition is impaired. Memory is impaired.       Assessment:       ICD-10-CM ICD-9-CM   1. Annual physical exam  Z00.00 V70.0   2. Need for influenza vaccination  Z23 V04.81   3. Cerebral atherosclerosis "  I67.2 437.0   4. Aortic atherosclerosis  I70.0 440.0   5. History of CVA (cerebrovascular accident) without residual deficits  Z86.73 V12.54   6. Essential hypertension  I10 401.9   7. Mild dementia without behavioral disturbance, psychotic disturbance, mood disturbance, or anxiety, unspecified dementia type  F03.A0 294.10   8. Elevated serum glucose  R73.9 790.29   9. History of prostate cancer  Z85.46 V10.46     Plan:   Annual physical exam  Need for influenza vaccination  -     CBC Without Differential; Future; Expected date: 10/24/2023  -     Comprehensive Metabolic Panel; Future; Expected date: 10/24/2023  -     Lipid Panel; Future; Expected date: 10/24/2023  -     TSH; Future; Expected date: 10/24/2023  -     Hemoglobin A1C; Future; Expected date: 10/24/2023  -     Influenza - Quadrivalent (Adjuvanted)  Blood pressure normal.  BMI 22.  Remainder exam stable   Check labs.  Treat as indicated.  Colon cancer screening discontinue age   Prostate cancer surveillance today   Flu vaccine today  Recommend new COVID booster through pharmacy   Other immunizations up-to-date     Cerebral atherosclerosis  Aortic atherosclerosis  History of CVA (cerebrovascular accident) without residual deficits  -     Lipid Panel; Future; Expected date: 10/24/2023  Stable.  Asymptomatic.    Return to previous baseline post CVA   No recurrent CVA or TIA symptoms   Continue aspirin 81 mg daily  Continue Plavix 75 mg daily   Adjust Lipitor 40 mg daily if needed     Essential hypertension  Controlled.  Stable.  Asymptomatic.  BP at goal.    Continue amlodipine 5 mg daily     Mild dementia without behavioral disturbance, psychotic disturbance, mood disturbance, or anxiety, unspecified dementia type  Stable   No obvious declining cognitive function since last visit   Continue Namenda and Aricept   Follow-up neurology as scheduled     Elevated serum glucose  -     Hemoglobin A1C; Future; Expected date: 10/24/2023  Treat for prediabetes  diabetes if indicated     History of prostate cancer  -     PROSTATE SPECIFIC ANTIGEN, DIAGNOSTIC; Future; Expected date: 10/24/2023    Return to clinic 1 year for annual physical exam or sooner if needed

## 2023-10-25 LAB
ESTIMATED AVG GLUCOSE: 111 MG/DL (ref 68–131)
HBA1C MFR BLD: 5.5 % (ref 4–5.6)
TSH SERPL DL<=0.005 MIU/L-ACNC: 1.93 UIU/ML (ref 0.4–4)

## 2024-01-09 ENCOUNTER — PATIENT MESSAGE (OUTPATIENT)
Dept: INTERNAL MEDICINE | Facility: CLINIC | Age: 84
End: 2024-01-09
Payer: MEDICARE

## 2024-01-09 DIAGNOSIS — F03.A0 MILD DEMENTIA WITHOUT BEHAVIORAL DISTURBANCE, PSYCHOTIC DISTURBANCE, MOOD DISTURBANCE, OR ANXIETY, UNSPECIFIED DEMENTIA TYPE: Primary | Chronic | ICD-10-CM

## 2024-01-09 DIAGNOSIS — Z86.73 HISTORY OF CVA (CEREBROVASCULAR ACCIDENT) WITHOUT RESIDUAL DEFICITS: ICD-10-CM

## 2024-01-09 DIAGNOSIS — I67.2 CEREBRAL ATHEROSCLEROSIS: Chronic | ICD-10-CM

## 2024-01-15 DIAGNOSIS — I10 ESSENTIAL HYPERTENSION: ICD-10-CM

## 2024-01-15 NOTE — TELEPHONE ENCOUNTER
No care due was identified.  [facility] Embedded Care Due Messages. Reference number: [id number].   1/15/2024 4:15:45 AM CST

## 2024-01-16 RX ORDER — AMLODIPINE BESYLATE 5 MG/1
5 TABLET ORAL
Qty: 90 TABLET | Refills: 3 | Status: SHIPPED | OUTPATIENT
Start: 2024-01-16

## 2024-01-16 NOTE — TELEPHONE ENCOUNTER
Pravin Ferrari  is requesting a refill authorization.  Brief Assessment and Rationale for Refill:  Approve     Medication Therapy Plan:         Comments:     Note composed:5:22 AM 01/16/2024

## 2024-01-17 ENCOUNTER — OFFICE VISIT (OUTPATIENT)
Dept: NEUROLOGY | Facility: CLINIC | Age: 84
End: 2024-01-17
Payer: MEDICARE

## 2024-01-17 VITALS — WEIGHT: 154 LBS | BODY MASS INDEX: 22.1 KG/M2

## 2024-01-17 DIAGNOSIS — Z86.73 HISTORY OF CVA (CEREBROVASCULAR ACCIDENT) WITHOUT RESIDUAL DEFICITS: ICD-10-CM

## 2024-01-17 DIAGNOSIS — R13.10 DYSPHAGIA, UNSPECIFIED TYPE: ICD-10-CM

## 2024-01-17 DIAGNOSIS — I70.0 AORTIC ATHEROSCLEROSIS: Chronic | ICD-10-CM

## 2024-01-17 DIAGNOSIS — I67.2 CEREBRAL ATHEROSCLEROSIS: Chronic | ICD-10-CM

## 2024-01-17 DIAGNOSIS — F03.A0 MILD DEMENTIA WITHOUT BEHAVIORAL DISTURBANCE, PSYCHOTIC DISTURBANCE, MOOD DISTURBANCE, OR ANXIETY, UNSPECIFIED DEMENTIA TYPE: Chronic | ICD-10-CM

## 2024-01-17 DIAGNOSIS — Z85.46 HISTORY OF PROSTATE CANCER: Chronic | ICD-10-CM

## 2024-01-17 DIAGNOSIS — I10 ESSENTIAL HYPERTENSION: Chronic | ICD-10-CM

## 2024-01-17 DIAGNOSIS — T78.3XXA ANGIOEDEMA DUE TO ANGIOTENSIN CONVERTING ENZYME INHIBITOR (ACE-I): ICD-10-CM

## 2024-01-17 DIAGNOSIS — T46.4X5A ANGIOEDEMA DUE TO ANGIOTENSIN CONVERTING ENZYME INHIBITOR (ACE-I): ICD-10-CM

## 2024-01-17 DIAGNOSIS — R41.3 OTHER AMNESIA: ICD-10-CM

## 2024-01-17 DIAGNOSIS — R41.3 MEMORY DIFFICULTIES: ICD-10-CM

## 2024-01-17 DIAGNOSIS — G30.9 MODERATE MAJOR NEUROCOGNITIVE DISORDER DUE TO ALZHEIMER'S DISEASE WITH BEHAVIORAL DISTURBANCE: Primary | ICD-10-CM

## 2024-01-17 DIAGNOSIS — F02.B18 MODERATE MAJOR NEUROCOGNITIVE DISORDER DUE TO ALZHEIMER'S DISEASE WITH BEHAVIORAL DISTURBANCE: Primary | ICD-10-CM

## 2024-01-17 PROCEDURE — 1101F PT FALLS ASSESS-DOCD LE1/YR: CPT | Mod: CPTII,S$GLB,, | Performed by: NURSE PRACTITIONER

## 2024-01-17 PROCEDURE — 99417 PROLNG OP E/M EACH 15 MIN: CPT | Mod: S$GLB,,, | Performed by: NURSE PRACTITIONER

## 2024-01-17 PROCEDURE — 1160F RVW MEDS BY RX/DR IN RCRD: CPT | Mod: CPTII,S$GLB,, | Performed by: NURSE PRACTITIONER

## 2024-01-17 PROCEDURE — 99999 PR PBB SHADOW E&M-EST. PATIENT-LVL V: CPT | Mod: PBBFAC,,, | Performed by: NURSE PRACTITIONER

## 2024-01-17 PROCEDURE — 1159F MED LIST DOCD IN RCRD: CPT | Mod: CPTII,S$GLB,, | Performed by: NURSE PRACTITIONER

## 2024-01-17 PROCEDURE — 3288F FALL RISK ASSESSMENT DOCD: CPT | Mod: CPTII,S$GLB,, | Performed by: NURSE PRACTITIONER

## 2024-01-17 PROCEDURE — 99215 OFFICE O/P EST HI 40 MIN: CPT | Mod: S$GLB,,, | Performed by: NURSE PRACTITIONER

## 2024-01-17 RX ORDER — MEMANTINE HYDROCHLORIDE 10 MG/1
10 TABLET ORAL 2 TIMES DAILY
Qty: 180 TABLET | Refills: 3 | Status: SHIPPED | OUTPATIENT
Start: 2024-01-17 | End: 2024-05-24 | Stop reason: SDUPTHER

## 2024-01-17 RX ORDER — DONEPEZIL HYDROCHLORIDE 10 MG/1
10 TABLET, FILM COATED ORAL NIGHTLY
Qty: 90 TABLET | Refills: 3 | Status: SHIPPED | OUTPATIENT
Start: 2024-01-17 | End: 2024-02-08 | Stop reason: SDUPTHER

## 2024-01-17 NOTE — PATIENT INSTRUCTIONS
HERE ARE 10 WAYS TO REDUCE RISK OF DEMENTIA AND SLOW DOWN THE PROGRESSION OF DEMENTIA        1.Be physically active.    2. Avoid smoking and alcohol consumption.    3. Track your numbers. Keep your blood pressure, cholesterol, blood sugar and weight within recommended ranges.    4. Stay socially connected.    5. Make healthy food choices. Eat a well-balance and healthy diet that rich in cereals, fish, legumes, and vegetables.    6. Reduce stress.     7. Challenge your brain by trying something new, playing games, or learning a new language.    8. Take care of your hearing. Avoid being continuously exposed to loud sounds and wear a hearing aid if hearing does become a problem.    9. Lower risk of falls. Consider installing handrails on all stairs and grab bars in bathrooms.    10. Reduce your exposure to air pollution, such as exposure to exhaust in heavy traffic.         CAREGIVERS COUNSELING     Recommend reading the following books:     The 36-Hour Day and there are many online and printed resources to help caregivers as well.     Alzheimer's Through the Stages.     Dementia with G.R.A.C.E.            PREVENTION OF DELIRIUM       1. Good hydration and avoid electrolyte imbalance  2. Recognize and treat infections immediately especially UTI.  3. Bladder emptying and prevent constipation.   4. Provide stimulating activities and familiar objects  5. Use eyeglasses and hearing aids if needed.   6. Use simple and regular communication about people, current place, and time  7. Mobility and range-of-motion exercises  8. Reduce noise, lighting and avoid sleep interruptions  9. Non-narcotic pain management.  10.Nondrug treatment for sleep problems or anxiety  11. Avoid antihistamines.  12. Avoid narcotics.  13. Avoid benzodiazepines.            HELPFUL STRATEGIES FOR THE FAMILY AND CAREGIVERS        BEHAVIORAL MANAGEMENT STRATEGIES     Remember that you cannot reason with acute psychosis or confusion. Unless there is  "an immediate safety issue, there is no need to challenge or correct the person.  For example, if a pt is hallucinating, do not argue with the person that what they are seeing is not real. If they are expressing paranoia, empathize gently with their fear, and attempt to redirect them to a different activity.   If the person is particularly stuck on a topic or activity, as long as the activity is safe and is not worsening their agitation, you don't need to intervene.     Communicate calmly, simply, and concisely    Reassure the person that they are safe and you are here to help  Try not to express irritation or anger  Speak quietly and calmly, do not shout or threaten the person. Avoid provocation.   Establish verbal contact and provide orientation and reassurance.  Attempt to identify the patient's wants and feelings, listen to what they are saying, and reflect those wants and feelings back to the patient.  Dont use sarcasm as a weapon    Set clear limits on behavior in a calm voice ("You cannot hit the nurse.")  Offer choices and optimism ("Which toothpaste would you like to use today?")    Redirect the person to an alternative behavior ("Can you come help me with this?)    Avoid saying things like, "We already went over this!" "You can't keep doing this." "I already explained this to you"  Instead, simply nod calmly and acknowledge what the person is saying ("Yes, that makes sense."), and then request their help or company in a different behavior.   Having a mental list of activities the patient enjoys can be helpful with redirection. For example, do they enjoy going for walks? Eating a piece of candy?   If redirection becomes challenging, you can place objects that your family member enjoys strategically in the home in order to use for distraction. This is particularly useful if there are items that they often talk about or frequently ask you questions about; or if they are visually striking. Once you focus the " "person on this object, talk about it briefly until they are calm and then attempt to redirect to a new behavior.   Sometimes activities which are repetitive can be calming, particularly if there is a comforting sensory element. For example, pt may enjoy folding soft towels or laundry.    Limit overstimulation    Decrease distractions by turning off the TV, computer, any fluorescent lights that hum, etc.  Ask any casual visitors to leave--the fewer people the better  If the person is very agitated, avoid touching them, and respect their personal space  Sit down and ask the person to sit down also     PREVENTATIVE STRATEGIES     Try to help the patient develop a routine and stick to it  Address all immediate safety issues  Does the person still have access to the car and keys? Take the keys away, or disconnect the car battery.  Are they wandering at night? Install locks on the outside doors. A keypad lock works well. Alarms on bedroom doors can also be helpful.   Are they turning on the stove and risking a fire? If you cannot limit their access to the kitchen, you may need to unplug dangerous devices any time you are not in the room.   If the person is exhibiting poor judgment throughout the day, they likely need someone supervising them at all times.   Do they still have access to significant financial assets? Limit their access to accounts, and consult with a  if necessary.   Identify situations that seem to trigger agitation or a problematic behavior, and modify the person's environment to minimize or eliminate that trigger.   For example, is their behavior worse if they don't get a good night's sleep? Or if they don't eat or drink enough? If so, prioritize those activities and build behavior plans to support them.  Do they get upset about not being allowed to answer the phone when it rings? Put all of the phones in the house on "silent."   Do they become paranoid that certain family members are trying to " take advantage of them? Limit contact with the targeted family member as much as possible, and re-introduce them slowly after some time has passed.   Encourage independence in daily living whenever safely possible  Encourage collaborative decision-making regarding his care as well as daily activities  Encourage regular physical exercise  Address issues that may be impacting sleep   Ex: Urology consult for frequent nighttime urination, address chronic pain that may be interfering with sleep, moving to a different room if his roommate snores loudly, make sure the room is a comfortable temperature and he has adequate pillows and blankets  Ensure he gets out into the sunlight at least once per day to encourage regular circadian cycle  No caffeine, sugar, or large meals within two hours of bedtime   Make sure room at night is dark and quiet and minimize nighttime interruptions from staff unless medically necessary   Try to help pt go to sleep and wake up at the same time every day  Monitor closely for worsening psychiatric symptoms (insomnia, social withdrawal, deterioration of personal hygiene, hostility, confusing or nonsensical speech, paranoia, hallucinations) and intervene promptly. Assess for possible antecedents, modify environment appropriately.            SLEEP HYGIENE     Poor sleep has a negative effect on cognition. Several strategies have been shown to improve sleep:     Caffeine intake in the afternoon and evening, as well as stuffing oneself at supper, can decrease the quality of restful sleep throughout the night.   Bedtime and wake-up times should be consistent every night and morning so the body becomes used to a single routine, even on the weekends.  Engage in daily physical activity, but not 2-3 hours before bedtime.   No technology use (television, computer, iPad) 1-2 hours before bed.   Have a wind down routine (e.g., soft lights in the house, bath before bed, reduced fluid intake, songs, reading,  less noise) to promote sleep readiness.   Visit the www.sleepfoundation.org for more strategies.      COGNITIVE HYGIENE     Engage in regular exercise, which increases alertness and arousal and can improve attention and focus.  Consider lower impact exercises, such as yoga or light walking.  Get a good nights sleep, as this can enhance alertness and cognition.  Eat healthy foods and balanced meals. It is notable that research indicates certain nutrients may aid in brain function, such as B vitamins (especially B6, B12, and folic acid), antioxidants (such as vitamins C and E, and beta carotene), and Omega-3 fatty acids. Talk with your physician or nutritionist about whats right for you.   Keep your brain active. Find activities to stay mentally active, such as reading, games (cards, checkers), puzzles (crosswords, Sudoku, jig saw), crafts (models, woodworking), gardening, or participating in activities in the community.  Stay socially engaged. Continue staying active with your family and friends.      FUTURE PLANNING     The patient and caregivers should consider formal arrangements to allow a designated person to make medical and financial decisions for the pt, should he/she become unable to do so.  Options to consider include designating a healthcare proxy, medical and/or financial power of , and completing advanced directives for healthcare decisions and estate planning (e.g., finalizing a will).  If cost is prohibitive, [facility] [employer] [facility] (https://Locomizer.org/) provides free  for individuals with low income.       ADDITIONAL RESOURCES     [facility] (www.http://alzbr.org) have good local caregiver and patient resources.   Consider resources for support through the [place] Office of Elderly Affairs (http://goea.[place].gov/), [place] Chapter of the Alzheimers Association (www.alz.org/[place]/), the [employer]  (www.caregiver.org), and the American Psychological Association (http://www.apa.org/pi/about/publications/caregivers/consumers/index.aspxconsumers/index.aspx).  For More Information About Hallucinations, Delusions, and Paranoia in Alzheimer's TAB Alzheimer's and related Dementias Education and Referral (ADEAR) Center 1-128.306.7834 (toll-free) adear@tab.nih.gov www.tab.nih.gov/alzheimers The National [place] [employer] [facility]'s [facility] Center offers information and free print publications about Alzheimer's disease and related dementias for families, caregivers, and health professionals. ADEAR Center staff answer telephone, email, and written requests and make referrals to local and national resources                    Get your free trial of Jelly Drops  Get your first 6x pack of Jelly Drops for free, when you subscribe. You only need to pay the postage.  If they're not for you, its no problem - you can cancel for free at any time, even before your first multipack.  CLAIM YOUR FREE TRIAL  No contract, cancel anytime  Free 6x pack trial sent within 1 working day  First subscription box sent 7 days later, unless cancelled  Subscription is for a 42x pack & ships every 4 weeks - Want more?  Each delivery costs £39.90 + £6.95 shipping  Absolutely no contracts, no fees & no penalties                Horse chest nut and Tonic water for cramping   Learn More

## 2024-01-17 NOTE — PROGRESS NOTES
Subjective:       Patient ID: Pravin Ferrari is a 83 y.o. male.    Chief Complaint: Dementia          HPIThe patient is here to establish care for  memory loss.      The patient is presenting with memory loss that began approximately in 2019. The patient is accompanied by spouse and sister. The main problems the patient has are related to progressive short term memory loss. In 2022 the patient memory worsened and the patient was evaluated at [facility] and diagnosed with dementia. Wife gave many examples, the patient would repeat the same question repeatedly. The patient sometimes look at pictures of  grand kids and unable to recall who they are or unable to recall their names. The patient is no longer driving stopped driving in 2020, he began to get lost.  The patient is not losing personal items and does not put them in odd places. No confusion around and inside the house. Patient has trouble remembering the date and the day weeks. Patient spouse manages medications and appointments Patient able to recall major holidays. Patient is unaware of political changes. Patient wife handling finances. The patient is still independent with ADLs, able to give his own bath, dress hisself, wife assist with clothes selection. Has some mild agitation. No hallucinations or delusions. No seizures. No problems handling tools. History of multiple strokes MRI Brain WO 01- Left basal ganglia Small focus of acute or subacute ischemia left basal ganglia/posterior limb internal capsule. Patient had speech changes, slurred speech, Right sided weakness and numbness deficit.  Stroke risk factor HTN, HLD, Takes ASA 81 mg po daily and Plavix 75 mg po daily.   No history of headaches. No history of hypothyroidism. No history of alcoholism.  No history of B12 deficiency. No history of depression. No history of bipolar disorder. No history of Untreated Syphilis.  No history of HIV infection. No toxic exposures.  No history of traumatic brain  injury. Mild bilateral tremors. No falls, unsteady wide based gait. . No urinary incontinence. No change in sleep and appetite good. No family history of dementia. Takes Aricept 10 mg po Night and Namenda 10 mg BID tolerating well, no side effects.     Review of Systems   Unable to perform ROS: Dementia   Constitutional: Negative.    HENT:  Positive for trouble swallowing.    Eyes: Negative.    Respiratory: Negative.     Gastrointestinal: Negative.    Endocrine: Negative.    Genitourinary: Negative.    Musculoskeletal:  Positive for arthralgias and gait problem.   Skin: Negative.    Allergic/Immunologic: Negative.    Neurological:  Positive for numbness.   Hematological: Negative.    Psychiatric/Behavioral:  Positive for agitation, confusion, decreased concentration and dysphoric mood. Negative for behavioral problems, hallucinations, self-injury, sleep disturbance and suicidal ideas. The patient is not nervous/anxious and is not hyperactive.                  Current Outpatient Medications:     amLODIPine (NORVASC) 5 MG tablet, TAKE 1 TABLET(5 MG) BY MOUTH EVERY DAY, Disp: 90 tablet, Rfl: 3    aspirin (ECOTRIN) 81 MG EC tablet, Take 81 mg by mouth once daily., Disp: , Rfl:     atorvastatin (LIPITOR) 40 MG tablet, Take 1 tablet (40 mg total) by mouth once daily., Disp: 90 tablet, Rfl: 3    clopidogreL (PLAVIX) 75 mg tablet, Take 1 tablet (75 mg total) by mouth once daily., Disp: 90 tablet, Rfl: 3    multivitamin (THERAGRAN) per tablet, Take 1 tablet by mouth once daily., Disp: , Rfl:     donepeziL (ARICEPT) 10 MG tablet, Take 1 tablet (10 mg total) by mouth nightly., Disp: 90 tablet, Rfl: 3    memantine (NAMENDA) 10 MG Tab, Take 1 tablet (10 mg total) by mouth 2 (two) times a day., Disp: 180 tablet, Rfl: 3  Past Medical History:   Diagnosis Date    Acute CVA (cerebrovascular accident) 1/11/2023    Cancer     prostate    [place] (hard of hearing)     bilateral ears    Hypertension     pt states no     Past Surgical  History:   Procedure Laterality Date    COLONOSCOPY N/A 3/19/2019    Procedure: COLONOSCOPY;  Surgeon: Korin Cavazos MD;  Location: [facility];  Service: Endoscopy;  Laterality: N/A;    EYE SURGERY      HERNIA REPAIR      PROSTATE SURGERY       Social History     Socioeconomic History    Marital status:    Tobacco Use    Smoking status: Never    Smokeless tobacco: Never   Substance and Sexual Activity    Alcohol use: Not Currently    Drug use: No    Sexual activity: Not Currently             Past/Current Medical/Surgical History, Past/Current Social History, Past/Current Family History and Past/Current Medications were reviewed in detail.        Objective:           VITAL SIGNS WERE REVIEWED      GENERAL APPEARANCE:     The patient looks comfortable.    BMI 22.10 KG     No signs of respiratory distress.    Normal breathing pattern.    No dysmorphic features    Normal eye contact.     GENERAL MEDICAL EXAM:    HEENT:  Head is atraumatic normocephalic.     No tender temporal arteries. Fundoscopic (Ophthalmoscopic) exam showed no disc edema.      Neck and Axillae: No JVD. No visible lesions.    No carotid bruits. No thyromegaly. No lymphadenopathy.    Cardiopulmonary: No cyanosis. No tachypnea. Normal respiratory effort.    Gastrointestinal/Urogenital:  No jaundice. No stomas or lesions. No visible hernias. No catheters.     Abdomen is soft non-tender. No masses or organomegaly.    Skin, Hair and Nails: No pathognonomic skin rash. No neurofibromatosis. No visible lesions.No stigmata of autoimmune disease. No clubbing.    Skin is warm and moist. No palpable masses.    Limbs: No varicose veins. No visible swelling.    No palpable edema. Pulses are symmetric. Pedal pulses are palpable.      Muskoskeletal: No visible deformities.No visible lesions.    No spine tenderness. No signs of longstanding neuropathy. No dislocations or fractures.            Neurologic Exam     Mental Status   Oriented to person, place, and  time.   Follows 3 step commands.   Attention: decreased. Concentration: decreased.   Speech: speech is normal and not slurred   Level of consciousness: alert  Knowledge: consistent with education and poor. Able to perform simple calculations.   Able to name object. Able to read. Able to repeat. Able to write. Abnormal comprehension.     Cranial Nerves     CN II   Visual fields full to confrontation.   Visual acuity: normal  Right visual field deficit: none  Left visual field deficit: none     CN III, IV, VI   Pupils are equal, round, and reactive to light.  Extraocular motions are normal.   Right pupil: Size: 2 mm. Shape: regular. Reactivity: brisk. Consensual response: intact. Accommodation: intact.   Left pupil: Size: 2 mm. Shape: regular. Reactivity: brisk. Consensual response: intact. Accommodation: intact.   CN III: no CN III palsy  CN VI: no CN VI palsy  Nystagmus: none   Diplopia: none  Ophthalmoparesis: none  Upgaze: normal  Downgaze: normal  Conjugate gaze: present  Vestibulo-ocular reflex: present    CN V   Facial sensation intact.   Right facial sensation deficit: none  Left facial sensation deficit: none    CN VII   Left facial weakness: none  Left taste: normal    CN VIII   CN VIII normal.   Hearing: intact  Right Rinne: AC > BC  Left Rinne: AC > BC    CN IX, X   CN IX normal.   CN X normal.   Palate: symmetric    CN XI   CN XI normal.   Right sternocleidomastoid strength: normal  Left sternocleidomastoid strength: normal  Right trapezius strength: normal  Left trapezius strength: normal    CN XII   CN XII normal.   Tongue: not atrophic  Fasciculations: absent  Tongue deviation: none    Motor Exam   Muscle bulk: normal  Overall muscle tone: normal  Right arm tone: normal  Left arm tone: normal  Right arm pronator drift: absent  Left arm pronator drift: absent  Right leg tone: normal  Left leg tone: normal    Strength   Strength 5/5 throughout.   Right neck flexion: 5/5  Left neck flexion: 5/5  Right  neck extension: 5/5  Left neck extension: 5/5  Right deltoid: 5/5  Left deltoid: 5/5  Right biceps: 5/5  Left biceps: 5/5  Right triceps: 5/5  Left triceps: 5/  Right wrist flexion: 5/  Left wrist flexion: 5/5  Right wrist extension: 5/  Left wrist extension: /  Right interossei: 5/  Left interossei: 5/  Right iliopsoas:   Left iliopsoas:   Right quadriceps:   Left quadriceps: 5/  Right hamstrin  Left hamstrin/5  Right glutei:   Left glutei:   Right anterior tibial:   Left anterior tibial:   Right posterior tibial:   Left posterior tibial:   Right peroneal:   Left peroneal:   Right gastroc:   Left gastroc:     Sensory Exam   Light touch normal.   Right arm light touch: normal  Left arm light touch: normal  Right leg light touch: normal  Left leg light touch: normal  Vibration normal.   Right arm vibration: normal  Left arm vibration: normal  Right leg vibration: normal  Left leg vibration: normal  Proprioception normal.   Right arm proprioception: normal  Left arm proprioception: normal  Right leg proprioception: normal  Left leg proprioception: normal  Right arm pinprick: normal  Left arm pinprick: normal  Right leg pinprick: decreased from toes  Left leg pinprick: decreased from toes  Sensory deficit distribution on left: lateral cutaneous thigh  Graphesthesia: normal  Stereognosis: normal    Gait, Coordination, and Reflexes     Gait  Gait: wide-based    Coordination   Romberg: negative  Finger to nose coordination: normal  Heel to shin coordination: normal  Tandem walking coordination: normal    Tremor   Resting tremor: absent  Intention tremor: absent  Action tremor: right arm    Reflexes   Right brachioradialis: 2+  Left brachioradialis: 2+  Right biceps: 2+  Left biceps: 2+  Right triceps: 2+  Left triceps: 2+  Right patellar: 2+  Left patellar: 2+  Right achilles: 0  Left achilles: 0  Right plantar: normal  Left plantar: normal  Right Pulido:  absent  Left Pulido: absent  Right ankle clonus: absent  Left ankle clonus: absent  Right pendular knee jerk: absent  Left pendular knee jerk: absent      Mild postural tremors    Poor posture            FRIDA COGNITIVE ASSESSMENT (MOCA) TOTAL SCORE       MODERATE DEMENTIA 10-19    SEVERE DEMENTIA <10        DATE 01-       TOTAL SCORE 18/30       VISUOSPATIAL EXECUTIVE (5) 3       NAMING (3) 3       ATTENTION (6) 6       LANGUAGE (3) 3       ABSTRACTION(2) 1       RECALL (5) 0       ORIENTATION (6) 2           Lab Results   Component Value Date    WBC 7.38 10/24/2023    HGB 13.6 (L) 10/24/2023    HCT 43.1 10/24/2023    MCV 95 10/24/2023     10/24/2023     Sodium   Date Value Ref Range Status   10/24/2023 140 136 - 145 mmol/L Final     Potassium   Date Value Ref Range Status   10/24/2023 4.4 3.5 - 5.1 mmol/L Final     Chloride   Date Value Ref Range Status   10/24/2023 104 95 - 110 mmol/L Final     CO2   Date Value Ref Range Status   10/24/2023 28 23 - 29 mmol/L Final     Glucose   Date Value Ref Range Status   10/24/2023 73 70 - 110 mg/dL Final     BUN   Date Value Ref Range Status   10/24/2023 19 8 - 23 mg/dL Final     Creatinine   Date Value Ref Range Status   10/24/2023 1.1 0.5 - 1.4 mg/dL Final     Calcium   Date Value Ref Range Status   10/24/2023 9.5 8.7 - 10.5 mg/dL Final     Total Protein   Date Value Ref Range Status   10/24/2023 6.8 6.0 - 8.4 g/dL Final     Albumin   Date Value Ref Range Status   10/24/2023 3.6 3.5 - 5.2 g/dL Final     Total Bilirubin   Date Value Ref Range Status   10/24/2023 0.5 0.1 - 1.0 mg/dL Final     Comment:     For infants and newborns, interpretation of results should be based  on gestational age, weight and in agreement with clinical  observations.    Premature Infant recommended reference ranges:  Up to 24 hours.............<8.0 mg/dL  Up to 48 hours............<12.0 mg/dL  3-5 days..................<15.0 mg/dL  6-29 days.................<15.0 mg/dL        Alkaline Phosphatase   Date Value Ref Range Status   10/24/2023 71 55 - 135 U/L Final     AST   Date Value Ref Range Status   10/24/2023 40 10 - 40 U/L Final     ALT   Date Value Ref Range Status   10/24/2023 60 (H) 10 - 44 U/L Final     Anion Gap   Date Value Ref Range Status   10/24/2023 8 8 - 16 mmol/L Final     eGFR if    Date Value Ref Range Status   01/12/2022 >60 >60 mL/min/1.73 m^2 Final     eGFR if non    Date Value Ref Range Status   01/12/2022 >60 >60 mL/min/1.73 m^2 Final     Comment:     Calculation used to obtain the estimated glomerular filtration  rate (eGFR) is the CKD-EPI equation.        Lab Results   Component Value Date    [id number] 360 12/06/2021     Lab Results   Component Value Date    TSH 1.928 10/24/2023       EKG     03-    QT interval 410 HR 61         RADIOLOGY EVALUATION:    MRI BRAIN WO:    02-    No acute abnormality.  Moderately advanced atrophy and white matter degeneration.     Chronic left basal ganglia lacunar infarct with associated white matter degeneration extending into the left cerebral peduncle.     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      MRI showed multiple old strokes and moderate atrophy could been seen in patients with Vascular dementia . No acute findings  01-    MRI Brain WO     Small focus of acute or subacute ischemia left basal ganglia/posterior limb internal capsule.         02-    MRI Brain WO     Negative MRI Brain. Findings felt consistent with marked atrophy, microvascular change and previouse small vessel insults.         No results found in the last 24 hours.      Reviewed the neuroimaging independently       Assessment:       1. 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 Other amnesia    3. Mild dementia without behavioral disturbance, psychotic disturbance, mood disturbance, or anxiety, unspecified dementia  type    4. Cerebral atherosclerosis    5. History of CVA (cerebrovascular accident) without residual deficits    6. Dysphagia, unspecified type    7. Memory difficulties    8. Essential hypertension    9. History of prostate cancer    10. Aortic atherosclerosis    11. Angioedema due to angiotensin converting enzyme inhibitor (ACE-I)        Plan:         MODERATE MAJOR NEUROCOGNITIVE DISORDER DUE TO VASCULAR DEMENTIA VS ALZHEIMER'S DISEASE WITH BEHAVIORAL DISTURBANCE/ FORGETFULNESS/ AGITATION/ HX OF STROKE/ SWALLOWING DIFFICULTY         EVALUATION     Brain MRI.    Request [facility] [place] records     Comprehensive Neuropsychological Testing     No longer  driving.       DISEASE-MODIFYING AGENTS     Explained to the patient and family that medications are intended to slow down the progression (in the early stages of the disease) and not stop it or reverse the disease. The disease is relentless, progressive and so far, cannot be controlled. Progression includes Cognitive decline, Behavioral disturbances, and Motor decline as well.     I counseled the patient and family that the rate of progression is extremely variable, and the average (10 years) is an inaccurate measure.     CLASSES:    NMDA RECEPTOR ANTAGONISTS    Continue Namenda 10 mg BID.       CHOLINESTERASE INHIBITORS (CEI)        Continue Aricept 10 mg po HS     ANTI-AMYLOID MONOCLONAL ANTIBODIES (SPECIFICALLY FOR ALZHEIMER-TYPE DEMENTIA)      Aducanumab (Aduhelm), Lecanemab (Leqembi) and Donanemab. The actual cognitive benefits remain unsubstantiated and the risks (including death) may outweigh the benefit.The method of administration and cost remain prohibitive as well.  Will consider such options after high quality studies and post marketing experiencing demonstrate significant clinical efficacy and safety.         MISCELLANEOUS     [facility] study regarding Sildenafil (Viagra) impact on dementia showed possible benefit. Will wait for high quality prospective  double-blinded placebo-controlled trials to make any proper recommendations.     Recommended against the use of OTC non-FDA evaluated supplements and claims.         SYMPTOMATIC MANAGEMENT-BEHAVIORAL SYMPTOMS AND NON-COGNITIVE SYMPTOMS       ANTIDEPRESSANTS CLASS MEDICATIONS    FUTURE CONSIDERATION:    Trazodone 50 mg QHS to help with insomnia. Instructed the family to watch for excessive sedation or paradoxical agitation. Other options includes Mirtazapine (Remeron).    Sertraline (Zoloft) titration to 100 mg in the morning can mitigate anxiety symptoms.     Bupropion (Wellbutrin) small dose titration  mg in the morning helps with attention and concentration in addition to depression and cravings.    ANTIEPILEPTIC CLASS MEDICATIONS     Lamotrigine (Lamictal) LTG 25 mg BID to help for agitation and mood stabilization and antiepileptic effect. Other options include: Valproic Acid (Depakote) VPA.      ANTIPSYCHOTIC AND NEUROLEPTIC CLASS MEDICATIONS     Risperdal 1 mg one hour before sunset to assist with sundowning , psychotic symptoms and behavioral disturbances. Other options include: Aripiprazole (Abilify),  Quetiapine (Seroquel) and  Brexipiprazole (Rixulti).                  HOME CARE       Falling Down Precautions. Gait is affected by the disease as well.     Avoid driving and access to firearms     Help with finances and decision making.    Join support group.    Proofing the house and use labeling.    Avoid antihistamines and anticholinergics.    Avoid changing routine.    Use written reminders.    Avoid multitasking.    Healthy diet, exercise (physical and cognitive).    Good sleep hygiene.        HERE ARE 10 WAYS TO REDUCE RISK OF DEMENTIA AND SLOW DOWN THE PROGRESSION OF DEMENTIA        1.Be physically active.    2. Avoid smoking and alcohol consumption.    3. Track your numbers. Keep your blood pressure, cholesterol, blood sugar and weight within recommended ranges.    4. Stay socially  connected.    5. Make healthy food choices. Eat a well-balance and healthy diet that rich in cereals, fish, legumes, and vegetables.    6. Reduce stress.     7. Challenge your brain by trying something new, playing games, or learning a new language.    8. Take care of your hearing. Avoid being continuously exposed to loud sounds and wear a hearing aid if hearing does become a problem.    9. Lower risk of falls. Consider installing handrails on all stairs and grab bars in bathrooms.    10. Reduce your exposure to air pollution, such as exposure to exhaust in heavy traffic.         CAREGIVERS COUNSELING     Recommend reading the following books:     The 36-Hour Day and there are many online and printed resources to help caregivers as well.     Alzheimer's Through the Stages.     Dementia with G.R.A.C.E.            PREVENTION OF DELIRIUM       1. Good hydration and avoid electrolyte imbalance  2. Recognize and treat infections immediately especially UTI.  3. Bladder emptying and prevent constipation.   4. Provide stimulating activities and familiar objects  5. Use eyeglasses and hearing aids if needed.   6. Use simple and regular communication about people, current place, and time  7. Mobility and range-of-motion exercises  8. Reduce noise, lighting and avoid sleep interruptions  9. Non-narcotic pain management.  10.Nondrug treatment for sleep problems or anxiety  11. Avoid antihistamines.  12. Avoid narcotics.  13. Avoid benzodiazepines.            HELPFUL STRATEGIES FOR THE FAMILY AND CAREGIVERS        BEHAVIORAL MANAGEMENT STRATEGIES     Remember that you cannot reason with acute psychosis or confusion. Unless there is an immediate safety issue, there is no need to challenge or correct the person.  For example, if a pt is hallucinating, do not argue with the person that what they are seeing is not real. If they are expressing paranoia, empathize gently with their fear, and attempt to redirect them to a different  "activity.   If the person is particularly stuck on a topic or activity, as long as the activity is safe and is not worsening their agitation, you don't need to intervene.     Communicate calmly, simply, and concisely    Reassure the person that they are safe and you are here to help  Try not to express irritation or anger  Speak quietly and calmly, do not shout or threaten the person. Avoid provocation.   Establish verbal contact and provide orientation and reassurance.  Attempt to identify the patient's wants and feelings, listen to what they are saying, and reflect those wants and feelings back to the patient.  Dont use sarcasm as a weapon    Set clear limits on behavior in a calm voice ("You cannot hit the nurse.")  Offer choices and optimism ("Which toothpaste would you like to use today?")    Redirect the person to an alternative behavior ("Can you come help me with this?)    Avoid saying things like, "We already went over this!" "You can't keep doing this." "I already explained this to you"  Instead, simply nod calmly and acknowledge what the person is saying ("Yes, that makes sense."), and then request their help or company in a different behavior.   Having a mental list of activities the patient enjoys can be helpful with redirection. For example, do they enjoy going for walks? Eating a piece of candy?   If redirection becomes challenging, you can place objects that your family member enjoys strategically in the home in order to use for distraction. This is particularly useful if there are items that they often talk about or frequently ask you questions about; or if they are visually striking. Once you focus the person on this object, talk about it briefly until they are calm and then attempt to redirect to a new behavior.   Sometimes activities which are repetitive can be calming, particularly if there is a comforting sensory element. For example, pt may enjoy folding soft towels or laundry.    Limit " "overstimulation    Decrease distractions by turning off the TV, computer, any fluorescent lights that hum, etc.  Ask any casual visitors to leave--the fewer people the better  If the person is very agitated, avoid touching them, and respect their personal space  Sit down and ask the person to sit down also     PREVENTATIVE STRATEGIES     Try to help the patient develop a routine and stick to it  Address all immediate safety issues  Does the person still have access to the car and keys? Take the keys away, or disconnect the car battery.  Are they wandering at night? Install locks on the outside doors. A keypad lock works well. Alarms on bedroom doors can also be helpful.   Are they turning on the stove and risking a fire? If you cannot limit their access to the kitchen, you may need to unplug dangerous devices any time you are not in the room.   If the person is exhibiting poor judgment throughout the day, they likely need someone supervising them at all times.   Do they still have access to significant financial assets? Limit their access to accounts, and consult with a  if necessary.   Identify situations that seem to trigger agitation or a problematic behavior, and modify the person's environment to minimize or eliminate that trigger.   For example, is their behavior worse if they don't get a good night's sleep? Or if they don't eat or drink enough? If so, prioritize those activities and build behavior plans to support them.  Do they get upset about not being allowed to answer the phone when it rings? Put all of the phones in the house on "silent."   Do they become paranoid that certain family members are trying to take advantage of them? Limit contact with the targeted family member as much as possible, and re-introduce them slowly after some time has passed.   Encourage independence in daily living whenever safely possible  Encourage collaborative decision-making regarding his care as well as daily " activities  Encourage regular physical exercise  Address issues that may be impacting sleep   Ex: Urology consult for frequent nighttime urination, address chronic pain that may be interfering with sleep, moving to a different room if his roommate snores loudly, make sure the room is a comfortable temperature and he has adequate pillows and blankets  Ensure he gets out into the sunlight at least once per day to encourage regular circadian cycle  No caffeine, sugar, or large meals within two hours of bedtime   Make sure room at night is dark and quiet and minimize nighttime interruptions from staff unless medically necessary   Try to help pt go to sleep and wake up at the same time every day  Monitor closely for worsening psychiatric symptoms (insomnia, social withdrawal, deterioration of personal hygiene, hostility, confusing or nonsensical speech, paranoia, hallucinations) and intervene promptly. Assess for possible antecedents, modify environment appropriately.            SLEEP HYGIENE     Poor sleep has a negative effect on cognition. Several strategies have been shown to improve sleep:     Caffeine intake in the afternoon and evening, as well as stuffing oneself at supper, can decrease the quality of restful sleep throughout the night.   Bedtime and wake-up times should be consistent every night and morning so the body becomes used to a single routine, even on the weekends.  Engage in daily physical activity, but not 2-3 hours before bedtime.   No technology use (television, computer, iPad) 1-2 hours before bed.   Have a wind down routine (e.g., soft lights in the house, bath before bed, reduced fluid intake, songs, reading, less noise) to promote sleep readiness.   Visit the www.sleepfoundation.org for more strategies.      COGNITIVE HYGIENE     Engage in regular exercise, which increases alertness and arousal and can improve attention and focus.  Consider lower impact exercises, such as yoga or light  walking.  Get a good nights sleep, as this can enhance alertness and cognition.  Eat healthy foods and balanced meals. It is notable that research indicates certain nutrients may aid in brain function, such as B vitamins (especially B6, B12, and folic acid), antioxidants (such as vitamins C and E, and beta carotene), and Omega-3 fatty acids. Talk with your physician or nutritionist about whats right for you.   Keep your brain active. Find activities to stay mentally active, such as reading, games (cards, checkers), puzzles (crosswords, Sudoku, jig saw), crafts (models, woodworking), gardening, or participating in activities in the community.  Stay socially engaged. Continue staying active with your family and friends.      FUTURE PLANNING     The patient and caregivers should consider formal arrangements to allow a designated person to make medical and financial decisions for the pt, should he/she become unable to do so.  Options to consider include designating a healthcare proxy, medical and/or financial power of , and completing advanced directives for healthcare decisions and estate planning (e.g., finalizing a will).  If cost is prohibitive, [facility] [employer] Services (https://Tactilize.PeopleJam/) provides free  for individuals with low income.       ADDITIONAL RESOURCES     [facility] the [facility] (www.http://alzbr.org) have good local caregiver and patient resources.   Consider resources for support through the [facility] Office of Elderly Affairs (http://goea.[place].gov/), [place] Chapter of the Alzheimers Association (www.alz.org/[place]/), the Family Caregiver [place] (www.caregiver.org), and the American Psychological Association (http://www.apa.org/pi/about/publications/caregivers/consumers/index.aspxconsumers/index.aspx).  For More Information About Hallucinations, Delusions, and Paranoia in Alzheimer's [facility] Alzheimer's and related Dementias Education and  Referral (ADEAR) Center 1-470.104.5738 (toll-free) adear@tab.nih.gov www.tab.nih.gov/alzheimers The National [place] on [facility]'s ADEAR Center offers information and free print publications about Alzheimer's disease and related dementias for families, caregivers, and health professionals. ADEAR Center staff answer telephone, email, and written requests and make referrals to local and national resources            MEDICAL/SURGICAL COMORBIDITIES     All relevant medical comorbidities noted and managed by primary care physician and medical care team.          MISCELLANEOUS MEDICAL PROBLEMS       HEALTHY LIFESTYLE AND PREVENTATIVE CARE    Encouraged the patient to adhere to the age-appropriate health maintenance guidelines including screening tests and vaccinations.     Discussed the overall importance of healthy lifestyle, optimal weight, exercise, healthy diet, good sleep hygiene and avoiding drugs including smoking, alcohol and recreational drugs. The patient verbalized full understanding.       Advised the patient to follow COVID-19 prevention measures.       I spent 130 minutes face to face with the patient    time spent in counseling and coordination of care including discussions etiology of diagnosis, pathogenesis of diagnosis, prognosis of diagnosis,, diagnostic results, impression and recommendations, diagnostic studies, management, risks and benefits of treatment, instructions of disease self-management, treatment instructions, follow up requirements, patient and family counseling/involvement in care compliance with treatment regimen. All of the patient's questions were answered during this discussion.       Nirav Del Valle, MSN NP      Collaborating Provider: Omega Lemon MD, FAAN Neurologist/Epileptologist

## 2024-02-08 ENCOUNTER — HOSPITAL ENCOUNTER (OUTPATIENT)
Dept: RADIOLOGY | Facility: HOSPITAL | Age: 84
Discharge: HOME | End: 2024-02-08
Attending: NURSE PRACTITIONER
Payer: MEDICARE

## 2024-02-08 DIAGNOSIS — F02.B18 MODERATE MAJOR NEUROCOGNITIVE DISORDER DUE TO ALZHEIMER'S DISEASE WITH BEHAVIORAL DISTURBANCE: ICD-10-CM

## 2024-02-08 DIAGNOSIS — R41.3 OTHER AMNESIA: ICD-10-CM

## 2024-02-08 DIAGNOSIS — F03.A0 MILD DEMENTIA WITHOUT BEHAVIORAL DISTURBANCE, PSYCHOTIC DISTURBANCE, MOOD DISTURBANCE, OR ANXIETY, UNSPECIFIED DEMENTIA TYPE: Chronic | ICD-10-CM

## 2024-02-08 DIAGNOSIS — R41.3 MEMORY DIFFICULTIES: ICD-10-CM

## 2024-02-08 DIAGNOSIS — Z86.73 HISTORY OF CVA (CEREBROVASCULAR ACCIDENT) WITHOUT RESIDUAL DEFICITS: ICD-10-CM

## 2024-02-08 DIAGNOSIS — G30.9 MODERATE MAJOR NEUROCOGNITIVE DISORDER DUE TO ALZHEIMER'S DISEASE WITH BEHAVIORAL DISTURBANCE: ICD-10-CM

## 2024-02-08 DIAGNOSIS — I67.2 CEREBRAL ATHEROSCLEROSIS: Chronic | ICD-10-CM

## 2024-02-08 PROCEDURE — 70551 MRI BRAIN STEM W/O DYE: CPT | Mod: 26,,, | Performed by: RADIOLOGY

## 2024-02-08 PROCEDURE — 70551 MRI BRAIN STEM W/O DYE: CPT | Mod: TC

## 2024-02-08 RX ORDER — DONEPEZIL HYDROCHLORIDE 10 MG/1
10 TABLET, FILM COATED ORAL NIGHTLY
Qty: 90 TABLET | Refills: 3 | Status: SHIPPED | OUTPATIENT
Start: 2024-02-08 | End: 2025-02-07

## 2024-02-08 NOTE — TELEPHONE ENCOUNTER
----- Message from Laura Cortez sent at 2/8/2024 10:30 AM CST -----  Contact: pt's wife/Radha  Type:  RX Refill Request    Who Called:  Radha  Refill or New Rx: refill  RX Name and Strength: donepeziL (ARICEPT) 10 MG tablet  How is the patient currently taking it? (ex. 1XDay):  Is this a 30 day or 90 day RX: 90 day  Preferred Pharmacy with phone number: [phone]  Local or Mail Order: local  Ordering Provider: dex  Would the patient rather a call back or a response via [facility]?   Best Call Back Number:  Additional Information:       [id number] - [place] - [id number] [facility]  [address] [facility]  [place] [id number]  \

## 2024-02-09 NOTE — PROGRESS NOTES
MRI BRAIN WO:    02-    No acute abnormality.  Moderately advanced atrophy and white matter degeneration.    Chronic left basal ganglia lacunar infarct with associated white matter degeneration extending into the left cerebral peduncle.    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      MRI showed multiple old strokes and moderate atrophy could been seen in patients with Vascular dementia . No acute findings

## 2024-02-09 NOTE — PROGRESS NOTES
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

## 2024-02-15 ENCOUNTER — HOSPITAL ENCOUNTER (OUTPATIENT)
Dept: RADIOLOGY | Facility: HOSPITAL | Age: 84
Discharge: HOME | End: 2024-02-15
Attending: NURSE PRACTITIONER
Payer: MEDICARE

## 2024-02-15 ENCOUNTER — CLINICAL SUPPORT (OUTPATIENT)
Dept: SPEECH THERAPY | Facility: HOSPITAL | Age: 84
End: 2024-02-15
Attending: NURSE PRACTITIONER
Payer: MEDICARE

## 2024-02-15 DIAGNOSIS — G30.9 MODERATE MAJOR NEUROCOGNITIVE DISORDER DUE TO ALZHEIMER'S DISEASE WITH BEHAVIORAL DISTURBANCE: ICD-10-CM

## 2024-02-15 DIAGNOSIS — F02.B18 MODERATE MAJOR NEUROCOGNITIVE DISORDER DUE TO ALZHEIMER'S DISEASE WITH BEHAVIORAL DISTURBANCE: ICD-10-CM

## 2024-02-15 DIAGNOSIS — R13.10 DYSPHAGIA, UNSPECIFIED TYPE: ICD-10-CM

## 2024-02-15 DIAGNOSIS — Z86.73 HISTORY OF CVA (CEREBROVASCULAR ACCIDENT) WITHOUT RESIDUAL DEFICITS: ICD-10-CM

## 2024-02-15 PROCEDURE — A9698 NON-RAD CONTRAST MATERIALNOC: HCPCS | Performed by: NURSE PRACTITIONER

## 2024-02-15 PROCEDURE — 92610 EVALUATE SWALLOWING FUNCTION: CPT | Performed by: SPEECH-LANGUAGE PATHOLOGIST

## 2024-02-15 PROCEDURE — 92611 MOTION FLUOROSCOPY/SWALLOW: CPT | Performed by: SPEECH-LANGUAGE PATHOLOGIST

## 2024-02-15 PROCEDURE — 74230 X-RAY XM SWLNG FUNCJ C+: CPT | Mod: 26,,, | Performed by: RADIOLOGY

## 2024-02-15 PROCEDURE — 74230 X-RAY XM SWLNG FUNCJ C+: CPT | Mod: TC

## 2024-02-15 PROCEDURE — 25500020 PHARM REV CODE 255: Performed by: NURSE PRACTITIONER

## 2024-02-15 PROCEDURE — 92526 ORAL FUNCTION THERAPY: CPT | Performed by: SPEECH-LANGUAGE PATHOLOGIST

## 2024-02-15 RX ADMIN — BARIUM SULFATE 20 ML: 0.81 POWDER, FOR SUSPENSION ORAL at 02:02

## 2024-02-15 NOTE — Clinical Note
Aretha Francis, I completed this patient's MBSS yesterday. His pharyngeal swallow is weak/inefficient resulting in buildup of residue across consistencies. I think he would benefit from dysphagia therapy to strengthen pharyngeal musculature. Do you mind placing this referral to speech therapy? Thanks! Suad

## 2024-02-15 NOTE — PROGRESS NOTES
See Modified Barium Swallow Study (MBSS) in plan of care.     Suad Velarde MA, CCC-SLP  Speech Language Pathologist  2/16/2024

## 2024-02-16 ENCOUNTER — TELEPHONE (OUTPATIENT)
Dept: NEUROLOGY | Facility: CLINIC | Age: 84
End: 2024-02-16
Payer: MEDICARE

## 2024-02-16 DIAGNOSIS — R13.10 DYSPHAGIA, UNSPECIFIED TYPE: Primary | ICD-10-CM

## 2024-02-16 NOTE — TELEPHONE ENCOUNTER
----- Message from Penny Del Valle NP sent at 2/16/2024  2:16 PM CST -----  I placed an order for ST to evaluate and treat, please inform patient   ----- Message -----  From: Suad Velarde CCC-SLP  Sent: 2/16/2024  11:18 AM CST  To: JUAN Galarza,  I completed this patient's MBSS yesterday. His pharyngeal swallow is weak/inefficient resulting in buildup of residue across consistencies. I think he would benefit from dysphagia therapy to strengthen pharyngeal musculature. Do you mind placing this referral to speech therapy?  Thanks!  Suad

## 2024-02-16 NOTE — PLAN OF CARE
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   Swallow and Oral Function Evaluation   5   Fl Modified Barium Swallow Speech  30   Dysphagia Therapy   15     Time in: 1:35 PM  Time out: 2:25 PM  Total Billable Time: 50 minutes    Radiation time: 4.1 minutes    Precautions: Standard, Fall, and Cognition  Subjective   History of Current Condition: Pravin Ferrari is a 83 y.o. male here today for Modified Barium Swallow Study (MBSS). Patient's medical history is significant for mild dementia without behavioral disturbance, cerebral atherosclerosis, aortic atherosclerosis, history of CVA (01/11/2023), hypertension, and history of prostate cancer. Patient's wife reports that his swallowing difficulty began ~7 months ago and have progressively worsened. She reports that he has "choking" instances during and shortly after meals, specifically with solids. She notes that the patient does take large bites/sips and does not implement any pacing during meals at this time. She and the patient deny that he has any history of pneumonia, unintentional weight loss, or history of GERD. Patient's wife reports that she has begun cutting his food into small bites/pieces for him and avoids breads which has helped some, but the "choking" still occurs.     In consideration of the interrelationships between body systems and swallowing, History was provided by patient, family, and/or taken " from chart review. The following are medical conditions present in the patient's history which can result in or be attributed to dysphagia:  Respiratory None noted in this category   Cardiovascular Atherosclerosis  Hypertension treated with ACE inhibitors   Hyperlipidemia   Digestive None noted in this category   Infections  None noted in this category   Urinary None noted in this category   Endocrine None noted in this category   Nervous cerebrovascular accident (CVA)   Dementia   Skeletal None noted in this category   Immune None noted in this category   Cancer History of prostate cancer   Psychiatric  None noted in this category   Congenital  None noted in this category   Other None noted in this category     -Current diet at home: Thin liquids (IDDSI 0) and Regular/Solid consistencies (IDDSI 7) that the pt's wife cuts into smaller pieces for him. They report avoiding breads.   -Recommended diet from previous study: NA  -Therapy received: Pt reports working with a speech therapist after his stroke who recommended alternating between bites and sips during meals, but has not been implementing this.     The following observations were made:   -Mental status: Alert and Cooperative  -Factors affecting performance: no difficulties participating in the study  -Feeding Method: independent in self-feeding    Respiratory Status:   -Respiratory Status: room air    Past Medical History: Pravin Ferrari  has a past medical history of Acute CVA (cerebrovascular accident) (1/11/2023), Cancer, [place] (hard of hearing), and Hypertension.  Pravin Ferrari  has a past surgical history that includes Prostate surgery; Eye surgery; Hernia repair; and Colonoscopy (N/A, 3/19/2019).    Medical Hx and Allergies:   Review of patient's allergies indicates:   Allergen Reactions    Lisinopril Swelling       Relevant office visits: NA    Relevant Imaging:    MRI BRAIN WITHOUT CONTRAST on 02/08/2024:      COMPARISON:  MRI 01/11/2023    "  FINDINGS:  Mild-to-moderate atrophy is present.  There is a chronic left lacunar infarct with white matter degeneration extending into the left cerebral peduncle.  There are pontine white matter changes as well as periventricular and subcortical white matter changes.  Small chronic right periventricular white matter lacunar infarct.  No acute restricted diffusion.  Pituitary gland region appears normal.  No significant chronic hemorrhage.  Skull base appears normal.  The diffusion sequence is negative.     Impression:  No acute abnormality.  Moderately advanced atrophy and white matter degeneration.  Chronic left basal ganglia lacunar infarct with associated white matter degeneration extending into the left cerebral peduncle.  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     Electronically signed by: Ezequiel Coronel MD  Date:                                            02/08/2024    Objective     Modified Barium Swallow Study  Purpose: to evaluate anatomy and physiology of the oropharyngeal swallow, to determine effectiveness of rehabilitation strategies, and to determine diet consistency and intervention recommendations. The study was performed using the "Gold Standard" of 30 fps with as low as reasonably achievable (ALARA) exposure.     The patient was seen in radiology seated in [place]'s position in a video imaging chair for lateral views of the larynx and an A/P view. The study was conducted using Varibar thin liquid (IDDSI 0), Varibar nectar liquid (IDDSI 2), Varibar pudding (IDDSI 4), Peaches mixed with Varibar thin liquid (IDDSI 6/0), and solid coated in Varibar pudding (IDDSI 7). He tolerated the procedure well.    A cranial nerve evaluation revealed:   Cranial Nerve Examination  Cranial Nerve 5: Trigeminal Nerve  Motor Jaw Posture at rest: Closed  Mandible Elevation/Depression: WFL  Mandible lateralization: WFL  Abnormal movement: absent Interpretation: Intact bilaterally  "   Sensory Forehead: WFL  Cheek: WFL  Jaw: WFL  Facial Pain: None noted Interpretation: Intact bilaterally      Cranial Nerve 7: Facial Nerve  Motor Facial Symmetry: WNL  Wrinkle Forehead: WFL  Close eyes tightly: WFL  Labial Protrusion: WFL  Labial Retraction: WFL  Buccal Strength with Labial Seal: WFL  Abnormal movement: absent Interpretation: Intact bilaterally    Sensory Formal testing not completed.       Cranial Nerves IX and X: Glossopharyngeal and Vagus Nerves  Motor Palatal Symmetry (Rest): WNL  Palatal Symmetry (Movement): WNL  Cough: Perceptually strong  Voice Prior to PO intake: Clear  Resonance: Normal  Abnormal movement: absent Interpretation: Intact bilaterally      Cranial Nerve XII: Hypoglossal Nerve  Motor Tongue at rest: WNL  Lingual Protrusion: WNL  Lingual Protrusion against Resistance: WNL  Lingual Lateralization: WNL  Abnormal movement: absent Interpretation: Intact bilaterally      Other information:  Volitional Swallow: Able to palpate laryngeal rise  Mucosal Quality: No abnormal findings  Secretion Management: no overt deficits noted/observed  Dentition: Good condition for speech and mastication        Consistency  Presentation  Findings Strategy Attempted Rosenbeck's Penetration/Aspiration Scale (PAS)   Thin (IDDSI 0) Method: Self-fed    Volume: cup sip x5,  sequential sips    Projection: lateral view; AP view  Oral phase: WFL, patient consistent large sip size    Pharyngeal phase: Characterized by two instance of trace transient penetration during the swallow and trace deep penetration during the swallow of sequential sips. Patient with trace-mild amounts of residue at the vallecula and pyriform sinuses that is reduced with a cued sequential swallow.    Esophageal screen: Significant for aerophagia with trace residue at the medial esophagus that remains after one minute.    Cued Second Swallow:  Reduced residue at vallecula and pyriform sinuses.    Small sip:   Effective for eliminating  penetration during the swallow. No aspiration appreciated.  Best: (1) Material does not enter the airway    Worst: (4) Material enters the airway, contacts the vocal folds, and is ejected from the airway     Nectar thick (mildly thick/IDDSI 2) Method:Self-fed    Volume: cup sip x2     Projection: lateral view Oral phase: WFL, patient with consistent large sip size    Pharyngeal phase: Characterized by one instance of transient penetration during the swallow with no aspiration appreciated. Consistent minimal residue at the vallecula, posterior pharyngeal wall, and pyriform sinuses that is reduced at the posterior pharyngeal wall and pyriform sinuses with a cued dry swallow, but does not reduce vallecula.    Small sip:   Effective for eliminating penetration during the swallow. No aspiration appreciated.  Best: (1) Material does not enter the airway    Worst: (2) Material enters the airway, remains above the vocal folds, and is ejected from the airway     Puree (extremely thick/ IDDSI 4) Method: Self-fed    Volume: tsp/tbsp bite x2     Projection: lateral view Oral phase: WFL    Pharyngeal phase: No appreciated aspiration or penetration. Significant for moderate-severe residue at the pyriform sinuses and vallecula. With a cued sequential swallow the residue at the pyriform sinuses reduces to trace amounts, but there is still a moderate amount of residue at the vallecula.      Esophageal screen: Characterized by timley and efficent clearance with aerophagia.     Best: (1) Material does not enter the airway    Worst: (1) Material does not enter the airway     Solid (regular/ IDDSI 7) Method: Self-fed    Volume: bite x3     Projection: lateral view, AP view Oral phase: Significant for prolonged mastication and piecemeal swallow.      Pharyngeal phase: No penetration or aspiration apreciated. Significant for moderate-severe residue at the vallecula and pyriform sinuses. When patient initiates a spontaneous sequential  swallow this reduces residue at pyirfom sinuses to trace amounts. The resiude at the valleucla does not reduce or clear with a spontaneous sequential swallow, cued effortful swllow, chin tuck, or liquid wash. This residue does not clear/reduce unitl the presentation of the next bolus.      Best: (1) Material does not enter the airway    Worst: (1) Material does not enter the airway     Mixed consistency (thin/ IDDSI 0 + soft and bite sized/ IDDSI 6) Method: Self-fed    Volume: bite x2     Projection: lateral view Oral phase: Patient with piecemeal swallow and prolonged mastication of the solid (IDDSI 7).      Pharyngeal phase: Characterized by minmal residue at the vallecula and pyriform sinuses that is reduced by a cued sequential swallow.      Best: (1) Material does not enter the airway    Worst: (1) Material does not enter the airway     Barium tablet  Method: Self-fed    Volume: single tablet with water bolus(es)    Projection: AP view Tablet is retained at the vallecula and required multiple cued swallows to clear. Once clears pharynx, timely clearance from esophageal screening.          Treatment   Treatment Time In: 2:10 PM  Treatment Time Out: 2:25 PM  Total Treatment Time: 15 mins  Patient educated regarding results and recommendations of the evaluation. See the recommendations section below.    Education: Plan of Care, role of SLP in care, and aspiration precautions, and anatomy and physiology of swallow mechanism as it relates to MBSS findings and recommendations were discussed with the patient. Patient expressed understanding. All questions were answered.     Assessment     Pravin Ferrari is a 83 y.o. 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  The patient presents with Mild-Moderate pharyngeal dysphagia as determined by the Dysphagia Outcome and Severity Scale (MINDY). Level 4: Mild-Moderate Dysphagia.    Modified Barium Swallow Study (MBSS) revealed oral phase characterized by adequate lingual and labial strength and range of motion for tongue control, bolus preparation and transport. Lip closure was adequate with no labial escape. Bolus prep and mastication was timely and efficient, but prolonged for the moderately thick/puree consistency and solid/regular consistency with large bite size. Lingual motion was brisk for adequate bolus transport. There was no significant oral residue. The swallow was initiated when the head of the bolus passed the ramus of the mandible.    Pharyngeal phase characterized by timely initiation of swallow across consistencies. The soft palate elevated for complete closure of the velopharyngeal port. During pharyngeal swallow, reduced base of tongue retraction, anterior hyoid excursion, laryngeal elevation, and pharyngeal stripping wave resulted in two instances of trace transient penetration and one instance of trace deep penetration all during the swallow of thin (IDDSI 0) liquid. This also caused minimal-severe amounts of residue to remain at the vallecula, posterior pharyngeal wall, and pyriform sinuses. The residue at the pyriform sinuses and posterior pharyngeal wall was able to be reduced or clerared with a cued or spontaneous sequential swallow. Specifically, the residue at the vallecula after the intake of puree/moderately thick and regular/solid consistencies (IDDSI 4/7) was not able to be cleared with any strategy implemented until the presentation of the next bolus consistency. No aspiration was observed in today's study.     Robust Esophageal Screening Test (REST) was used to screen esophageal phase of swallow (Jodie et al, 2021) in today's study, completed in anterior/posterior view, with intake of barium coated  solid, large self-regulated sips of liquid, and barium tablet. Screening significant for aerophagia and trace amounts of thin liquid (IDDSI 0) retained at the medial esophagus. No further esophageal imaging recommended at this time, should patient desire further assessment, they are recommended to follow-up with GI.      Impressions: Patient presents with mild-moderate pharyngeal dysphagia, likely chronic related to pharyngeal weakness from CVA, presbyphagia, and cognitive impacts on the swallow secondary to dementia. Patient presents with consistent residue at the vallecula that is not able to be cleared despite cued sequential swallows, liquid wash, or chin tuck which is indicative of tongue base weakness. The patient also consistently took large bites/sips throughout the assessment. These large sips of thin liquid (IDDSI 0) resulted in trace transient and deep penetration during the swallow. When the patient was cued to take small sips, this elimintaed penetration. No aspiration was appreciated throughout the study, but due to the large amounts of residue due to weakness of pharyngeal musculature this could pose a safety concern in the furture. In consideration of the Dynamic Imaging Grade of Swallowing Toxicity (DIGEST) (Hamilton et al, 2017), patient presents with mild safety impairment and severe efficiency impairment. Patient appears to be at low risk for aspiration related PNA in consideration of three pillars of aspiration pneumonia ([place], 2005) including oral health status, overall health/immune status, and laryngeal vestibule closure/severity of dysphagia. However, unable to assess risk related to aspiration pneumonia cause by the aspiration of gastric content. Patient appears to be a good candidate for behavioral swallow rehabilitation to strengthen pharyngeal musculature. Throughout the assessment, patient responded well to and was able to implement all cued strategies and therefore it is  appreciated he will be able to implement and engage in therapy despite progressive cognitive changes. The patient also should implement and be cued to pace during meals, take small bites/sips, take one bite/sip at a time, and alternate between bites and sips throughout a meal. These strategies will reduce residue in the pharyngeal cavity and allow for the patient to have increased bolus control to eliminate penetration and increase safety and efficiency.     Functional Oral Intake Scale (FOIS)  The Functional Oral Intake Scale (FOIS) is an ordinal scale that is used to assess the current status and meaningful change in the oral intake. FOIS levels include:    TUBE DEPENDENT (levels 1-3) 1. No oral intake  2. Tube dependent with minimal/inconsistent oral intake  3. Tube supplements with consistent oral intake      TOTAL ORAL INTAKE (levels 4-7) 4. Total oral intake of a single consistency  5. Total oral intake of multiple consistencies requiring special preparation  6. Total oral intake with no special preparation, but must avoid specific foods or liquid items  7. Total oral intake with no restrictions     Patient is currently judged to be at FOIS level 7.      References:  JULIO C Subramanian (2005, March). Pneumonia: Factors Beyond Aspiration. Perspectives in Swallowing and Swallowing Disorders (Dysphagia), 14, 10-16.  Joellen KA, Pricila MP, Ishaan DA, Bobo JK, Adelina HY, Luciano RS, Waylon CD, Jhon SY, Wilbur CP, Shaka J, Lazarus CL, May A, Johnny J, Levi JW, Roby HM, Juancarlos JS. Dynamic Imaging Grade of Swallowing Toxicity (DIGEST): Scale development and validation. Cancer. 2017 Jan 1;123(1):62-70. doi: 10.1002/cncr.91000. Epub 2016 Aug 26. PMID: 22595283; PMCID: [id number].  JULIA Feliciano., JULIO C Calderon, JOSEPH Alvarado, & JULIO C Cordero (2021). Diagnostic Accuracy of an Esophageal Screening Protocol Interpreted by the Speech-Language Pathologist. Dysphagia, 36(6), 1988-0086.  [email or web address]    Recommendations:     Consistency Recommendations: Thin liquids (IDDSI 0) and Solid/Regular consistencies (IDDSI 7).  Medications should be taken whole in thin liquids.   Risk Management/Swallow Guidelines: use good oral hygiene, sit upright for all PO intake, increase physical mobility as tolerated, feed only when wake and alert, small bites and sips, allow extra time for meals, and encourage volitional dry swallows and coughs throughout meals  Specialist Referrals: NA  Ancillary Tests: NA   Therapy: Dysphagia therapy is recommended including Mendelsohn, Edith, and Effortful swallow.  Follow-up exam: Follow up swallow study is not indicated at this time.    Please contact [facility] at [phone] if there are questions re: the above or if we can be of additional service to this patient.    Dee Mtz MA CF-SLP  Speech Language Pathologist  2/15/2024     Suad Velarde MA, CCC-SLP  Speech Language Pathologist  2/15/2024

## 2024-02-21 ENCOUNTER — CLINICAL SUPPORT (OUTPATIENT)
Dept: REHABILITATION | Facility: HOSPITAL | Age: 84
End: 2024-02-21
Attending: NURSE PRACTITIONER
Payer: MEDICARE

## 2024-02-21 DIAGNOSIS — R13.10 DYSPHAGIA, UNSPECIFIED TYPE: ICD-10-CM

## 2024-02-21 DIAGNOSIS — R13.13 PHARYNGEAL DYSPHAGIA: Primary | ICD-10-CM

## 2024-02-21 PROCEDURE — 92526 ORAL FUNCTION THERAPY: CPT | Performed by: SPEECH-LANGUAGE PATHOLOGIST

## 2024-02-21 NOTE — PLAN OF CARE
"[facility]  Speech Therapy Evaluation -Dysphagia    Date: 2/21/2024     Name: Pravin Ferrari   MRN: [id number]    Therapy Diagnosis:   Encounter Diagnosis   Name Primary?    Dysphagia, unspecified type     Physician: Penny Del Valle NP  Physician Orders: Ambulatory Referral to Speech Therapy   Medical Diagnosis: Dysphagia, unspecified type [R13.10]     Visit # / Visits Authorized:  1 / 1   Date of Evaluation:  2/21/2024   Insurance Authorization Period: 2/16/2024 - 12/31/2024   Plan of Care Certification:    2/21/2024 to 5/1/24      Time In: 9:01 AM   Time Out: 9:42 AM   Total time: 41 mins     Procedure   Dysphagia Therapy     Precautions: Standard, Fall, Cognition, Communication, and Dysphagia  Subjective     History of Current Condition:  Pravin Ferrari is a 83 y.o. male who presents to [facility] Out[facility] Speech Therapy for evaluation secondary to Dysphagia, unspecified type [R13.10]. Patient was referred to therapy by Penny Del Valle NP, which is the patient's neurology provider. Patient's medical history is significant for mild dementia without behavioral disturbance, cerebral atherosclerosis, aortic atherosclerosis, history of CVA (01/11/2023), hypertension, and history of prostate cancer. Patient's wife reports that his swallowing difficulty began ~7 months ago and have progressively worsened. She reports that he has "choking" instances during and shortly after meals, specifically with solids. She notes that the patient does take large bites/sips and does not implement any pacing during meals at this time. She and the patient deny that he has any history of pneumonia, unintentional weight loss, or history of GERD. Patient's wife reports that she has begun cutting his food into small bites/pieces for him and avoids breads which has helped some, but the "choking" still occurs.     Patient completed MBSS with me on 2/15/24; see findings/recommendations below.      In " consideration of the interrelationships between body systems and swallowing, History was provided by patient, family, and/or taken from chart review. The following are medical conditions present in the patient's history which can result in or be attributed to dysphagia:  Respiratory None noted in this category   Cardiovascular Atherosclerosis  Hypertension treated with ACE inhibitors   Hyperlipidemia   Digestive None noted in this category   Infections  None noted in this category   Urinary None noted in this category   Endocrine None noted in this category   Nervous cerebrovascular accident (CVA)   Dementia   Skeletal None noted in this category   Immune None noted in this category   Cancer History of prostate cancer   Psychiatric  None noted in this category   Congenital  None noted in this category   Other None noted in this category      -Current diet at home: Thin liquids (IDDSI 0) and Regular/Solid consistencies (IDDSI 7) that the pt's wife cuts into smaller pieces for him. They report avoiding breads.   -Recommended diet from previous study: NA  -Therapy received: Pt reports working with a speech therapist after his stroke who recommended alternating between bites and sips during meals, but has not been implementing this.       Past Medical History: Pravin Ferrari  has a past medical history of Acute CVA (cerebrovascular accident) (1/11/2023), Cancer, [place] (hard of hearing), and Hypertension.  Pravin Ferrari  has a past surgical history that includes Prostate surgery; Eye surgery; Hernia repair; and Colonoscopy (N/A, 3/19/2019).  Medical Hx and Allergies: Pravin has a current medication list which includes the following prescription(s): amlodipine, aspirin, atorvastatin, clopidogrel, donepezil, memantine, multivitamin, and [DISCONTINUED] hydrochlorothiazide.   Review of patient's allergies indicates:   Allergen Reactions    Lisinopril Swelling     Prior Therapy:  unknown  Social History:  Patient lives with  his wife in [place], Patient is not currently driving  Occupation:  retired specialized aerial photography  Prior Level of Function: WFL   Current Level of Function: dysphagia impacting safety/efficiency of PO intake to meet nutrition and hydration needs   Pain Scale: no pain indicated throughout session  Patient's Therapy Goals:  improve swallow functioning     Objective   Formal Assessment:    Cranial Nerve/Laryngeal Function Exam:  A cranial nerve evaluation revealed:   Cranial Nerve Examination  Cranial Nerve 5: Trigeminal Nerve  Motor Jaw Posture at rest: Closed  Mandible Elevation/Depression: WFL  Mandible lateralization: WFL  Abnormal movement: absent Interpretation: Intact bilaterally    Sensory Forehead: WFL  Cheek: WFL  Jaw: WFL  Facial Pain: None noted Interpretation: Intact bilaterally       Cranial Nerve 7: Facial Nerve  Motor Facial Symmetry: WNL  Wrinkle Forehead: WFL  Close eyes tightly: WFL  Labial Protrusion: WFL  Labial Retraction: WFL  Buccal Strength with Labial Seal: WFL  Abnormal movement: absent Interpretation: Intact bilaterally    Sensory Formal testing not completed.         Cranial Nerves IX and X: Glossopharyngeal and Vagus Nerves  Motor Palatal Symmetry (Rest): WNL  Palatal Symmetry (Movement): WNL  Cough: Perceptually strong  Voice Prior to PO intake: Clear  Resonance: Normal  Abnormal movement: absent Interpretation: Intact bilaterally       Cranial Nerve XII: Hypoglossal Nerve  Motor Tongue at rest: WNL  Lingual Protrusion: WNL  Lingual Protrusion against Resistance: WNL  Lingual Lateralization: WNL  Abnormal movement: absent Interpretation: Intact bilaterally       Other information:  Volitional Swallow: Able to palpate laryngeal rise  Mucosal Quality: No abnormal findings  Secretion Management: no overt deficits noted/observed  Dentition: Good condition for speech and mastication      Clinical Swallow Evaluation:   Predisposing dysphagia risk factors: history of CVA, dementia  "  Clinical signs of possible chronic dysphagia: reported coughing/"choking" during meals   Precipitating dysphagia risk factors: NA    EAT-10 Score:    Eating Assessment Tool (EAT-10) is a questionnaire given to the patient to  his swallowing function and quality of life as it relates to patient's perceived swallowing deficits. A score that is greater than 3 may be indicative of swallowing problems. (Slade et al., 2008); higher scores correlate with increased penetration-aspiration  scale scores, and scores greater than 15 results in the patient being 2.2 times more likely to aspirate (Janett et al., 2015)    My swallowing problem has caused me to lose weight.  0   2.   My swallowing problem interferes with my ability to go out for meals.  0   3.   Swallowing liquids takes extra effort. 0   4.   Swallowing solids takes extra effort.  0   5.   Swallowing pills takes extra effort.  0   6.   Swallowing is painful.  0   7.   The pleasure of eating is affected by my swallowing.  0   8.   When I swallow, food sticks in my throat.  0   9.   I cough when I eat.  3   10. Swallowing is stressful. 1   TOTAL 4/40     References:   Slade, NICKY. GRACE., ARTUR Anguiano., BUSHRA Carlos., JULIO C Dunbar., Olga, G. N., Srinivas, DANA., & Christian, LUCILLE CORTEZ. (2008). Validity and reliability of the Eating Assessment Tool (EAT-10). Annals of Otology, Rhinology & Laryngology, 117(12), 919-924. [email or web address]  ARTUR Rowland., LISA Hidalgo., JULIO C Mustafa, Satish, M. A., & Slade, P. GRACE. (2015). The ability of the 10-item eating assessment tool (EAT-10) to predict aspiration risk in persons with dysphagia. Annals of Otology, Rhinology & Laryngology, 124(5), 351-354. [email or web address]      Modified Barium Swallow Study, 2/15/24, Suad Velarde CCC-SLP and Dee Mtz -SLP:  Pravin Ferrari is a 83 y.o. 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 The patient presents with Mild-Moderate pharyngeal dysphagia as determined by the Dysphagia Outcome and Severity Scale (MINDY). Level 4: Mild-Moderate Dysphagia.     Modified Barium Swallow Study (MBSS) revealed oral phase characterized by adequate lingual and labial strength and range of motion for tongue control, bolus preparation and transport. Lip closure was adequate with no labial escape. Bolus prep and mastication was timely and efficient, but prolonged for the moderately thick/puree consistency and solid/regular consistency with large bite size. Lingual motion was brisk for adequate bolus transport. There was no significant oral residue. The swallow was initiated when the head of the bolus passed the ramus of the mandible.     Pharyngeal phase characterized by timely initiation of swallow across consistencies. The soft palate elevated for complete closure of the velopharyngeal port. During pharyngeal swallow, reduced base of tongue retraction, anterior hyoid excursion, laryngeal elevation, and pharyngeal stripping wave resulted in two instances of trace transient penetration and one instance of trace deep penetration all during the swallow of thin (IDDSI 0) liquid. This also caused minimal-severe amounts of residue to remain at the vallecula, posterior pharyngeal wall, and pyriform sinuses. The residue at the pyriform sinuses and posterior pharyngeal wall was able to be reduced or clerared with a cued or spontaneous sequential swallow. Specifically, the residue at the vallecula after the intake of puree/moderately thick and regular/solid consistencies (IDDSI 4/7) was not able to be cleared with any strategy implemented until the presentation of the next bolus consistency. No aspiration was observed  in today's study.      Robust Esophageal Screening Test (REST) was used to screen esophageal phase of swallow (Jodie et al, 2021) in today's study, completed in anterior/posterior view, with intake of barium coated solid, large self-regulated sips of liquid, and barium tablet. Screening significant for aerophagia and trace amounts of thin liquid (IDDSI 0) retained at the medial esophagus. No further esophageal imaging recommended at this time, should patient desire further assessment, they are recommended to follow-up with GI.     Impressions: Patient presents with mild-moderate pharyngeal dysphagia, likely chronic related to pharyngeal weakness from CVA, presbyphagia, and cognitive impacts on the swallow secondary to dementia. Patient presents with consistent residue at the vallecula that is not able to be cleared despite cued sequential swallows, liquid wash, or chin tuck which is indicative of tongue base weakness. The patient also consistently took large bites/sips throughout the assessment. These large sips of thin liquid (IDDSI 0) resulted in trace transient and deep penetration during the swallow. When the patient was cued to take small sips, this elimintaed penetration. No aspiration was appreciated throughout the study, but due to the large amounts of residue due to weakness of pharyngeal musculature this could pose a safety concern in the furture. In consideration of the Dynamic Imaging Grade of Swallowing Toxicity (DIGEST) (Hamilton et al, 2017), patient presents with mild safety impairment and severe efficiency impairment. Patient appears to be at low risk for aspiration related PNA in consideration of three pillars of aspiration pneumonia (Poulan, 2005) including oral health status, overall health/immune status, and laryngeal vestibule closure/severity of dysphagia. However, unable to assess risk related to aspiration pneumonia cause by the aspiration of gastric content. Patient appears to be a good  candidate for behavioral swallow rehabilitation to strengthen pharyngeal musculature. Throughout the assessment, patient responded well to and was able to implement all cued strategies and therefore it is appreciated he will be able to implement and engage in therapy despite progressive cognitive changes. The patient also should implement and be cued to pace during meals, take small bites/sips, take one bite/sip at a time, and alternate between bites and sips throughout a meal. These strategies will reduce residue in the pharyngeal cavity and allow for the patient to have increased bolus control to eliminate penetration and increase safety and efficiency.      Functional Oral Intake Scale (FOIS)  The Functional Oral Intake Scale (FOIS) is an ordinal scale that is used to assess the current status and meaningful change in the oral intake. FOIS levels include:     TUBE DEPENDENT (levels 1-3) 1. No oral intake  2. Tube dependent with minimal/inconsistent oral intake  3. Tube supplements with consistent oral intake       TOTAL ORAL INTAKE (levels 4-7) 4. Total oral intake of a single consistency  5. Total oral intake of multiple consistencies requiring special preparation  6. Total oral intake with no special preparation, but must avoid specific foods or liquid items  7. Total oral intake with no restrictions      Patient is currently judged to be at FOIS level 7.       References:  JULIO C Subramanian. (2005, March). Pneumonia: Factors Beyond Aspiration. Perspectives in Swallowing and Swallowing Disorders (Dysphagia), 14, 10-16.  Joellen KA, Pricila MP, Ishaan DA, Bobo CORTEZK, Adelina HY, Luciano RS, Waylon CD, Jhon SY, Wilbur CP, Shaka J, Lazarus CL, May A, Johnny J, Levi JW, Roby HM, Juancarlos JS. Dynamic Imaging Grade of Swallowing Toxicity (DIGEST): Scale development and validation. Cancer. 2017 Jan 1;123(1):62-70. doi: 10.1002/cncr.15786. Epub 2016 Aug 26. PMID: 67218323; PMCID: [id number].  JUANCHO Feliciano, JULIO C Calderon, Christiano  JOSEPH, & JULIO C Cordero (2021). Diagnostic Accuracy of an Esophageal Screening Protocol Interpreted by the Speech-Language Pathologist. Dysphagia, 36(6), 0170-5586. [email or web address]      Treatment   Total Treatment Time Separate from Evaluation: 41 minutes   Treatment included the following:  Discussion of MBSS findings and recommendations for therapy/exercises recommended  Discussion of exercises recommended including form, frequency of completion with SLP demonstration and patient execution. Exercises all reviewed with patient's wife and daughter as well.     Education provided:   -role of Speech Therapy, goals/plan of care, scheduling/cancellations, insurance limitations with patient  -Additional Education provided:   Results of swallow study  Swallow physiology  Swallow exercises  Reflux precautions    Patient and family members expressed understanding.     Home Program: see education/exercises above.  Assessment     Pravin presents to [facility] status post medical diagnosis of Dysphagia, unspecified type [R13.10].     Interpretation of objective assessment:   He presents with, per MBSS completed 2/15/24, mild-moderate pharyngeal dysphagia, likely chronic related to pharyngeal weakness from CVA, presbyphagia, and cognitive impacts on the swallow secondary to dementia. Patient presents with consistent residue at the vallecula that is not able to be cleared despite cued sequential swallows, liquid wash, or chin tuck which is indicative of tongue base weakness. The patient also consistently took large bites/sips throughout the assessment. These large sips of thin liquid (IDDSI 0) resulted in trace transient and deep penetration during the swallow. When the patient was cued to take small sips, this elimintaed penetration. No aspiration was appreciated throughout the study, but due to the large amounts of residue due to weakness of pharyngeal musculature this could pose a safety  concern in the furture. In consideration of the Dynamic Imaging Grade of Swallowing Toxicity (DIGEST) (Hamilton et al, 2017), patient presents with mild safety impairment and severe efficiency impairment. Patient appears to be at low risk for aspiration related PNA in consideration of three pillars of aspiration pneumonia (Moris, 2005) including oral health status, overall health/immune status, and laryngeal vestibule closure/severity of dysphagia. However, unable to assess risk related to aspiration pneumonia cause by the aspiration of gastric content. Patient appears to be a good candidate for behavioral swallow rehabilitation to strengthen pharyngeal musculature. Throughout the assessment, patient responded well to and was able to implement all cued strategies and therefore it is appreciated he will be able to implement and engage in therapy despite progressive cognitive changes. The patient also should implement and be cued to pace during meals, take small bites/sips, take one bite/sip at a time, and alternate between bites and sips throughout a meal. These strategies will reduce residue in the pharyngeal cavity and allow for the patient to have increased bolus control to eliminate penetration and increase safety and efficiency.     Demonstrates impairments including limitations as described in the problem list.     Positive prognostic factors: family support   Negative prognostic factors: dementia   Barriers to therapy: No barriers to therapy identified.     Patient's spiritual, cultural, and educational needs considered and patient agreeable to plan of care and goals.    Patient will benefit from skilled therapy.    Rehab Potential: good    Short Term Goals: (5 weeks) Current Progress:   Patient will independently demonstrate adequate use of effortful swallow technique to improve tongue base retraction and therefore overall swallow strength, safety and efficiency 60-75x per session.     Progressing/ Not  Met 2/21/2024   Established this date   Patient will complete Mendelsohn maneuver swallow strengthening exercise to increase hyolaryngeal elevation and excursion and therefore improve airway protection with minimal verbal cueing 20-30x per session.      Progressing/ Not Met 2/21/2024   Established this date   Patient will complete CTAR/Shaker swallow strengthening exercise to improve pharyngeal constriction and therefore UES opening with minimal verbal cueing for 3, 1-minute holds and 30-50 repetitions.      Progressing/ Not Met 2/21/2024   Established this date    Patient will complete Edith exercise to improve pharyngeal stripping wave/constriction with minimal verbal cueing 20-30 times per session.      Progressing/ Not Met 2/21/2024   Established this date    Patient will demonstrate the ability to adequately self-monitor swallowing skills and perform appropriate compensatory techniques with 90% accuracy to improve swallow safety and/or efficiency.      Progressing/ Not Met 2/21/2024   Established this date    Patient and/or family will be educated on and implement adequate oral hygiene independently 2-3 times per day.      Progressing/ Not Met 2/21/2024   Established this date    Patient and/or family will participate in dysphagia education including anatomy and physiology of swallow mechanism, clinical signs of aspiration, etc. with returned demonstration of information.      Progressing/ Not Met 2/21/2024   Established this date        Long Term Goals: (10 weeks) Current Progress:   Patient will maintain adequate hydration/nutrition with optimum safety and efficiency of swallowing function on PO intake for IDDSI 7/0 diet level.    Established this date         Plan     Recommended Treatment Plan:  Patient will participate in the [facility] program for speech therapy 1 times per week for 10 weeks to address his Swallow deficits, to educate patient and their family, and to participate in a home  exercise program.    Other Recommendations:   NA    Therapist's Name:   Suad Velarde MA, CCC-SLP  Speech Language Pathologist  2/21/2024

## 2024-02-21 NOTE — PROGRESS NOTES
See initial evaluation in plan of care.     Suad Velarde MA, CCC-SLP  Speech Language Pathologist  2/21/2024

## 2024-02-28 ENCOUNTER — CLINICAL SUPPORT (OUTPATIENT)
Dept: REHABILITATION | Facility: HOSPITAL | Age: 84
End: 2024-02-28
Payer: MEDICARE

## 2024-02-28 DIAGNOSIS — R13.13 PHARYNGEAL DYSPHAGIA: Primary | ICD-10-CM

## 2024-02-28 PROCEDURE — 92526 ORAL FUNCTION THERAPY: CPT | Performed by: SPEECH-LANGUAGE PATHOLOGIST

## 2024-02-28 NOTE — PROGRESS NOTES
[facility]  Speech Therapy Treatment Note- Dysphagia  Date: 2/28/2024     Name: Pravin Ferrari   MRN: [id number]   Therapy Diagnosis:   Encounter Diagnosis   Name Primary?    Pharyngeal dysphagia Yes   Physician: Penny Del Valle NP  Physician Orders: Ambulatory Referral to Speech Therapy   Medical Diagnosis: Dysphagia, unspecified type [R13.10]     Visit #/ Visits Authorized: 1 / 25 (+eval)  Date of Evaluation:  2/21/24  Insurance Authorization Period:  2/21/2024 - 12/31/2024   Plan of Care Expiration Date:    5/1/24  Extended Plan of Care:  -   Progress Note: 3/21/24     Time In:  9:03 AM  Time Out:  9:45 AM  Total Billable Time: 42 mins      Precautions: Standard, Fall, Cognition, and Dysphagia  Subjective:   Patient reports: tried to do exercises at home this week, some question about correct form.   He was compliant to home exercise program.     Response to previous treatment: first treatment session    Pain Scale: no pain indicated throughout session  Objective:   TIMED  Procedure Min.   Cognitive Therapeutic Interventions, first 15 minutes CPT 25834  0   Cognitive Therapeutic Interventions, each additional 15 minutes CPT 54574  0         UNTIMED  Procedure   Dysphagia Therapy     Short Term Goals: (5 weeks) Current Progress:   Patient will independently demonstrate adequate use of effortful swallow technique to improve tongue base retraction and therefore overall swallow strength, safety and efficiency 60-75x per session.      Progressing/ Not Met 2/28/2024  Patient and his wife were educated regarding rationale for exercise with correct form/frequency of completion. Patient executed x50 in today's session, with cueing required to avoid taking large inhalation prior to swallow with prolonged breath hold. This improved over the course of the session. Patient encouraged to complete  per set, 2-3 sets per day as tolerated, taking sips of water as needed.     Patient will complete Mendelsohn  maneuver swallow strengthening exercise to increase hyolaryngeal elevation and excursion and therefore improve airway protection with minimal verbal cueing 20-30x per session.       Progressing/ Not Met 2/28/2024   Not formally addressed     Patient will complete CTAR/Shaker swallow strengthening exercise to improve pharyngeal constriction and therefore UES opening with minimal verbal cueing for 3, 1-minute holds and 30-50 repetitions.       Progressing/ Not Met 2/28/2024    Patient and his wife were educated regarding rationale for exercise with correct form/frequency of completion. Patient executed x3 1 minute holds and x30 repetitions in today's session, with cueing required to avoid thoracic movement, isolating movement of the jaw. This improved over the course of the session. Patient encouraged to complete x3 1 minute holds, 30-45 repetitions per set, 2-3 sets per day.     Patient will complete Edith exercise to improve pharyngeal stripping wave/constriction with minimal verbal cueing 20-30 times per session.       Progressing/ Not Met 2/28/2024  Patient and his wife were educated regarding rationale for exercise with correct form/frequency of completion. Patient executed x10 in today's session, with cueing required to continue holding tongue during swallow and taking sips of water as needed OUTSIDE of exercise completion. This improved over the course of the session. Patient encouraged to complete 20-30 per set, 2-3 sets per day as tolerated, taking sips of water as needed.     Patient will demonstrate the ability to adequately self-monitor swallowing skills and perform appropriate compensatory techniques with 90% accuracy to improve swallow safety and/or efficiency.       Progressing/ Not Met 2/28/2024   Not formally addressed     Patient and/or family will be educated on and implement adequate oral hygiene independently 2-3 times per day.       Progressing/ Not Met 2/28/2024    Patient's wife educated  regarding importance of oral hygiene as it relates to aspiration-related pneumonia. Patient encouraged to brush his teeth 2-3 times daily. They expressed understanding of information provided.      Patient and/or family will participate in dysphagia education including anatomy and physiology of swallow mechanism, clinical signs of aspiration, etc. with returned demonstration of information.       Goal Met 2/28/2024  Patient and his wife educated regarding MBSS findings and recommendations including anatomy/physiology of normal vs impaired swallow, rationale for each of exercises recommended, and importance of oral hygiene and use of strategies. They expressed understanding of all information provided.         Patient Education/Response:   Patient educated regarding the following:  Patient and his wife educated regarding MBSS findings and recommendations including anatomy/physiology of normal vs impaired swallow, rationale for each of exercises recommended, and importance of oral hygiene and use of strategies. They expressed understanding of all information provided.    Home program established: Patient instructed to continue prior program  Patient verbalized understanding to all above education provided.     See Electronic Medical Record under Patient Instructions for exercises provided throughout therapy.  Assessment:   Patient executed all dysphagia exercises as recommended in today's session, with discussion with patient's wife regarding correct form and frequency of completion to facilitate carryover of HEP. They expressed understanding of all information provided. Will plan to progress toward increased number of repetitions completed for each exercise in next session.    Pravin is progressing well towards his goals. Current goals remain appropriate. Goals to be updated as necessary.     Patient prognosis is Good. Patient will continue to benefit from skilled outpatient speech and language therapy to address the  deficits listed in the problem list on initial evaluation, provide patient/family education and to maximize patient's level of independence in the home and community environment.     Medical necessity is demonstrated by the following IMPAIRMENTS:  Dysphagia: Decreased safety and efficiency of swallowing function on PO intake without overt s/s of aspiration for the highest diet level.     Barriers to Therapy: NA  Patient's spiritual, cultural and educational needs considered and patient agreeable to plan of care and goals.  Plan:   Continue Plan of Care with focus on rehabilitation and compensation for dysphagia, impacting safety and efficiency of oral intake to meet nutrition/hydration needs.     Suad Velarde MA, CCC-SLP  Speech Language Pathologist  2/28/2024

## 2024-03-06 ENCOUNTER — CLINICAL SUPPORT (OUTPATIENT)
Dept: REHABILITATION | Facility: HOSPITAL | Age: 84
End: 2024-03-06
Payer: MEDICARE

## 2024-03-06 DIAGNOSIS — R13.13 PHARYNGEAL DYSPHAGIA: Primary | ICD-10-CM

## 2024-03-06 PROCEDURE — 92526 ORAL FUNCTION THERAPY: CPT | Performed by: SPEECH-LANGUAGE PATHOLOGIST

## 2024-03-06 NOTE — PROGRESS NOTES
[facility]  Speech Therapy Treatment Note- Dysphagia  Date: 3/6/2024     Name: Pravin Ferrari   MRN: [id number]   Therapy Diagnosis:   Encounter Diagnosis   Name Primary?    Pharyngeal dysphagia Yes   Physician: Penny Del Valle NP  Physician Orders: Ambulatory Referral to Speech Therapy   Medical Diagnosis: Dysphagia, unspecified type [R13.10]     Visit #/ Visits Authorized: 2 / 25 (+eval)  Date of Evaluation:  2/21/24  Insurance Authorization Period:  2/21/2024 - 12/31/2024   Plan of Care Expiration Date:    5/1/24  Extended Plan of Care:  -   Progress Note: 3/21/24     Time In:  1:05 PM  Time Out: 1:46 PM  Total Billable Time: 41 mins      Precautions: Standard, Fall, Cognition, and Dysphagia  Subjective:   Patient reports: he did his exercises at least 2x per day this week. He did not have any coughing during meals this week but he has some delayed coughing after meals.   He was compliant to home exercise program.     Response to previous treatment: NA    Pain Scale: no pain indicated throughout session  Objective:   TIMED  Procedure Min.   Cognitive Therapeutic Interventions, first 15 minutes CPT 85814  0   Cognitive Therapeutic Interventions, each additional 15 minutes CPT 83659  0         UNTIMED  Procedure   Dysphagia Therapy     Short Term Goals: (5 weeks) Current Progress:   Patient will independently demonstrate adequate use of effortful swallow technique to improve tongue base retraction and therefore overall swallow strength, safety and efficiency 60-75x per session.      Progressing/ Not Met 3/6/2024  Patient and his wife were educated regarding rationale for exercise with correct form/frequency of completion. Patient executed x55 in today's session, with cueing required to avoid taking large inhalation prior to swallow with prolonged breath hold. This improved over the course of the session. Patient encouraged to complete  per set, 2-3 sets per day as tolerated, taking sips of  water as needed.     Patient will complete Mendelsohn maneuver swallow strengthening exercise to increase hyolaryngeal elevation and excursion and therefore improve airway protection with minimal verbal cueing 20-30x per session.       Progressing/ Not Met 3/6/2024   Not formally addressed     Patient will complete CTAR/Shaker swallow strengthening exercise to improve pharyngeal constriction and therefore UES opening with minimal verbal cueing for 3, 1-minute holds and 30-50 repetitions.       Progressing/ Not Met 3/6/2024    Patient and his wife were educated regarding rationale for exercise with correct form/frequency of completion. Patient executed x3 1 minute holds and x40 repetitions in today's session, with cueing required to avoid thoracic movement, isolating movement of the jaw. This improved over the course of the session, as patient was able to count repetitions himself as session progressed. Patient encouraged to complete x3 1 minute holds, 30-45 repetitions per set, 2-3 sets per day.     Patient will complete Edith exercise to improve pharyngeal stripping wave/constriction with minimal verbal cueing 20-30 times per session.       Progressing/ Not Met 3/6/2024  Patient and his wife were educated regarding rationale for exercise with correct form/frequency of completion. Patient executed x10 in today's session, with cueing required to continue holding tongue during swallow and taking sips of water as needed OUTSIDE of exercise completion. This improved over the course of the session. Patient encouraged to complete 20-30 per set, 2-3 sets per day as tolerated, taking sips of water as needed.     Patient will demonstrate the ability to adequately self-monitor swallowing skills and perform appropriate compensatory techniques with 90% accuracy to improve swallow safety and/or efficiency.       Progressing/ Not Met 3/6/2024   Not formally addressed     Patient and/or family will be educated on and implement  adequate oral hygiene independently 2-3 times per day.       Progressing/ Not Met 3/6/2024    Patient's wife educated regarding importance of oral hygiene as it relates to aspiration-related pneumonia. Patient encouraged to brush his teeth 2-3 times daily. They expressed understanding of information provided.      Patient and/or family will participate in dysphagia education including anatomy and physiology of swallow mechanism, clinical signs of aspiration, etc. with returned demonstration of information.       Goal Met 2/28/2024  Patient and his wife educated regarding MBSS findings and recommendations including anatomy/physiology of normal vs impaired swallow, rationale for each of exercises recommended, and importance of oral hygiene and use of strategies. They expressed understanding of all information provided.         Patient Education/Response:   Patient educated regarding the following:  Patient and his wife educated regarding MBSS findings and recommendations including anatomy/physiology of normal vs impaired swallow, rationale for each of exercises recommended, and importance of oral hygiene and use of strategies. They expressed understanding of all information provided.    Home program established: Patient instructed to continue prior program  Patient verbalized understanding to all above education provided.     See Electronic Medical Record under Patient Instructions for exercises provided throughout therapy.  Assessment:   Patient executed all dysphagia exercises as recommended in today's session with increased number of repetitions for all exercises compared to previous session, with discussion with patient's wife regarding correct form and frequency of completion to facilitate carryover of HEP. They expressed understanding of all information provided. Will plan to progress toward increased number of repetitions completed for each exercise in next session.    Pravin is progressing well towards his  goals. Current goals remain appropriate. Goals to be updated as necessary.     Patient prognosis is Good. Patient will continue to benefit from skilled outpatient speech and language therapy to address the deficits listed in the problem list on initial evaluation, provide patient/family education and to maximize patient's level of independence in the home and community environment.     Medical necessity is demonstrated by the following IMPAIRMENTS:  Dysphagia: Decreased safety and efficiency of swallowing function on PO intake without overt s/s of aspiration for the highest diet level.     Barriers to Therapy: NA  Patient's spiritual, cultural and educational needs considered and patient agreeable to plan of care and goals.  Plan:   Continue Plan of Care with focus on rehabilitation and compensation for dysphagia, impacting safety and efficiency of oral intake to meet nutrition/hydration needs.     Suad Velarde MA, CCC-SLP  Speech Language Pathologist  3/6/2024

## 2024-03-13 ENCOUNTER — CLINICAL SUPPORT (OUTPATIENT)
Dept: REHABILITATION | Facility: HOSPITAL | Age: 84
End: 2024-03-13
Payer: MEDICARE

## 2024-03-13 DIAGNOSIS — R13.13 PHARYNGEAL DYSPHAGIA: Primary | ICD-10-CM

## 2024-03-13 PROCEDURE — 92526 ORAL FUNCTION THERAPY: CPT | Performed by: SPEECH-LANGUAGE PATHOLOGIST

## 2024-03-13 NOTE — PROGRESS NOTES
[facility]  Speech Therapy Treatment Note- Dysphagia  Date: 3/13/2024     Name: Pravin Ferrari   MRN: [id number]   Therapy Diagnosis:   Encounter Diagnosis   Name Primary?    Pharyngeal dysphagia Yes   Physician: Penny Del Valle NP  Physician Orders: Ambulatory Referral to Speech Therapy   Medical Diagnosis: Dysphagia, unspecified type [R13.10]     Visit #/ Visits Authorized: 3 / 25 (+eval)  Date of Evaluation:  2/21/24  Insurance Authorization Period:  2/21/2024 - 12/31/2024   Plan of Care Expiration Date:    5/1/24  Extended Plan of Care:  -   Progress Note: 3/21/24     Time In:  1:15 PM  Time Out: 1:57 PM  Total Billable Time: 42 mins      Precautions: Standard, Fall, Cognition, and Dysphagia  Subjective:   Patient reports: he is overall coughing less. He still has some intermittent coughing spells after eating, but they still occur sometimes. His wife reports he continues to eat impulsively with quick pacing between bites and sips and large bites/sips as well which is difficult to manage during meals.   He was compliant to home exercise program.     Response to previous treatment: NA    Pain Scale: no pain indicated throughout session  Objective:   TIMED  Procedure Min.   Cognitive Therapeutic Interventions, first 15 minutes CPT 09573  0   Cognitive Therapeutic Interventions, each additional 15 minutes CPT 56824  0         UNTIMED  Procedure   Dysphagia Therapy     Short Term Goals: (5 weeks) Current Progress:   Patient will independently demonstrate adequate use of effortful swallow technique to improve tongue base retraction and therefore overall swallow strength, safety and efficiency 60-75x per session.      Progressing/ Not Met 3/13/2024  Patient and his wife were educated regarding rationale for exercise with correct form/frequency of completion. Patient executed x65 in today's session, with cueing required to avoid taking large inhalation prior to swallow with prolonged breath hold. This  improved over the course of the session. Patient encouraged to complete  per set, 2-3 sets per day as tolerated, taking sips of water as needed.     Patient will complete Mendelsohn maneuver swallow strengthening exercise to increase hyolaryngeal elevation and excursion and therefore improve airway protection with minimal verbal cueing 20-30x per session.       Progressing/ Not Met 3/13/2024   Not formally addressed     Patient will complete CTAR/Shaker swallow strengthening exercise to improve pharyngeal constriction and therefore UES opening with minimal verbal cueing for 3, 1-minute holds and 30-50 repetitions.       Progressing/ Not Met 3/13/2024    Patient and his wife were educated regarding rationale for exercise with correct form/frequency of completion. Patient executed x3 1 minute holds and x30 repetitions in today's session, with cueing required to avoid thoracic movement, isolating movement of the jaw. This improved over the course of the session, as patient was able to count repetitions himself as session progressed. Patient encouraged to complete x3 1 minute holds, 30-45 repetitions per set, 2-3 sets per day.     Patient will complete Edith exercise to improve pharyngeal stripping wave/constriction with minimal verbal cueing 20-30 times per session.       Progressing/ Not Met 3/13/2024  Patient and his wife were educated regarding rationale for exercise with correct form/frequency of completion. Patient executed x12 in today's session, with cueing required to continue holding tongue during swallow and taking sips of water as needed OUTSIDE of exercise completion. This improved over the course of the session. Patient encouraged to complete 20-30 per set, 2-3 sets per day as tolerated, taking sips of water as needed.     Patient will demonstrate the ability to adequately self-monitor swallowing skills and perform appropriate compensatory techniques with 90% accuracy to improve swallow safety  and/or efficiency.       Progressing/ Not Met 3/13/2024   Not formally addressed     Patient and/or family will be educated on and implement adequate oral hygiene independently 2-3 times per day.       Progressing/ Not Met 3/13/2024    Patient's wife educated regarding importance of oral hygiene as it relates to aspiration-related pneumonia. Patient encouraged to brush his teeth 2-3 times daily. They expressed understanding of information provided.      Patient and/or family will participate in dysphagia education including anatomy and physiology of swallow mechanism, clinical signs of aspiration, etc. with returned demonstration of information.       Goal Met 2/28/2024  Patient and his wife educated regarding MBSS findings and recommendations including anatomy/physiology of normal vs impaired swallow, rationale for each of exercises recommended, and importance of oral hygiene and use of strategies. They expressed understanding of all information provided.         Patient Education/Response:   Patient educated regarding the following:  Patient and his wife educated regarding MBSS findings and recommendations including anatomy/physiology of normal vs impaired swallow, rationale for each of exercises recommended, and importance of oral hygiene and use of strategies. They expressed understanding of all information provided.    Home program established: Patient instructed to continue prior program  Patient verbalized understanding to all above education provided.     See Electronic Medical Record under Patient Instructions for exercises provided throughout therapy.  Assessment:   Patient executed all dysphagia exercises as recommended in today's session with increased number of repetitions for all exercises compared to previous session, specifically effortful swallows, with discussion with patient's wife regarding correct form and frequency of completion to facilitate carryover of HEP. They expressed understanding of  all information provided. Will plan to progress per patient tolerance in next session.    Pravin is progressing well towards his goals. Current goals remain appropriate. Goals to be updated as necessary.     Patient prognosis is Good. Patient will continue to benefit from skilled outpatient speech and language therapy to address the deficits listed in the problem list on initial evaluation, provide patient/family education and to maximize patient's level of independence in the home and community environment.     Medical necessity is demonstrated by the following IMPAIRMENTS:  Dysphagia: Decreased safety and efficiency of swallowing function on PO intake without overt s/s of aspiration for the highest diet level.     Barriers to Therapy: NA  Patient's spiritual, cultural and educational needs considered and patient agreeable to plan of care and goals.  Plan:   Continue Plan of Care with focus on rehabilitation and compensation for dysphagia, impacting safety and efficiency of oral intake to meet nutrition/hydration needs.     Suad Velarde MA, CCC-SLP  Speech Language Pathologist  3/13/2024

## 2024-03-20 ENCOUNTER — CLINICAL SUPPORT (OUTPATIENT)
Dept: REHABILITATION | Facility: HOSPITAL | Age: 84
End: 2024-03-20
Payer: MEDICARE

## 2024-03-20 DIAGNOSIS — R13.13 PHARYNGEAL DYSPHAGIA: Primary | ICD-10-CM

## 2024-03-20 PROCEDURE — 92526 ORAL FUNCTION THERAPY: CPT | Performed by: SPEECH-LANGUAGE PATHOLOGIST

## 2024-03-20 NOTE — PROGRESS NOTES
[facility]  Speech Therapy Treatment Note- Dysphagia  Date: 3/20/2024     Name: Pravin Ferrari   MRN: [id number]   Therapy Diagnosis:   Encounter Diagnosis   Name Primary?    Pharyngeal dysphagia Yes   Physician: Penny Del Valle NP  Physician Orders: Ambulatory Referral to Speech Therapy   Medical Diagnosis: Dysphagia, unspecified type [R13.10]     Visit #/ Visits Authorized: 4 / 25 (+eval)  Date of Evaluation:  2/21/24  Insurance Authorization Period:  2/21/2024 - 12/31/2024   Plan of Care Expiration Date:    5/1/24  Extended Plan of Care:  -   Progress Note: 3/21/24     Time In:  1:15 PM  Time Out: 1:57 PM  Total Billable Time: 42 mins      Precautions: Standard, Fall, Cognition, and Dysphagia  Subjective:   Patient reports: he is doing well. His wife reports overall little-no coughing during meals; if he does cough, it is often delayed after meals.   He was compliant to home exercise program.     Response to previous treatment: NA    Pain Scale: no pain indicated throughout session  Objective:   TIMED  Procedure Min.   Cognitive Therapeutic Interventions, first 15 minutes CPT 81161  0   Cognitive Therapeutic Interventions, each additional 15 minutes CPT 85183  0         UNTIMED  Procedure   Dysphagia Therapy     Short Term Goals: (5 weeks) Current Progress:   Patient will independently demonstrate adequate use of effortful swallow technique to improve tongue base retraction and therefore overall swallow strength, safety and efficiency 60-75x per session.      Progressing/ Not Met 3/20/2024  Patient and his wife were educated regarding rationale for exercise with correct form/frequency of completion. Patient executed x70 in today's session, with cueing required to avoid taking large inhalation prior to swallow with prolonged breath hold. This improved over the course of the session. Patient encouraged to complete  per set, 2-3 sets per day as tolerated, taking sips of water as needed.      Patient will complete Mendelsohn maneuver swallow strengthening exercise to increase hyolaryngeal elevation and excursion and therefore improve airway protection with minimal verbal cueing 20-30x per session.       Progressing/ Not Met 3/20/2024   Not formally addressed     Patient will complete CTAR/Shaker swallow strengthening exercise to improve pharyngeal constriction and therefore UES opening with minimal verbal cueing for 3, 1-minute holds and 30-50 repetitions.       Progressing/ Not Met 3/20/2024    Patient and his wife were educated regarding rationale for exercise with correct form/frequency of completion. Patient executed x3 1 minute holds and x45 repetitions in today's session, with cueing required to avoid thoracic movement, isolating movement of the jaw. This improved over the course of the session, as patient was able to count repetitions himself as session progressed. Patient encouraged to complete x3 1 minute holds, 30-45 repetitions per set, 2-3 sets per day.     Patient will complete Edith exercise to improve pharyngeal stripping wave/constriction with minimal verbal cueing 20-30 times per session.       Progressing/ Not Met 3/20/2024  Patient and his wife were educated regarding rationale for exercise with correct form/frequency of completion. Patient executed x10 in today's session, with cueing required to continue holding tongue during swallow and taking sips of water as needed OUTSIDE of exercise completion. This improved over the course of the session. Patient encouraged to complete 20-30 per set, 2-3 sets per day as tolerated, taking sips of water as needed.     Patient will demonstrate the ability to adequately self-monitor swallowing skills and perform appropriate compensatory techniques with 90% accuracy to improve swallow safety and/or efficiency.       Progressing/ Not Met 3/20/2024   Not formally addressed     Patient and/or family will be educated on and implement adequate oral  hygiene independently 2-3 times per day.       Progressing/ Not Met 3/20/2024    Patient's wife educated regarding importance of oral hygiene as it relates to aspiration-related pneumonia. Patient encouraged to brush his teeth 2-3 times daily. They expressed understanding of information provided.      Patient and/or family will participate in dysphagia education including anatomy and physiology of swallow mechanism, clinical signs of aspiration, etc. with returned demonstration of information.       Goal Met 2/28/2024  Patient and his wife educated regarding MBSS findings and recommendations including anatomy/physiology of normal vs impaired swallow, rationale for each of exercises recommended, and importance of oral hygiene and use of strategies. They expressed understanding of all information provided.         Patient Education/Response:   Patient educated regarding the following:  Patient and his wife educated regarding MBSS findings and recommendations including anatomy/physiology of normal vs impaired swallow, rationale for each of exercises recommended, and importance of oral hygiene and use of strategies. They expressed understanding of all information provided.    Home program established: Patient instructed to continue prior program  Patient verbalized understanding to all above education provided.     See Electronic Medical Record under Patient Instructions for exercises provided throughout therapy.  Assessment:   Patient executed all dysphagia exercises as recommended in today's session with increased number of repetitions for all exercises compared to previous session, specifically effortful swallows and CTAR, with discussion with patient's wife regarding correct form and frequency of completion to facilitate carryover of HEP. Discussed specifically avoiding breath holding for execution of effortful swallows, which patient was able to execute correctly given consistent verbal cueing. They expressed  understanding of all information provided. Will plan to progress per patient tolerance in next session.    Pravin is progressing well towards his goals. Current goals remain appropriate. Goals to be updated as necessary.     Patient prognosis is Good. Patient will continue to benefit from skilled outpatient speech and language therapy to address the deficits listed in the problem list on initial evaluation, provide patient/family education and to maximize patient's level of independence in the home and community environment.     Medical necessity is demonstrated by the following IMPAIRMENTS:  Dysphagia: Decreased safety and efficiency of swallowing function on PO intake without overt s/s of aspiration for the highest diet level.     Barriers to Therapy: NA  Patient's spiritual, cultural and educational needs considered and patient agreeable to plan of care and goals.  Plan:   Continue Plan of Care with focus on rehabilitation and compensation for dysphagia, impacting safety and efficiency of oral intake to meet nutrition/hydration needs.     Suad Velarde MA, CCC-SLP  Speech Language Pathologist  3/20/2024

## 2024-03-27 ENCOUNTER — CLINICAL SUPPORT (OUTPATIENT)
Dept: REHABILITATION | Facility: HOSPITAL | Age: 84
End: 2024-03-27
Payer: MEDICARE

## 2024-03-27 DIAGNOSIS — R13.13 PHARYNGEAL DYSPHAGIA: Primary | ICD-10-CM

## 2024-03-27 PROCEDURE — 92526 ORAL FUNCTION THERAPY: CPT | Performed by: SPEECH-LANGUAGE PATHOLOGIST

## 2024-03-27 NOTE — PROGRESS NOTES
[facility]  Speech Therapy Treatment Note- Dysphagia  Date: 3/27/2024     Name: Pravin Ferrari   MRN: [id number]   Therapy Diagnosis:   Encounter Diagnosis   Name Primary?    Pharyngeal dysphagia Yes   Physician: Penny Del Valle NP  Physician Orders: Ambulatory Referral to Speech Therapy   Medical Diagnosis: Dysphagia, unspecified type [R13.10]     Visit #/ Visits Authorized: 5 / 25 (+eval)  Date of Evaluation:  2/21/24  Insurance Authorization Period:  2/21/2024 - 12/31/2024   Plan of Care Expiration Date:    5/1/24  Extended Plan of Care:  -   Progress Note: 3/21/24     Time In:  1:05 PM  Time Out: 1:49 PM  Total Billable Time: 44 mins      Precautions: Standard, Fall, Cognition, and Dysphagia  Subjective:   Patient reports: he is doing well. His wife reports swallowing is overall going really well; his coughing is overall much less and when he does cough, it is less duration and overall amount.   He was compliant to home exercise program.     Response to previous treatment: NA    Pain Scale: no pain indicated throughout session  Objective:   TIMED  Procedure Min.   Cognitive Therapeutic Interventions, first 15 minutes CPT 44500  0   Cognitive Therapeutic Interventions, each additional 15 minutes CPT 06658  0         UNTIMED  Procedure   Dysphagia Therapy     Short Term Goals: (5 weeks) Current Progress:   Patient will independently demonstrate adequate use of effortful swallow technique to improve tongue base retraction and therefore overall swallow strength, safety and efficiency 60-75x per session.      Progressing/ Not Met 3/27/2024  Patient and his wife were educated regarding rationale for exercise with correct form/frequency of completion. Patient executed x80 in today's session, with cueing required to avoid taking large inhalation prior to swallow with prolonged breath hold. This improved over the course of the session. Patient encouraged to complete  per set, 2-3 sets per day as  tolerated, taking sips of water as needed.     Patient will complete Mendelsohn maneuver swallow strengthening exercise to increase hyolaryngeal elevation and excursion and therefore improve airway protection with minimal verbal cueing 20-30x per session.       Progressing/ Not Met 3/27/2024   Not formally addressed     Patient will complete CTAR/Shaker swallow strengthening exercise to improve pharyngeal constriction and therefore UES opening with minimal verbal cueing for 3, 1-minute holds and 30-50 repetitions.       Progressing/ Not Met 3/27/2024    Patient and his wife were educated regarding rationale for exercise with correct form/frequency of completion. Patient executed x3 1 minute holds and x50 repetitions in today's session, with cueing required to avoid thoracic movement, isolating movement of the jaw. This improved over the course of the session, as patient was able to count repetitions himself as session progressed. Patient encouraged to complete x3 1 minute holds, 30-45 repetitions per set, 2-3 sets per day.     Patient will complete Edith exercise to improve pharyngeal stripping wave/constriction with minimal verbal cueing 20-30 times per session.       Progressing/ Not Met 3/27/2024  Patient and his wife were educated regarding rationale for exercise with correct form/frequency of completion. Patient executed x10 in today's session, with cueing required to continue holding tongue during swallow and taking sips of water as needed OUTSIDE of exercise completion. This improved over the course of the session. Patient encouraged to complete 20-30 per set, 2-3 sets per day as tolerated, taking sips of water as needed.     Patient will demonstrate the ability to adequately self-monitor swallowing skills and perform appropriate compensatory techniques with 90% accuracy to improve swallow safety and/or efficiency.       Progressing/ Not Met 3/27/2024   Not formally addressed     Patient and/or family will  be educated on and implement adequate oral hygiene independently 2-3 times per day.       Progressing/ Not Met 3/27/2024    Patient's wife educated regarding importance of oral hygiene as it relates to aspiration-related pneumonia. Patient encouraged to brush his teeth 2-3 times daily. They expressed understanding of information provided.      Patient and/or family will participate in dysphagia education including anatomy and physiology of swallow mechanism, clinical signs of aspiration, etc. with returned demonstration of information.       Goal Met 2/28/2024  Patient and his wife educated regarding MBSS findings and recommendations including anatomy/physiology of normal vs impaired swallow, rationale for each of exercises recommended, and importance of oral hygiene and use of strategies. They expressed understanding of all information provided.         Patient Education/Response:   Patient educated regarding the following:  Patient and his wife educated regarding MBSS findings and recommendations including anatomy/physiology of normal vs impaired swallow, rationale for each of exercises recommended, and importance of oral hygiene and use of strategies. They expressed understanding of all information provided.    Home program established: Patient instructed to continue prior program  Patient verbalized understanding to all above education provided.     See Electronic Medical Record under Patient Instructions for exercises provided throughout therapy.  Assessment:   Patient executed all dysphagia exercises as recommended in today's session with increased number of repetitions for all exercises compared to previous session, specifically effortful swallows and CTAR, with discussion with patient's wife regarding correct form and frequency of completion to facilitate carryover of HEP. Discussed specifically avoiding breath holding for execution of effortful swallows, which patient was able to execute correctly given  consistent verbal cueing. They expressed understanding of all information provided. Will plan to progress per patient tolerance in next session.    Pravin is progressing well towards his goals. Current goals remain appropriate. Goals to be updated as necessary.     Patient prognosis is Good. Patient will continue to benefit from skilled outpatient speech and language therapy to address the deficits listed in the problem list on initial evaluation, provide patient/family education and to maximize patient's level of independence in the home and community environment.     Medical necessity is demonstrated by the following IMPAIRMENTS:  Dysphagia: Decreased safety and efficiency of swallowing function on PO intake without overt s/s of aspiration for the highest diet level.     Barriers to Therapy: NA  Patient's spiritual, cultural and educational needs considered and patient agreeable to plan of care and goals.  Plan:   Continue Plan of Care with focus on rehabilitation and compensation for dysphagia, impacting safety and efficiency of oral intake to meet nutrition/hydration needs.     Suad Velarde MA, CCC-SLP  Speech Language Pathologist  3/27/2024

## 2024-04-03 ENCOUNTER — CLINICAL SUPPORT (OUTPATIENT)
Dept: REHABILITATION | Facility: HOSPITAL | Age: 84
End: 2024-04-03
Payer: MEDICARE

## 2024-04-03 DIAGNOSIS — R13.13 PHARYNGEAL DYSPHAGIA: Primary | ICD-10-CM

## 2024-04-03 PROCEDURE — 92526 ORAL FUNCTION THERAPY: CPT | Performed by: SPEECH-LANGUAGE PATHOLOGIST

## 2024-04-03 NOTE — PROGRESS NOTES
[facility]  Speech Therapy Treatment Note- Dysphagia  Date: 4/3/2024     Name: Pravin Ferrari   MRN: [id number]   Therapy Diagnosis:   Encounter Diagnosis   Name Primary?    Pharyngeal dysphagia Yes   Physician: Penny Del Valle NP  Physician Orders: Ambulatory Referral to Speech Therapy   Medical Diagnosis: Dysphagia, unspecified type [R13.10]     Visit #/ Visits Authorized: 6 / 25 (+eval)  Date of Evaluation:  2/21/24  Insurance Authorization Period:  2/21/2024 - 12/31/2024   Plan of Care Expiration Date:    5/1/24  Extended Plan of Care:  -   Progress Note: 3/21/24     Time In:  1:15 PM  Time Out: 1:57 PM  Total Billable Time: 42 mins      Precautions: Standard, Fall, Cognition, and Dysphagia  Subjective:   Patient reports: he is doing well. His wife reports they had a good Easter. She reports little to no coughing during eating, but he did have one instance of coughing with liquids.   He was compliant to home exercise program.     Response to previous treatment: NA    Pain Scale: no pain indicated throughout session  Objective:   TIMED  Procedure Min.   Cognitive Therapeutic Interventions, first 15 minutes CPT 72864  0   Cognitive Therapeutic Interventions, each additional 15 minutes CPT 49327  0         UNTIMED  Procedure   Dysphagia Therapy     Short Term Goals: (5 weeks) Current Progress:   Patient will independently demonstrate adequate use of effortful swallow technique to improve tongue base retraction and therefore overall swallow strength, safety and efficiency 60-75x per session.      Progressing/ Not Met 4/3/2024  Patient and his wife were educated regarding rationale for exercise with correct form/frequency of completion. Patient executed x90 in today's session, with cueing required to avoid taking large inhalation prior to swallow with prolonged breath hold. This improved over the course of the session. Patient encouraged to complete  per set, 2-3 sets per day as tolerated,  taking sips of water as needed.     Patient will complete Mendelsohn maneuver swallow strengthening exercise to increase hyolaryngeal elevation and excursion and therefore improve airway protection with minimal verbal cueing 20-30x per session.       Progressing/ Not Met 4/3/2024   Not formally addressed     Patient will complete CTAR/Shaker swallow strengthening exercise to improve pharyngeal constriction and therefore UES opening with minimal verbal cueing for 3, 1-minute holds and 30-50 repetitions.       Progressing/ Not Met 4/3/2024    Patient and his wife were educated regarding rationale for exercise with correct form/frequency of completion. Patient executed x3 1 minute holds and x50 repetitions in today's session, with cueing required to avoid thoracic movement, isolating movement of the jaw. This improved over the course of the session, as patient was able to count repetitions himself as session progressed. Patient encouraged to complete x3 1 minute holds, 30-45 repetitions per set, 2-3 sets per day.     Patient will complete Edith exercise to improve pharyngeal stripping wave/constriction with minimal verbal cueing 20-30 times per session.       Progressing/ Not Met 4/3/2024  Patient and his wife were educated regarding rationale for exercise with correct form/frequency of completion. Patient executed x10 in today's session, with cueing required to continue holding tongue during swallow and taking sips of water as needed OUTSIDE of exercise completion. This improved over the course of the session. Patient encouraged to complete 20-30 per set, 2-3 sets per day as tolerated, taking sips of water as needed.     Patient will demonstrate the ability to adequately self-monitor swallowing skills and perform appropriate compensatory techniques with 90% accuracy to improve swallow safety and/or efficiency.       Progressing/ Not Met 4/3/2024   Not formally addressed     Patient and/or family will be educated on  and implement adequate oral hygiene independently 2-3 times per day.       Progressing/ Not Met 4/3/2024    Patient's wife educated regarding importance of oral hygiene as it relates to aspiration-related pneumonia. Patient encouraged to brush his teeth 2-3 times daily. They expressed understanding of information provided.      Patient and/or family will participate in dysphagia education including anatomy and physiology of swallow mechanism, clinical signs of aspiration, etc. with returned demonstration of information.       Goal Met 2/28/2024  Patient and his wife educated regarding MBSS findings and recommendations including anatomy/physiology of normal vs impaired swallow, rationale for each of exercises recommended, and importance of oral hygiene and use of strategies. They expressed understanding of all information provided.         Patient Education/Response:   Patient educated regarding the following:  Patient and his wife educated regarding MBSS findings and recommendations including anatomy/physiology of normal vs impaired swallow, rationale for each of exercises recommended, and importance of oral hygiene and use of strategies. They expressed understanding of all information provided.    Home program established: Patient instructed to continue prior program  Patient verbalized understanding to all above education provided.     See Electronic Medical Record under Patient Instructions for exercises provided throughout therapy.  Assessment:   Patient executed all dysphagia exercises as recommended in today's session with increased number of repetitions for all exercises compared to previous session, specifically effortful swallows. Discussed specifically avoiding breath holding for execution of effortful swallows, which patient was able to execute correctly given consistent verbal cueing. They expressed understanding of all information provided. Will plan to progress per patient tolerance in next  session.    Pravin is progressing well towards his goals. Current goals remain appropriate. Goals to be updated as necessary.     Patient prognosis is Good. Patient will continue to benefit from skilled outpatient speech and language therapy to address the deficits listed in the problem list on initial evaluation, provide patient/family education and to maximize patient's level of independence in the home and community environment.     Medical necessity is demonstrated by the following IMPAIRMENTS:  Dysphagia: Decreased safety and efficiency of swallowing function on PO intake without overt s/s of aspiration for the highest diet level.     Barriers to Therapy: NA  Patient's spiritual, cultural and educational needs considered and patient agreeable to plan of care and goals.  Plan:   Continue Plan of Care with focus on rehabilitation and compensation for dysphagia, impacting safety and efficiency of oral intake to meet nutrition/hydration needs.     Suad Velarde MA, CCC-SLP  Speech Language Pathologist  4/3/2024

## 2024-04-11 DIAGNOSIS — Z86.73 HISTORY OF CVA (CEREBROVASCULAR ACCIDENT) WITHOUT RESIDUAL DEFICITS: ICD-10-CM

## 2024-04-11 DIAGNOSIS — I67.2 CEREBRAL ATHEROSCLEROSIS: ICD-10-CM

## 2024-04-11 DIAGNOSIS — I70.0 AORTIC ATHEROSCLEROSIS: ICD-10-CM

## 2024-04-11 RX ORDER — CLOPIDOGREL BISULFATE 75 MG/1
75 TABLET ORAL
Qty: 90 TABLET | Refills: 1 | Status: SHIPPED | OUTPATIENT
Start: 2024-04-11

## 2024-04-11 NOTE — TELEPHONE ENCOUNTER
Refill Decision Note   Pravin Ferrari  is requesting a refill authorization.  Brief Assessment and Rationale for Refill:  Approve     Medication Therapy Plan:         Comments:     Note composed:10:17 AM 04/11/2024

## 2024-04-11 NOTE — TELEPHONE ENCOUNTER
No care due was identified.  [facility] Embedded Care Due Messages. Reference number: [id number].   4/11/2024 4:15:16 AM CDT

## 2024-04-17 ENCOUNTER — CLINICAL SUPPORT (OUTPATIENT)
Dept: REHABILITATION | Facility: HOSPITAL | Age: 84
End: 2024-04-17
Payer: MEDICARE

## 2024-04-17 DIAGNOSIS — R13.13 PHARYNGEAL DYSPHAGIA: Primary | ICD-10-CM

## 2024-04-17 PROCEDURE — 92526 ORAL FUNCTION THERAPY: CPT | Performed by: SPEECH-LANGUAGE PATHOLOGIST

## 2024-04-17 NOTE — PROGRESS NOTES
[facility]  Speech Therapy Treatment Note- Dysphagia  Date: 4/17/2024     Name: Pravin Ferrari   MRN: [id number]   Therapy Diagnosis:   Encounter Diagnosis   Name Primary?    Pharyngeal dysphagia Yes   Physician: Penny Del Valle NP  Physician Orders: Ambulatory Referral to Speech Therapy   Medical Diagnosis: Dysphagia, unspecified type [R13.10]     Visit #/ Visits Authorized: 7 / 25 (+eval)  Date of Evaluation:  2/21/24  Insurance Authorization Period:  2/21/2024 - 12/31/2024   Plan of Care Expiration Date:    5/1/24  Extended Plan of Care:  -   Progress Note: 3/21/24     Time In:  1:15 PM  Time Out: 2:00 PM  Total Billable Time: 45 mins      Precautions: Standard, Fall, Cognition, and Dysphagia  Subjective:   Patient reports: he is doing well. His wife reports he has had more coughing in the past few days.   He was compliant to home exercise program.     Response to previous treatment: NA    Pain Scale: no pain indicated throughout session  Objective:   TIMED  Procedure Min.   Cognitive Therapeutic Interventions, first 15 minutes CPT 26351  0   Cognitive Therapeutic Interventions, each additional 15 minutes CPT 43885  0         UNTIMED  Procedure   Dysphagia Therapy     Short Term Goals: (5 weeks) Current Progress:   Patient will independently demonstrate adequate use of effortful swallow technique to improve tongue base retraction and therefore overall swallow strength, safety and efficiency 60-75x per session.      Progressing/ Not Met 4/17/2024  Patient and his wife were educated regarding rationale for exercise with correct form/frequency of completion. Patient executed x70 in today's session, with cueing required to avoid taking large inhalation prior to swallow with prolonged breath hold. This improved over the course of the session. Patient encouraged to complete  per set, 2-3 sets per day as tolerated, taking sips of water as needed.     Patient will complete Mendelsohn maneuver  swallow strengthening exercise to increase hyolaryngeal elevation and excursion and therefore improve airway protection with minimal verbal cueing 20-30x per session.       Progressing/ Not Met 4/17/2024   Not formally addressed     Patient will complete CTAR/Shaker swallow strengthening exercise to improve pharyngeal constriction and therefore UES opening with minimal verbal cueing for 3, 1-minute holds and 30-50 repetitions.       Progressing/ Not Met 4/17/2024    Patient and his wife were educated regarding rationale for exercise with correct form/frequency of completion. Patient executed x3 1 minute holds and x50 repetitions in today's session, with cueing required to avoid thoracic movement, isolating movement of the jaw. This improved over the course of the session, as patient was able to count repetitions himself as session progressed. Patient encouraged to complete x3 1 minute holds, 30-45 repetitions per set, 2-3 sets per day.     Patient will complete Edith exercise to improve pharyngeal stripping wave/constriction with minimal verbal cueing 20-30 times per session.       Progressing/ Not Met 4/17/2024  Patient and his wife were educated regarding rationale for exercise with correct form/frequency of completion. Patient executed x10 in today's session, with cueing required to continue holding tongue during swallow and taking sips of water as needed OUTSIDE of exercise completion. This improved over the course of the session. Patient encouraged to complete 20-30 per set, 2-3 sets per day as tolerated, taking sips of water as needed.     Patient will demonstrate the ability to adequately self-monitor swallowing skills and perform appropriate compensatory techniques with 90% accuracy to improve swallow safety and/or efficiency.       Progressing/ Not Met 4/17/2024   Not formally addressed     Patient and/or family will be educated on and implement adequate oral hygiene independently 2-3 times per day.        Progressing/ Not Met 4/17/2024    Patient's wife educated regarding importance of oral hygiene as it relates to aspiration-related pneumonia. Patient encouraged to brush his teeth 2-3 times daily. They expressed understanding of information provided.      Patient and/or family will participate in dysphagia education including anatomy and physiology of swallow mechanism, clinical signs of aspiration, etc. with returned demonstration of information.       Goal Met 2/28/2024  Patient and his wife educated regarding MBSS findings and recommendations including anatomy/physiology of normal vs impaired swallow, rationale for each of exercises recommended, and importance of oral hygiene and use of strategies. They expressed understanding of all information provided.         Patient Education/Response:   Patient educated regarding the following:  Patient and his wife educated regarding MBSS findings and recommendations including anatomy/physiology of normal vs impaired swallow, rationale for each of exercises recommended, and importance of oral hygiene and use of strategies. They expressed understanding of all information provided.    Home program established: Patient instructed to continue prior program  Patient verbalized understanding to all above education provided.     See Electronic Medical Record under Patient Instructions for exercises provided throughout therapy.  Assessment:   Patient executed all dysphagia exercises as recommended in today's session some less repetitions compared to previous, specifically effortful swallows, potentially related to patient's wife reporting he has had some worse day with his dementia this week. Discussed specifically avoiding breath holding for execution of effortful swallows, which patient was able to execute correctly given consistent verbal cueing. Additionally, discussed impact of cognition and dementia, specifically distractions, on swallow functioning and potentially  increased overt signs of aspiration. Patient's wife expressed understanding and will monitor this week. Will plan to progress per patient tolerance in next session.    Pravin is progressing well towards his goals. Current goals remain appropriate. Goals to be updated as necessary.     Patient prognosis is Good. Patient will continue to benefit from skilled outpatient speech and language therapy to address the deficits listed in the problem list on initial evaluation, provide patient/family education and to maximize patient's level of independence in the home and community environment.     Medical necessity is demonstrated by the following IMPAIRMENTS:  Dysphagia: Decreased safety and efficiency of swallowing function on PO intake without overt s/s of aspiration for the highest diet level.     Barriers to Therapy: NA  Patient's spiritual, cultural and educational needs considered and patient agreeable to plan of care and goals.  Plan:   Continue Plan of Care with focus on rehabilitation and compensation for dysphagia, impacting safety and efficiency of oral intake to meet nutrition/hydration needs.     Suad Velarde MA, CCC-SLP  Speech Language Pathologist  4/17/2024

## 2024-04-24 ENCOUNTER — CLINICAL SUPPORT (OUTPATIENT)
Dept: REHABILITATION | Facility: HOSPITAL | Age: 84
End: 2024-04-24
Payer: MEDICARE

## 2024-04-24 DIAGNOSIS — R13.13 PHARYNGEAL DYSPHAGIA: Primary | ICD-10-CM

## 2024-04-24 PROCEDURE — 92526 ORAL FUNCTION THERAPY: CPT | Performed by: SPEECH-LANGUAGE PATHOLOGIST

## 2024-04-24 NOTE — PROGRESS NOTES
[facility]  Speech Therapy Treatment Note- Dysphagia  Date: 4/24/2024     Name: Pravin Ferrari   MRN: [id number]   Therapy Diagnosis:   Encounter Diagnosis   Name Primary?    Pharyngeal dysphagia Yes   Physician: Penny Del Valle NP  Physician Orders: Ambulatory Referral to Speech Therapy   Medical Diagnosis: Dysphagia, unspecified type [R13.10]     Visit #/ Visits Authorized: 8 / 25 (+eval)  Date of Evaluation:  2/21/24  Insurance Authorization Period:  2/21/2024 - 12/31/2024   Plan of Care Expiration Date:    5/1/24  Extended Plan of Care:  -   Progress Note: 3/21/24     Time In:  1:02 PM  Time Out: 1:40 PM  Total Billable Time: 38 mins      Precautions: Standard, Fall, Cognition, and Dysphagia  Subjective:   Patient reports: he is doing well. His wife reports he a little bit of coughing this week but much improved compared to previous.   He was compliant to home exercise program.     Response to previous treatment: NA    Pain Scale: no pain indicated throughout session  Objective:   TIMED  Procedure Min.   Cognitive Therapeutic Interventions, first 15 minutes CPT 76031  0   Cognitive Therapeutic Interventions, each additional 15 minutes CPT 10158  0         UNTIMED  Procedure   Dysphagia Therapy     Short Term Goals: (5 weeks) Current Progress:   Patient will independently demonstrate adequate use of effortful swallow technique to improve tongue base retraction and therefore overall swallow strength, safety and efficiency 60-75x per session.      Progressing/ Not Met 4/24/2024  Patient and his wife were educated regarding rationale for exercise with correct form/frequency of completion. Patient executed x75 in today's session, with cueing required to avoid taking large inhalation prior to swallow with prolonged breath hold. This improved over the course of the session. Patient encouraged to complete  per set, 2-3 sets per day as tolerated, taking sips of water as needed.     Patient will  complete Mendelsohn maneuver swallow strengthening exercise to increase hyolaryngeal elevation and excursion and therefore improve airway protection with minimal verbal cueing 20-30x per session.       Progressing/ Not Met 4/24/2024   Not formally addressed     Patient will complete CTAR/Shaker swallow strengthening exercise to improve pharyngeal constriction and therefore UES opening with minimal verbal cueing for 3, 1-minute holds and 30-50 repetitions.       Progressing/ Not Met 4/24/2024    Patient and his wife were educated regarding rationale for exercise with correct form/frequency of completion. Patient executed x3 1 minute holds and x55 repetitions in today's session, with cueing required to avoid thoracic movement, isolating movement of the jaw. This improved over the course of the session, as patient was able to count repetitions himself as session progressed. Patient encouraged to complete x3 1 minute holds, 30-45 repetitions per set, 2-3 sets per day.     Patient will complete Edith exercise to improve pharyngeal stripping wave/constriction with minimal verbal cueing 20-30 times per session.       Progressing/ Not Met 4/24/2024  Patient and his wife were educated regarding rationale for exercise with correct form/frequency of completion. Patient executed x10 in today's session, with cueing required to continue holding tongue during swallow and taking sips of water as needed OUTSIDE of exercise completion. This improved over the course of the session. Patient encouraged to complete 20-30 per set, 2-3 sets per day as tolerated, taking sips of water as needed.     Patient will demonstrate the ability to adequately self-monitor swallowing skills and perform appropriate compensatory techniques with 90% accuracy to improve swallow safety and/or efficiency.       Progressing/ Not Met 4/24/2024   Not formally addressed     Patient and/or family will be educated on and implement adequate oral hygiene  independently 2-3 times per day.       Progressing/ Not Met 4/24/2024    Patient's wife educated regarding importance of oral hygiene as it relates to aspiration-related pneumonia. Patient encouraged to brush his teeth 2-3 times daily. They expressed understanding of information provided.      Patient and/or family will participate in dysphagia education including anatomy and physiology of swallow mechanism, clinical signs of aspiration, etc. with returned demonstration of information.       Goal Met 2/28/2024  Patient and his wife educated regarding MBSS findings and recommendations including anatomy/physiology of normal vs impaired swallow, rationale for each of exercises recommended, and importance of oral hygiene and use of strategies. They expressed understanding of all information provided.         Patient Education/Response:   Patient educated regarding the following:  Patient and his wife educated regarding MBSS findings and recommendations including anatomy/physiology of normal vs impaired swallow, rationale for each of exercises recommended, and importance of oral hygiene and use of strategies. They expressed understanding of all information provided.    Home program established: Patient instructed to continue prior program  Patient verbalized understanding to all above education provided.     See Electronic Medical Record under Patient Instructions for exercises provided throughout therapy.  Assessment:   Patient executed all dysphagia exercises as recommended in today's session with increased repetitions across exercises compared to previous. Patient also without overt signs of aspiration throughout trials, for intake of thin liquids and his favorite candy, Snickers. Discussed specifically avoiding breath holding for execution of effortful swallows, which patient was able to execute correctly given consistent verbal cueing. Patient's wife expressed understanding and will monitor this week. Will plan to  progress per patient tolerance in next session, with likely discharge at that time.     Pravin is progressing well towards his goals. Current goals remain appropriate. Goals to be updated as necessary.     Patient prognosis is Good. Patient will continue to benefit from skilled outpatient speech and language therapy to address the deficits listed in the problem list on initial evaluation, provide patient/family education and to maximize patient's level of independence in the home and community environment.     Medical necessity is demonstrated by the following IMPAIRMENTS:  Dysphagia: Decreased safety and efficiency of swallowing function on PO intake without overt s/s of aspiration for the highest diet level.     Barriers to Therapy: NA  Patient's spiritual, cultural and educational needs considered and patient agreeable to plan of care and goals.  Plan:   Continue Plan of Care with focus on rehabilitation and compensation for dysphagia, impacting safety and efficiency of oral intake to meet nutrition/hydration needs.     Suad Velarde MA, CCC-SLP  Speech Language Pathologist  4/24/2024

## 2024-05-01 ENCOUNTER — CLINICAL SUPPORT (OUTPATIENT)
Dept: REHABILITATION | Facility: HOSPITAL | Age: 84
End: 2024-05-01
Payer: MEDICARE

## 2024-05-01 DIAGNOSIS — R13.13 PHARYNGEAL DYSPHAGIA: Primary | ICD-10-CM

## 2024-05-01 PROCEDURE — 92526 ORAL FUNCTION THERAPY: CPT | Performed by: SPEECH-LANGUAGE PATHOLOGIST

## 2024-05-01 NOTE — PROGRESS NOTES
[facility]  Speech Therapy Treatment Note/Discharge Summary- Dysphagia  Date: 5/1/2024     Name: Pravin Ferrari   MRN: [id number]   Therapy Diagnosis:   Encounter Diagnosis   Name Primary?    Pharyngeal dysphagia Yes   Physician: Penny Del Valle NP  Physician Orders: Ambulatory Referral to Speech Therapy   Medical Diagnosis: Dysphagia, unspecified type [R13.10]     Visit #/ Visits Authorized: 9 / 25 (+eval)  Date of Evaluation:  2/21/24  Insurance Authorization Period:  2/21/2024 - 12/31/2024   Plan of Care Expiration Date:    5/1/24  Extended Plan of Care:  -   Progress Note: 3/21/24     Date of Last visit: 5/1/2024   Total Visits Received: 10  Cancelled Visits: 1  No Show Visits: 0    Time In:  1:15 PM  Time Out: 1:58 PM  Total Billable Time: 43 mins      Precautions: Standard, Fall, Cognition, and Dysphagia  Subjective:   Patient reports: he is doing well. His wife reports he had very minimal coughing this week, and overall swallowing/meals are going much better.   He was compliant to home exercise program.     Response to previous treatment: NA    Pain Scale: no pain indicated throughout session  Objective:   TIMED  Procedure Min.   Cognitive Therapeutic Interventions, first 15 minutes CPT 06118  0   Cognitive Therapeutic Interventions, each additional 15 minutes CPT 34345  0         UNTIMED  Procedure   Dysphagia Therapy     Short Term Goals: (5 weeks) Current Progress:   Patient will independently demonstrate adequate use of effortful swallow technique to improve tongue base retraction and therefore overall swallow strength, safety and efficiency 60-75x per session.      Goal Met 5/1/2024  Patient and his wife were educated regarding rationale for exercise with correct form/frequency of completion. Patient executed x100 in today's session, with cueing required to avoid taking large inhalation prior to swallow with prolonged breath hold. This improved over the course of the session. Patient  encouraged to complete  per set, 2-3 sets per day as tolerated, taking sips of water as needed.     Patient will complete CTAR/Shaker swallow strengthening exercise to improve pharyngeal constriction and therefore UES opening with minimal verbal cueing for 3, 1-minute holds and 30-50 repetitions.       Goal Met 5/1/2024    Patient and his wife were educated regarding rationale for exercise with correct form/frequency of completion. Patient executed x3 1 minute holds and x60 repetitions in today's session, with cueing required to avoid thoracic movement, isolating movement of the jaw. This improved over the course of the session, as patient was able to count repetitions himself as session progressed. Patient encouraged to complete x3 1 minute holds, 30-45 repetitions per set, 2-3 sets per day.     Patient will complete Edith exercise to improve pharyngeal stripping wave/constriction with minimal verbal cueing 20-30 times per session.       Goal Met 5/1/2024  Patient and his wife were educated regarding rationale for exercise with correct form/frequency of completion. Patient executed x10 in today's session, with cueing required to continue holding tongue during swallow and taking sips of water as needed OUTSIDE of exercise completion. This improved over the course of the session. Patient encouraged to complete 20-30 per set, 2-3 sets per day as tolerated, taking sips of water as needed.     Patient and/or family will be educated on and implement adequate oral hygiene independently 2-3 times per day.       Goal Met 5/1/2024    Patient's wife educated regarding importance of oral hygiene as it relates to aspiration-related pneumonia. Patient encouraged to brush his teeth 2-3 times daily. They expressed understanding of information provided.      Patient and/or family will participate in dysphagia education including anatomy and physiology of swallow mechanism, clinical signs of aspiration, etc. with returned  demonstration of information.       Goal Met 2/28/2024  Patient and his wife educated regarding MBSS findings and recommendations including anatomy/physiology of normal vs impaired swallow, rationale for each of exercises recommended, and importance of oral hygiene and use of strategies. They expressed understanding of all information provided.         Patient Education/Response:   Patient educated regarding the following:  Patient and his wife educated regarding MBSS findings and recommendations including anatomy/physiology of normal vs impaired swallow, rationale for each of exercises recommended, and importance of oral hygiene and use of strategies. They expressed understanding of all information provided.    Home program established: Patient instructed to continue prior program  Patient verbalized understanding to all above education provided.     See Electronic Medical Record under Patient Instructions for exercises provided throughout therapy.  Assessment:     Assessment of Current Status: Patient executed all dysphagia exercises as recommended in today's session with increased repetitions across exercises compared to previous. Patient also without overt signs of aspiration throughout trials, for intake of thin liquids, a dum dum sucker, and bites of dry and crumbly francisca crackers. Discussed specifically avoiding breath holding for execution of effortful swallows, which patient was able to execute correctly given consistent verbal cueing. Patient has progressed toward less frequent overt signs of aspiration during meals. He continues to demonstrate impulsivity during meals with large and sequential bites and sips, but with overall less coughing during meals than prior to initiation of therapy. Patient's wife is consistent with implementation of HEP with  at home. They are in agreement with plan for discharge and will follow up given any changes in functioning.      Goals: see progress toward goals met  above.    Long Term Goals:  Patient will maintain adequate hydration/nutrition with optimum safety and efficiency of swallowing function on PO intake for IDDSI 7/0 diet level.      Discharge reason: Patient has met all of his goals and Patient has reached the maximum rehab potential for the present time  Plan:   This patient is discharged from Speech Therapy.     Suad Velarde MA, CCC-SLP  Speech Language Pathologist  5/1/2024

## 2024-05-21 ENCOUNTER — OFFICE VISIT (OUTPATIENT)
Dept: NEUROLOGY | Facility: CLINIC | Age: 84
End: 2024-05-21
Payer: MEDICARE

## 2024-05-21 DIAGNOSIS — I69.30 HISTORY OF CVA WITH RESIDUAL DEFICIT: ICD-10-CM

## 2024-05-21 DIAGNOSIS — F02.818 MAJOR NEUROCOGNITIVE DISORDER DUE TO MULTIPLE ETIOLOGIES WITH BEHAVIORAL DISTURBANCE: Primary | ICD-10-CM

## 2024-05-21 DIAGNOSIS — I67.2 CEREBRAL ATHEROSCLEROSIS: Chronic | ICD-10-CM

## 2024-05-21 PROCEDURE — 96116 NUBHVL XM PHYS/QHP 1ST HR: CPT | Mod: S$GLB,,, | Performed by: STUDENT IN AN ORGANIZED HEALTH CARE EDUCATION/TRAINING PROGRAM

## 2024-05-21 PROCEDURE — 99999 PR PBB SHADOW E&M-EST. PATIENT-LVL I: CPT | Mod: PBBFAC,,, | Performed by: STUDENT IN AN ORGANIZED HEALTH CARE EDUCATION/TRAINING PROGRAM

## 2024-05-21 PROCEDURE — 99499 UNLISTED E&M SERVICE: CPT | Mod: S$GLB,,, | Performed by: STUDENT IN AN ORGANIZED HEALTH CARE EDUCATION/TRAINING PROGRAM

## 2024-05-21 NOTE — PROGRESS NOTES
NEUROPSYCHOLOGY CONSULT    Referral Information  NAME:  Pravin Ferrari DATE OF SERVICE: 2024   MRN#:  [id number] EDUCATION: 15   AGE: 83 y.o. HANDEDNESS: Right    : 1940 RACE: White   SEX: Male REFERRAL: Penny Del Valle NP,  Neurology; & José Miguel Harrington MD, Family Medicine; [facility]     Evaluation methods: I had the pleasure of seeing Pravin Ferrari on 2024 in person at the [facility], Department of Neurology. Data sources for the below report include review of the available medical record, an interview with the patient, and a collateral interview with their family with their expressed consent. At the outset of the appointment, the undersigned explained the rationale for the evaluation along with the limits of confidentiality; and verbal informed consent for this evaluation was obtained.    The chief complaint/medical necessity leading to consultation/medical necessity is: cognitive decline    NEUROPSYCHOLOGICAL EVALUATION - CONFIDENTIAL    SUMMARY/TREATMENT PLAN   Summary of History:  Mr. Ferrari is a 83 y.o., right-handed, White, male with 15 years of formal education. He was referred by his neurology nurse practitioner due to cognitive concerns in the context of concern for Alzheimer's disease vs. Vascular dementia. 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 Cognitive screening completed by his referring neurology team on 2024 was below normal limits (MoCA = 18/30). Most-recent neurologic exam completed on 2024 was documented as notable for side-based gait, poor posture, and absent bilateral achilles reflexes. Most-recent brain MRI completed on 2024 was documented as reflecting moderately-advanced white matter degeneration, a chronic left basal ganglia lacunar infarct with associated white matter  degeneration extending into the left cerebral peduncle, and a chronic periventricular white matter lacunar infarct, as below. Most-recent laboratory studies drawn on 10/24/2023 were interpreted as reflecting mild but stable anemia and LDL at goal by his primary care physician; multivitamin was recommended.     During interview, Mr. Ferrari generally deferred to his family to report his history, noting that he had not perceived significant cognitive challenges. His family reported the gradual onset of primary memory concerns as long as 3-5 years ago, with a generally slowly-worsening trajectory of cognitive changes punctuated by immediate worsening coinciding with CVAs in 01/2023 and 03/2023. Difficulties were reported at some level in all domains assessed; however, with particular emphasis on difficulties with memory, behavioral engagement and initiation, and his level of participation in conversations. Brief assessment during interview was notable for rapid forgetting with partial recovery from reminder cueing, and an expressive dysphasia with errors across naming, conversational word-finding, repetition, and speech praxis; but intact receptive language.       Emotionally, Mr. Ferrari and his family denied mood concerns. His family discussed neuropsychiatric symptoms in the form of new checking behaviors around the house at night, irritability, and prominent apathy. Functionally, Mr. Ferrari is dependent for all instrumental daily living skills due to cognitive concerns. His family additionally discussed new errors in dressing and bathing behaviors due to cognitive changes.     Mr. Ferrari has a history of the insidious onset and gradually worsening trajectory of primary memory concerns, with superimposed worsening at the time of known CVAs, which increases the risk of two separate organic etiologies for his cognitive concerns. He does not have psychological or medical concerns that would preclude gathering  neuropsychological test data at this time. As such, formal neuropsychological testing is clinically indicated in order to aid in differential diagnosis and treatment planning.      Diagnoses  1. 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 History of CVA with residual deficit        3. Cerebral atherosclerosis               PLAN/ RECOMMENDATIONS     Provider Recommendations:   On the basis of the above summary, neuropsychological testing is clinically indicated at this time. Mr. Ferrari will be scheduled for comprehensive neuropsychological testing. A detailed report including detailed diagnostic information and recommendations will be completed after testing has been completed.     Patient Recommendations:  The next step in your care is to complete neuropsychological testing. Our office staff will reach out to you to schedule an appointment for the testing portion of your evaluation. Please review your after visit summary for more information about your testing appointment.     The above plan/ recommendations were discussed with Mr. Ferrari during his appointment today.     Thank you for allowing me to participate in Mr. Shane care.  If you have any questions, please contact me at [phone].        _____________________  Constantino Richards, Ph.D.  Neuropsychologist  Department of Neuropsychology  [facility], [place]      HISTORY OF PRESENT ILLNESS: Mr. Pravin Ferrari is an 83 y.o., right-handed,  male with 15 years of education who was referred for a neuropsychological evaluation in the setting of dementia.     Onset and Course of Cognitive Concerns: Mr. Ferrari discussed that he was uncertain about when his challenges began. His family discussed that concerns have been present for approximately three years, and his son had discussed difficulties began at work, as long as 3-5 years ago. Mr. Ferrari reported that his cognitive skills have been slowly  worsening, though with superimposed worsening at the time of his strokes in 01/2023 (with slurred speech) and again in 03/2023 (with tingling in his feet). His family denied the presence of fluctuations.     Characterization of Cognitive Concerns  Attention/ working memory: Mr. Ferrari engages less with attention-demanding tasks such as word search and sodoku that used to be easy for him. His wife commented that he finds it difficult to track and engage with television shows he watches.  Processing speed: Mr. Ferrari family reported difficulties with slowed thinking speed.  Language: Mr. Ferrari family discussed that he does not contribute to conversations as much as he used to.  Visual-spatial/ navigation: Mr. Ferrari and his family denied perceptual concerns; they reported memory-related navigation errors.  Psychomotor: Mr. Ferrari has a limp on his right leg following his stroke; and he has slowed movements in part attributed to age.  Memory: Mr. Ferrair frequently repeats questions, forgets objects around the house, and quickly forgets information. His family does not perceive that he benefits from cueing or reminders; though noted that he is often able to find objects he's lost, not recalling where he had found them. He has had recent injuries, such as a cut to his head without recalling how this occurred.   Executive Functions: Mr. Ferrari no longer engages in decision-making, he is typically easy to please however when others make choices for him.  Orientation: Mr. Ferrari' wife reported difficulties with orientation to current events and his schedule.     Neuropsychiatric Symptoms:  Hallucinations: Denied  Delusional/Paranoid Thinking: Denied his communicating these concerns.   Apathy: Endorsed - he no longer engages in activities that he used to and is now more still and inert.  Irritability/Agitation: Endorsed that he has a far shorter fuse, becoming irritated if some such as his wife tell him what to do.  "  Disinhibition: Denied  Depression/Labile Mood: Denied   Anxiety: Denied  Checking Behavior: He often checks locks in the house to be sure things are secure, and that his family is safe before he goes to bed.     DAILY FUNCTIONING:  Living Environment: He lives with his wife in a single family home.     BASIC ADLS:  Feeding: independent, though his appetite is reduced, and there has been a strong sweets preference.   Dressing: He has made errors in recalling which of his clothes are clean; wearing yesterday's shirt and throwing the clean shirt in the laundry.  Bathing: independent although his wife discussed inefficiencies in bathing practices.   Toileting: Incontinence is present due to prostate cancer.     IADLS:  Support System: His wife, his daughter.   Appointment Management: His wife has always managed this activity. It has become far more difficult for him to track this.   Medication Compliance: He previously did not require regular medications with the exception of his blood pressure medication. Adjusting to medications has been a challenge. His wife uses a pill-box and his wife or his daughter provide medications for him.    Financial Management: Although he was previously excellent with finances, his wife has taken over this activity due to his requiring far more time and making errors due to forgetting.   Cooking: He used to cook breakfast and grill though has reduced engagement in these activities.   Driving: He has not driven since becoming lost in a familiar location approximately three years ago.      BRAIN HEALTH RISK FACTORS:  Hearing Loss: Endorsed - he does not wear hearing aides.   Vision Loss: Denied  Physical Activity: Reduced, he and his wife walk.   Social Isolation: Reduced social engagement, since they moved to their current house. He likes to go out to eat, and likes to get out of the house.   Falls: Denied  Sleep: his wife commented, "he sleeps good at night."    MEDICAL HISTORY: " Vega  has a past medical history of Acute CVA (cerebrovascular accident) (1/11/2023), Cancer, [place] (hard of hearing), and Hypertension.    NEUROIMAGING:  Results for orders placed or performed during the hospital encounter of 02/08/24   MRI Brain Without Contrast    Narrative    EXAMINATION:  MRI BRAIN WITHOUT CONTRAST    CLINICAL HISTORY:  Other amnesiaMemory loss;Mental status change, unknown cause;    TECHNIQUE:  Standard multiplanar noncontrast sequences of the brain.    COMPARISON:  MRI 01/11/2023    FINDINGS:  Mild-to-moderate atrophy is present.    There is a chronic left lacunar infarct with white matter degeneration extending into the left cerebral peduncle.    There are pontine white matter changes as well as periventricular and subcortical white matter changes.  Small chronic right periventricular white matter lacunar infarct.    No acute restricted diffusion.    Pituitary gland region appears normal.    No significant chronic hemorrhage.    Skull base appears normal.    The diffusion sequence is negative.      Impression    No acute abnormality.  Moderately advanced atrophy and white matter degeneration.    Chronic left basal ganglia lacunar infarct with associated white matter degeneration extending into the left cerebral peduncle.    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      Electronically signed by: Ezequiel Coronel MD  Date:    02/08/2024  Time:    14:10   Results for orders placed or performed during the hospital encounter of 03/07/23   CT Head Without Contrast    Narrative    EXAMINATION:  CT HEAD WITHOUT CONTRAST    CLINICAL HISTORY:  Stroke, follow up;    TECHNIQUE:  Axial CT images obtained throughout the head without intravenous contrast.    COMPARISON:  01/11/2023    FINDINGS:  Negative for acute hemorrhage, mass effect, extraaxial collection, hydrocephalus.    There is good gray white matter differentiation.    Stable moderate to marked chronic small vessel  ischemic changes throughout the white matter.  Calcified plaque skull-base vasculature.    The calvarium is unremarkable with no fractures.      Impression    Negative for acute intracranial abnormality.    All CT scans at this facility are performed  using dose modulation techniques as appropriate to performed exam including the following:  automated exposure control; adjustment of mA and/or kV according to the patients size (this includes techniques or standardized protocols for targeted exams where dose is matched to indication/reason for exam: i.e. extremities or head);  iterative reconstruction technique.      Electronically signed by: Teo Turner MD  Date:    03/07/2023  Time:    07:22       Current medications: Mr. Ferrari has a current medication list which includes the following prescription(s): amlodipine, aspirin, atorvastatin, clopidogrel, donepezil, memantine, multivitamin, and [DISCONTINUED] hydrochlorothiazide.     Review of patient's allergies indicates:   Allergen Reactions    Lisinopril Swelling       PSYCHIATRIC HISTORY: Denied    SUICIDAL IDEATION:  History: Denied  Current Stressors: Cognitive changes  Active Suicidal Ideation:Denied  Plan/Intent: Denied    FAMILY HISTORY: family history includes Aneurysm in his father; Cerebral aneurysm in his father; No Known Problems in his mother; Prostate cancer in his brother and son.    PSYCHOSOCIAL HISTORY:   Education:   Level Attained: 12 and he did a 4-year certificate in theology from [place], though did not seek a Bachelor's degree.    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    Retired: Approximately 10 years ago    : He served in the [employer] after high school, for 8 years, as an .     Relationship Status/ Support Network:   : Yes for nearly 61  years.   : No   Children: 3, 6 grandchildren, and 4 great-grandchildren.     SUBSTANCE USE: Mr. Ferrari denied a history of problematic alcohol or recreational substance use and is a lifelong non-smoker.     MENTAL STATUS AND OBSERVATIONS:  APPEARANCE: Casually dressed and adequate grooming/hygiene.   ALERTNESS/ORIENTATION: Attentive and alert. Mr. Ferrari was oriented to person; loosely oriented to time, correctly generating the year and identifying the month and weekday with multiple choice cues; kamron oriented to place with multiple choice cues; disoriented to circumstances; and loosely oriented to current events also with cueing.   GAIT/MOTOR: He sat and arose from a chair independently although was unsteady while doing so. He demonstrated difficulties with dysdiadochokinesia, and bilateral, left hand worse than right hand ideomotor praxis. Finger tapping was bilaterally slowed and diminished in amplitude, worse with his right than left hand.  SENSORY: Speech was sparse, soft in volume, and mildly dysarthric. He had difficulties with speech praxis and difficulties with repetition on examination. Expressive language was notable for frequent word-finding errors for nouns in casual speech; and word-finding difficulty when asked to identify objects in the office.   MOOD/AFFECT: Mood was generally upbeat and affect was mood-congruent.   INTERPERSONAL BEHAVIOR: Rapport was quickly and easily established. Mr. Ferrari remained polite and pleasant.  THOUGHT PROCESSES: Thoughts seemed logical and goal-directed within short durations. He quickly forgot information. On informal assessment in the office, he recalled 0/3 objects presented after an approximate 1-minute delay with some benefit from recognition cueing.    PLANNED TESTS     Tests Administered (manual norms used unless otherwise indicated): [employer] Test of Premorbid Functioning (TOPF); Smilax Naming Test (15-item [place] Norms); [place]  Making Test (TMT A/ B, [facility] norms); Repeatable Battery for the Assessment of Neuropsychological Status (RBANS, form A); Dementia Rating Scale, 2nd Ed. (DRS-2); Activities of Daily Living Questionnaire (ADLQ, caregiver form); Neuropsychiatric Inventory (NPI-Q); Geriatric Depression Scale (GDS); Tirado Anxiety Inventory (SUSIE).    Billing Documentation     Time spent conducting face to face interview with the patient: 60 minutes; [id number].      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

## 2024-05-24 DIAGNOSIS — G30.9 MODERATE MAJOR NEUROCOGNITIVE DISORDER DUE TO ALZHEIMER'S DISEASE WITH BEHAVIORAL DISTURBANCE: ICD-10-CM

## 2024-05-24 DIAGNOSIS — F03.A0 MILD DEMENTIA WITHOUT BEHAVIORAL DISTURBANCE, PSYCHOTIC DISTURBANCE, MOOD DISTURBANCE, OR ANXIETY, UNSPECIFIED DEMENTIA TYPE: Chronic | ICD-10-CM

## 2024-05-24 DIAGNOSIS — F02.B18 MODERATE MAJOR NEUROCOGNITIVE DISORDER DUE TO ALZHEIMER'S DISEASE WITH BEHAVIORAL DISTURBANCE: ICD-10-CM

## 2024-05-24 DIAGNOSIS — Z86.73 HISTORY OF CVA (CEREBROVASCULAR ACCIDENT) WITHOUT RESIDUAL DEFICITS: ICD-10-CM

## 2024-05-24 DIAGNOSIS — I67.2 CEREBRAL ATHEROSCLEROSIS: Chronic | ICD-10-CM

## 2024-05-24 DIAGNOSIS — R41.3 OTHER AMNESIA: ICD-10-CM

## 2024-05-24 RX ORDER — MEMANTINE HYDROCHLORIDE 10 MG/1
10 TABLET ORAL 2 TIMES DAILY
Qty: 180 TABLET | Refills: 3 | Status: SHIPPED | OUTPATIENT
Start: 2024-05-24 | End: 2025-05-24

## 2024-05-24 NOTE — TELEPHONE ENCOUNTER
----- Message from Korin Oconnor sent at 5/24/2024  9:42 AM CDT -----  Contact: Radha, wife  ..Type:  Patient Requesting Call    Who Called: Radha, wife   Does the patient know what this is regarding?: refill memantine (NAMENDA) 10 MG Tab  Would the patient rather a call back or a response via [facility]?  call  Best Call Back Number: .[phone]  Additional Information:       .  [id number] - [place] - [address] [address] [facility]  [address] [place] [place] [address]  Phone: [phone] Fax: [phone]

## 2024-06-11 ENCOUNTER — OFFICE VISIT (OUTPATIENT)
Dept: NEUROLOGY | Facility: CLINIC | Age: 84
End: 2024-06-11
Payer: MEDICARE

## 2024-06-11 DIAGNOSIS — I67.2 CEREBRAL ATHEROSCLEROSIS: ICD-10-CM

## 2024-06-11 DIAGNOSIS — F02.B18 MODERATE MAJOR NEUROCOGNITIVE DISORDER DUE TO MULTIPLE ETIOLOGIES WITH BEHAVIORAL DISTURBANCE: Primary | ICD-10-CM

## 2024-06-11 DIAGNOSIS — I69.30 HISTORY OF CVA WITH RESIDUAL DEFICIT: ICD-10-CM

## 2024-06-11 PROCEDURE — 96132 NRPSYC TST EVAL PHYS/QHP 1ST: CPT | Mod: S$GLB,,, | Performed by: STUDENT IN AN ORGANIZED HEALTH CARE EDUCATION/TRAINING PROGRAM

## 2024-06-11 PROCEDURE — 96138 PSYCL/NRPSYC TECH 1ST: CPT | Mod: S$GLB,,, | Performed by: STUDENT IN AN ORGANIZED HEALTH CARE EDUCATION/TRAINING PROGRAM

## 2024-06-11 PROCEDURE — 96133 NRPSYC TST EVAL PHYS/QHP EA: CPT | Mod: S$GLB,,, | Performed by: STUDENT IN AN ORGANIZED HEALTH CARE EDUCATION/TRAINING PROGRAM

## 2024-06-11 PROCEDURE — 96139 PSYCL/NRPSYC TST TECH EA: CPT | Mod: S$GLB,,, | Performed by: STUDENT IN AN ORGANIZED HEALTH CARE EDUCATION/TRAINING PROGRAM

## 2024-06-11 PROCEDURE — 99999 PR PBB SHADOW E&M-EST. PATIENT-LVL I: CPT | Mod: PBBFAC,,, | Performed by: STUDENT IN AN ORGANIZED HEALTH CARE EDUCATION/TRAINING PROGRAM

## 2024-06-11 PROCEDURE — 99499 UNLISTED E&M SERVICE: CPT | Mod: S$GLB,,, | Performed by: STUDENT IN AN ORGANIZED HEALTH CARE EDUCATION/TRAINING PROGRAM

## 2024-06-12 NOTE — PROGRESS NOTES
NEUROPSYCHOLOGY CONSULT    Referral Information  NAME:  Pravin Ferrari DATE OF SERVICE: 2024   MRN#:  [id number] EDUCATION: 15   AGE: 83 y.o. HANDEDNESS: Right    : 1940 RACE: White   SEX: Male REFERRAL: José Miguel Harrington MD, Family Medicine; & Penny Del Valle NP, Neurology; [facility]     Evaluation methods: I had the pleasure of seeing Pravin Ferrari on 2024 in person at the [facility], Department of Neurology. Data sources for the below report include review of the available medical record, an interview with the patient and his family on 2024, and administration of a series of neuropsychological tests listed in the Results section of this report. At the outset of the appointment, the undersigned explained the rationale for the evaluation along with the limits of confidentiality; and verbal informed consent for this evaluation was obtained.    The chief complaint/medical necessity leading to consultation/medical necessity is: cognitive decline    NEUROPSYCHOLOGICAL EVALUATION - CONFIDENTIAL    SUMMARY/TREATMENT PLAN   Summary of History:  Mr. Ferrari is a 83 y.o., right-handed, White, male with 15 years of formal education. He was referred by his neurology nurse practitioner due to cognitive concerns in the context of concern for Alzheimer's disease vs. Vascular dementia. 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 Cognitive screening completed by his referring neurology team on 2024 was below normal limits (MoCA = 18/30). Most-recent neurologic exam completed on 2024 was documented as notable for side-based gait, poor posture, and absent bilateral achilles reflexes. Most-recent brain MRI completed on 2024 was documented as reflecting moderately-advanced white matter degeneration, a chronic left  basal ganglia lacunar infarct with associated white matter degeneration extending into the left cerebral peduncle, and a chronic periventricular white matter lacunar infarct, as below. Most-recent laboratory studies drawn on 10/24/2023 were interpreted as reflecting mild but stable anemia and LDL at goal by his primary care physician; multivitamin was recommended.      During interview, Mr. Ferrari generally deferred to his family to report his history, noting that he had not perceived significant cognitive challenges. His family reported the gradual onset of primary memory concerns as long as 3-5 years ago, with a generally slowly-worsening trajectory of cognitive changes punctuated by immediate worsening coinciding with CVAs in 01/2023 and 03/2023. Difficulties were reported at some level in all domains assessed; however, with particular emphasis on difficulties with memory, behavioral engagement and initiation, and his level of participation in conversations. Brief assessment during interview was notable for rapid forgetting with partial recovery from reminder cueing, and an expressive dysphasia with errors across naming, conversational word-finding, repetition, and speech praxis; but intact receptive language.        Emotionally, Mr. Ferrari and his family denied mood concerns. His family discussed neuropsychiatric symptoms in the form of new checking behaviors around the house at night, irritability, and prominent apathy. Functionally, Mr. Ferrari is dependent for all instrumental daily living skills due to cognitive concerns. His family additionally discussed new errors in dressing and bathing behaviors due to cognitive changes.     Test Results:    Burns Findings:   Mr. Ferrari is a man of estimated average baseline cognitive functioning on the basis of demographic data and his performance on a single-word reading task. Overall performances on a neuropsychological screening and tracking test and a  dementia-specific screening and staging measure were exceptionally low relative to age-matched peers suggesting generalized cognitive decline relative to his baseline. In this context, he demonstrated relative sparing across tests of visuospatial skills, basic abstraction and conceptualization skills on a dementia screening test, phonemic verbal fluency, a test of processing speed with low motor speed demands, and simple attention to visual and auditory stimuli on a dementia screening test. Performances were otherwise entirely below normal limits. Qualitatively, he had greatest difficulties across tests of learning and memory without clear benefit from recognition cueing, semantic expressive language and to a lesser extent receptive language skills, and a test of information processing speed requiring visual search and motor speed. Mr. Ferrari did not endorse clinically significant depression or anxiety symptoms, consistent with his report during interview.     Mr. Ferrari's wife endorsed clinically-significant declines in ADL functioning across all domains on a standard caregiver questionnaire: self care, household care, employment & recreation, shopping & money, travel, and communication. On a standard neuropsychiatric inventory, Ms. Ferrari reported mild neuropsychiatric symptoms related to agitation and increased sweets preference; and moderately severe symptoms of anxiety, apathy, irritability, motor disturbances, and hypersomnolence.    Data Synthesis: Cognitive test results suggest a mixed pattern of frontal-subcortical systems dysfunction consistent with his history and territory of CVAs; as well as cortical mesial and extra-mesial temporal systems dysfunction not better explained by his cerebrovascular history.    Diagnostic Considerations: Mr. Ferrari has evidence for clinically significant cognitive decline relative to his baseline. In this context, there is evidence for functional decline due to his  cognitive changes on the basis of interview and report of his caregiver on a standard inventory. As such, he qualifies for a major neurocognitive disorder diagnosis. As above, behavioral disturbances are present, troubling, and multifactorial. I do not suspect these concerns to be related to underlying mood or other psychological processes.     Etiologic Considerations: Neuropsychological test results and his pattern of gradually-worsening cognitive concerns over the last 3-5 years during his late 70's superimposed on sudden changes at the time of his known CVAs support multiple etiologies. Specifically, co-occurring Alzheimer's and cerebrovascular disease.     Diagnoses  1. 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 History of CVA with residual deficit        3. Cerebral atherosclerosis             Provider Recommendations:   Mr. Ferrari will be encouraged to follow up with his referring provider in order to integrate these findings into the overall context of his clinical care, and to implement any of the below recommendations as appropriate.     Continued close management of Mr. Ferrari's cerebrovascular risk factors will be important moving forward.     I agree with your plan for Aricept and Namenda.     Continue with conservative management of behavioral disturbance. I will provide behavioral management recommendations to Mr. Ferrari and his family. If these are ineffective and symptoms are distressing consider pharmacologic management of behavioral disturbance.     Repeat assessment is recommended in 12 months, in order to assess for changes over time and update his treatment plan. Scores from this assessment should be used as a baseline for comparison at that time.     Patient Recommendations:  The next step in your care is to follow up with your referring provider in order to help with continued  management of your care. The below recommendations may help you and your family compensate for your difficulties and better understand the reasons for your cognitive changes.     Good job controlling high cholesterol and high blood pressure! At this time the single most important thing you can do to reduce your risk of future cognitive decline is to reduce your risk of stroke. Follow with your primary care and neurology team for management of medical risk factors.     Your current medications for cognitive decline are appropriate given the suspected cause of your cognitive change.s     There are steps you can take to improve your long-term brain health and reduce your risk for cognitive decline in the future. I encourage you to follow the recommendations in your daily life:  Engage in physical activity (i.e., something that gets your heart rate up) totaling at least 15-30 minutes per day. Regular exercise is very important for both long-term health and stress management. Walking, hiking, elliptical, stationary biking, dancing, and yoga are all good options. If you have any physical limitations or health conditions that may impact your ability to exercise, consult with your doctor and/or a physical therapist to develop a regimen that works for you.  Work closely with your doctor(s) to manage any vascular conditions such as high blood pressure, high cholesterol, and diabetes. Follow the management guidelines from the American Heart Association at www.heart.org.  Remain mentally engaged by keeping socially active, reading, learning new skills, playing games, or participating in a hobby.  Get a good night's sleep by avoiding screens 30-60 minutes before bed and sticking to a regular sleep schedule.  Eat a balanced, heart-healthy diet that is low in salt, saturated fats, and added sugar. The Mediterranean diet has been shown to be especially healthy; studies show that it may reduce the risk of developing dementia and  "slow the rate of age-related cognitive decline.      The below recommendations may help manage behavioral changes, assist in preventing delirium, and provide useful community resources.     Behavioral Management Strategies:  Remember that you cannot reason with acute psychosis or confusion. Unless there is an immediate safety issue, there is no need to challenge or correct the person.  For example, if Mr. Ferrari is hallucinating, do not argue him that what they are seeing is not real. If they are expressing paranoia, empathize gently with their fear, and attempt to redirect them to a different activity.   If the Mr. Ferrari is particularly stuck on a topic or activity, as long as the activity is safe and is not worsening their agitation, you don't need to intervene.   Communicate calmly, simply, and concisely  Reassure Mr. Ferrari that they are safe and you are here to help  Try not to express irritation or anger  Speak quietly and calmly, do not shout or threaten Mr. Ferrari. Avoid provocation.   Establish verbal contact and provide orientation and reassurance.  Attempt to identify Mr. Ferrari's wants and feelings, listen to what they are saying, and reflect those wants and feelings back to them.  Dont use sarcasm as a weapon   Set clear limits on behavior in a calm voice ("You cannot hit the nurse.")  Offer choices and optimism ("Which toothpaste would you like to use today?")  Attempt to redirect Mr. Ferrari to an alternative behavior ("Can you come help me with this?)  Avoid saying things like, "We already went over this!" "You can't keep doing this." "I already explained this to you"  Instead, simply nod calmly and acknowledge what Mr. Ferrari is saying ("Yes, that makes sense."), and then request their help or company in a different behavior.   Having a mental list of activities Mr. Ferrari enjoys can be helpful with redirection. For example, do they enjoy going for walks? Eating a piece of candy?   If " redirection becomes challenging, you can place objects that your family member enjoys strategically in the home in order to use for distraction. This is particularly useful if there are items that they often talk about or frequently ask you questions about; or if they are visually striking. Once you focus the person on this object, talk about it briefly until they are calm and then attempt to redirect to a new behavior.   Sometimes activities which are repetitive can be calming, particularly if there is a comforting sensory element. For example, pt may enjoy folding soft towels or laundry.  Consider using music they know and enjoy such as their favorite songs and artists.  Limit overstimulation  Decrease distractions by turning off the TV, computer, any fluorescent lights that hum, etc.  Ask any casual visitors to leave--the fewer people the better  If the person is very agitated, avoid touching them, and respect their personal space  Sit down and ask the person to sit down also    Preventative strategies:  Try to help the patient develop a routine and stick to it  Address all immediate safety issues  Does Mr. Ferrari still have access to the car and keys? Take the keys away, or disconnect the car battery.  Are they wandering at night? Install locks on the outside doors. A keypad lock works well. Alarms on bedroom doors can also be helpful.   Are they turning on the stove and risking a fire? If you cannot limit their access to the kitchen, you may need to unplug dangerous devices any time you are not in the room.   If Mr. Ferrari is exhibiting poor judgment throughout the day, they likely need someone supervising them at all times.   Do they still have access to significant financial assets? Limit their access to accounts, and consult with a  if necessary.   Identify situations that seem to trigger agitation or a problematic behavior, and modify the person's environment to minimize or eliminate that trigger.  "  For example, is Mr. Ferrari's behavior worse if they don't get a good night's sleep? Or if they don't eat or drink enough? If so, prioritize those activities and build behavior plans to support them.  Do they get upset about not being allowed to answer the phone when it rings? Put all of the phones in the house on "silent."   Do they become paranoid that certain family members are trying to take advantage of them? Limit contact with the targeted family member as much as possible, and re-introduce them slowly after some time has passed.   Encourage independence in daily living whenever safely possible  Encourage collaborative decision-making regarding his care as well as daily activities  Encourage regular physical exercise  Address issues that may be impacting sleep   Ex: Urology consult for frequent nighttime urination, address chronic pain that may be interfering with sleep, moving to a different room if his roommate snores loudly, make sure the room is a comfortable temperature and he has adequate pillows and blankets  Ensure he gets out into the sunlight at least once per day to encourage regular circadian cycle  No caffeine, sugar, or large meals within two hours of bedtime   Make sure room at night is dark and quiet and minimize nighttime interruptions from staff or caregivers unless medically necessary   Try to help pt go to sleep and wake up at the same time every day  Monitor closely for worsening psychiatric symptoms (insomnia, social withdrawal, deterioration of personal hygiene, hostility, confusing or nonsensical speech, paranoia, hallucinations) and intervene promptly. Assess for possible antecedents (possible causes in their environment that came before the symptom), modify environment appropriately.      Sleep Hygiene: Poor sleep has a negative effect on cognition. Several strategies have been shown to improve sleep:   Caffeine intake in the afternoon and evening, as well as stuffing oneself at " supper, can decrease the quality of restful sleep throughout the night.   Bedtime and wake-up times should be consistent every night and morning so the body becomes used to a single routine, even on the weekends.  Engage in daily physical activity, but not 2-3 hours before bedtime.   No technology use (television, computer, iPad) 1-2 hours before bed.   Have a wind down routine (e.g., soft lights in the house, bath before bed, reduced fluid intake, songs, reading, less noise) to promote sleep readiness.   Visit the www.sleepfoundation.org for more strategies.     Practice good cognitive/brain health hygiene:  Engage in regular exercise, which increases alertness and arousal and can improve attention and focus.  Consider lower impact exercises, such as yoga or light walking.  Get a good nights sleep, as this can enhance alertness and cognition.  Eat healthy foods and balanced meals. It is notable that research indicates certain nutrients may aid in brain function, such as B vitamins (especially B6, B12, and folic acid), antioxidants (such as vitamins C and E, and beta carotene), and Omega-3 fatty acids. Talk with your physician or nutritionist about whats right for you.   Keep your brain active. Find activities to stay mentally active, such as reading, games (cards, checkers), puzzles (crosswords, Sudoku, jig saw), crafts (models, woodworking), gardening, or participating in activities in the community.  Stay socially engaged. Continue staying active with your family and friends.    Resources:   [facility] (www.http://alzbr.org) have good local caregiver and patient resources.   Consider resources for support through the Governors Office of Elderly Affairs (http://goea.[place].gov/), [facility] of the Alzheimers Association (www.alz.org/[place]/), the Family Caregiver [place] (www.caregiver.org), and the American Psychological Association  (http://www.apa.org/pi/about/publications/caregivers/consumers/index.aspxconsumers/index.aspx).    Prepare for the future:  The pt and caregivers should consider formal arrangements to allow a designated person to make medical and financial decisions for the pt, should he/she become unable to do so.  Options to consider include designating a healthcare proxy, medical and/or financial power of , and completing advanced directives for healthcare decisions and estate planning (e.g., finalizing a will).  If cost is prohibitive, [facility] [employer] (https://[employer].org/) provides free  for individuals with low income.        Mr. Ferrari will be provided the results of the evaluation.     Thank you for allowing me to participate in Mr. Shane care.  If you have any questions, please contact me at [phone].        _____________________  Constantino Richards, Ph.D.  Neuropsychologist  Department of Neuropsychology  [facility], [place]    HISTORY OF PRESENT ILLNESS: Mr. Pravin Ferrari is an 83 y.o., right-handed,  male with 15 years of education who was referred for a neuropsychological evaluation in the setting of dementia.      Onset and Course of Cognitive Concerns: Mr. Ferrari discussed that he was uncertain about when his challenges began. His family discussed that concerns have been present for approximately three years, and his son had discussed difficulties began at work, as long as 3-5 years ago. Mr. Ferrari reported that his cognitive skills have been slowly worsening, though with superimposed worsening at the time of his strokes in 01/2023 (with slurred speech) and again in 03/2023 (with tingling in his feet). His family denied the presence of fluctuations.      Characterization of Cognitive Concerns  Attention/ working memory: Mr. Ferrari engages less with attention-demanding tasks such as word search and sodoku that used to be easy for him. His wife commented that he  finds it difficult to track and engage with television shows he watches.  Processing speed: Mr. Ferrari family reported difficulties with slowed thinking speed.  Language: Mr. Ferrari family discussed that he does not contribute to conversations as much as he used to.  Visual-spatial/ navigation: Mr. Ferrari and his family denied perceptual concerns; they reported memory-related navigation errors.  Psychomotor: Mr. Ferrari has a limp on his right leg following his stroke; and he has slowed movements in part attributed to age.  Memory: Mr. Ferrari frequently repeats questions, forgets objects around the house, and quickly forgets information. His family does not perceive that he benefits from cueing or reminders; though noted that he is often able to find objects he's lost, not recalling where he had found them. He has had recent injuries, such as a cut to his head without recalling how this occurred.   Executive Functions: Mr. Ferrari no longer engages in decision-making, he is typically easy to please however when others make choices for him.  Orientation: Mr. Ferrari' wife reported difficulties with orientation to current events and his schedule.      Neuropsychiatric Symptoms:  Hallucinations: Denied  Delusional/Paranoid Thinking: Denied his communicating these concerns.   Apathy: Endorsed - he no longer engages in activities that he used to and is now more still and inert.  Irritability/Agitation: Endorsed that he has a far shorter fuse, becoming irritated if some such as his wife tell him what to do.   Disinhibition: Denied  Depression/Labile Mood: Denied   Anxiety: Denied  Checking Behavior: He often checks locks in the house to be sure things are secure, and that his family is safe before he goes to bed.      DAILY FUNCTIONING:  Living Environment: He lives with his wife in a single family home.      BASIC ADLS:  Feeding: independent, though his appetite is reduced, and there has been a strong sweets  "preference.   Dressing: He has made errors in recalling which of his clothes are clean; wearing yesterday's shirt and throwing the clean shirt in the laundry.  Bathing: independent although his wife discussed inefficiencies in bathing practices.   Toileting: Incontinence is present due to prostate cancer.      IADLS:  Support System: His wife, his daughter.   Appointment Management: His wife has always managed this activity. It has become far more difficult for him to track this.   Medication Compliance: He previously did not require regular medications with the exception of his blood pressure medication. Adjusting to medications has been a challenge. His wife uses a pill-box and his wife or his daughter provide medications for him.    Financial Management: Although he was previously excellent with finances, his wife has taken over this activity due to his requiring far more time and making errors due to forgetting.   Cooking: He used to cook breakfast and grill though has reduced engagement in these activities.   Driving: He has not driven since becoming lost in a familiar location approximately three years ago.       BRAIN HEALTH RISK FACTORS:  Hearing Loss: Endorsed - he does not wear hearing aides.   Vision Loss: Denied  Physical Activity: Reduced, he and his wife walk.   Social Isolation: Reduced social engagement, since they moved to their current house. He likes to go out to eat, and likes to get out of the house.   Falls: Denied  Sleep: his wife commented, "he sleeps good at night."     MEDICAL HISTORY: Mr. Ferrari  has a past medical history of Acute CVA (cerebrovascular accident) (1/11/2023), Cancer, [place] (hard of hearing), and Hypertension.     NEUROIMAGING:      Results for orders placed or performed during the hospital encounter of 02/08/24   MRI Brain Without Contrast     Narrative     EXAMINATION:  MRI BRAIN WITHOUT CONTRAST     CLINICAL HISTORY:  Other amnesiaMemory loss;Mental status change, unknown " cause;     TECHNIQUE:  Standard multiplanar noncontrast sequences of the brain.     COMPARISON:  MRI 01/11/2023     FINDINGS:  Mild-to-moderate atrophy is present.     There is a chronic left lacunar infarct with white matter degeneration extending into the left cerebral peduncle.     There are pontine white matter changes as well as periventricular and subcortical white matter changes.  Small chronic right periventricular white matter lacunar infarct.     No acute restricted diffusion.     Pituitary gland region appears normal.     No significant chronic hemorrhage.     Skull base appears normal.     The diffusion sequence is negative.        Impression     No acute abnormality.  Moderately advanced atrophy and white matter degeneration.     Chronic left basal ganglia lacunar infarct with associated white matter degeneration extending into the left cerebral peduncle.     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        Electronically signed by:Ezequiel Coronel MD  Date:                                        02/08/2024  Time:                                       14:10   Results for orders placed or performed during the hospital encounter of 03/07/23   CT Head Without Contrast     Narrative     EXAMINATION:  CT HEAD WITHOUT CONTRAST     CLINICAL HISTORY:  Stroke, follow up;     TECHNIQUE:  Axial CT images obtained throughout the head without intravenous contrast.     COMPARISON:  01/11/2023     FINDINGS:  Negative for acute hemorrhage, mass effect, extraaxial collection, hydrocephalus.     There is good gray white matter differentiation.     Stable moderate to marked chronic small vessel ischemic changes throughout the white matter.  Calcified plaque skull-base vasculature.     The calvarium is unremarkable with no fractures.        Impression     Negative for acute intracranial abnormality.     All CT scans at this facility are performed  using dose modulation techniques as appropriate to  performed exam including the following:  automated exposure control; adjustment of mA and/or kV according to the patients size (this includes techniques or standardized protocols for targeted exams where dose is matched to indication/reason for exam: i.e. extremities or head);  iterative reconstruction technique.        Electronically signed by:Teo Turner MD  Date:                                        03/07/2023  Time:                                       07:22      Current medications: Mr. Ferrari has a current medication list which includes the following prescription(s): amlodipine, aspirin, atorvastatin, clopidogrel, donepezil, memantine, multivitamin, and [DISCONTINUED] hydrochlorothiazide.     Review of patient's allergies indicates:   Allergen Reactions    Lisinopril Swelling       PSYCHIATRIC HISTORY: Denied     SUICIDAL IDEATION:  History: Denied  Current Stressors: Cognitive changes  Active Suicidal Ideation:Denied  Plan/Intent: Denied     FAMILY HISTORY: family history includes Aneurysm in his father; Cerebral aneurysm in his father; No Known Problems in his mother; Prostate cancer in his brother and son.     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               Retired: Approximately 10 years ago     : He served in the [employer] after high school, for 8 years, as an .      Relationship Status/ Support Network:              : Yes for nearly 61 years.              : No              Children: 3, 6 grandchildren, and 4 great-grandchildren.      SUBSTANCE USE: Mr. Ferrari denied a  history of problematic alcohol or recreational substance use and is a lifelong non-smoker.      MENTAL STATUS AND OBSERVATIONS (Interview - 05/21/2024):  APPEARANCE: Casually dressed and adequate grooming/hygiene.   ALERTNESS/ORIENTATION: Attentive and alert. Mr. Ferrari was oriented to person; loosely oriented to time, correctly generating the year and identifying the month and weekday with multiple choice cues; karmon oriented to place with multiple choice cues; disoriented to circumstances; and loosely oriented to current events also with cueing.   GAIT/MOTOR: He sat and arose from a chair independently although was unsteady while doing so. He demonstrated difficulties with dysdiadochokinesia, and bilateral, left hand worse than right hand ideomotor praxis. Finger tapping was bilaterally slowed and diminished in amplitude, worse with his right than left hand.  SENSORY: Speech was sparse, soft in volume, and mildly dysarthric. He had difficulties with speech praxis and difficulties with repetition on examination. Expressive language was notable for frequent word-finding errors for nouns in casual speech; and word-finding difficulty when asked to identify objects in the office.   MOOD/AFFECT: Mood was generally upbeat and affect was mood-congruent.   INTERPERSONAL BEHAVIOR: Rapport was quickly and easily established. Mr. Ferrari remained polite and pleasant.  THOUGHT PROCESSES: Thoughts seemed logical and goal-directed within short durations. He quickly forgot information. On informal assessment in the office, he recalled 0/3 objects presented after an approximate 1-minute delay with some benefit from recognition cueing.    TESTING OBSERVATIONS 06/11/2024: During testing, Mr. Ferrari did all that was asked of him without complaint or criticism. He had euthymic mood with broad affect. Although difficulties were apparent, he did not appear to perceive that he was having difficulties with testing. He at times engaged  in echopraxia. He often lost cognitive set, requiring reminders of task instructions and reminders to continue to work on tests. He had difficulty inhibiting his inclination to read test stimuli aloud, reading aloud nearly all items placed in front of him.     RESULTS     Tests Administered (manual norms used unless otherwise indicated): [place] in the Hand test; [employer] Test of Premorbid Functioning (TOPF); [place] Naming Test (15-item [place] Norms); Trail Making Test (TMT A/ B, [facility] norms); Repeatable Battery for the Assessment of Neuropsychological Status (RBANS, form A); Dementia Rating Scale, 2nd Ed. (DRS-2); Activities of Daily Living Questionnaire (ADLQ, caregiver form); Neuropsychiatric Inventory (NPI-Q); Geriatric Depression Scale (GDS); [place] Anxiety Inventory (SUSIE).     Score Label T-Score Standard Score Z-Score Scaled Score %ile Rank   Exceptionally High > 70 > 130 > 2.0  > 16 > 98   Above Average 64-69 120-129 1.4-1.9 15 91-97   High Average 57-63 110-119 0.7-1.3 12-14 75-90   Average 44-56  0.6 to -0.6 8-11 25-74   Low Average 37-43 80-89 -1.3 to -0.7 6-7 9-24   Below Average 30-36 70-79 -2.0 to -1.4 4-5 2-8   Exceptionally Low < 30 < 70  < -2.0 < 4 < 2     Performance Validity: Mr. Ferrari completed both stand-alone and embedded measures of task engagement. Below-cutoff performance on any one stand-alone measure and/ or any two embedded measures of task engagement is highly unlikely to occur outside the context of poor or inconsistent effort during testing. Mr. Ferrari scored above predetermined cutoffs on all administered measures of task engagement. As such, there is no evidence to suggest poor or inconsistent engagement and their performance is deemed to be a reasonable reflection of their day-to-day cognitive status.       PREMORBID FUNCTIONING Raw Score Type of Standardized Score Standardized Score Percentile/CP Descriptor   TOPF simple dem. eFSIQ -  73 Average    TOPF pred. eFSIQ - SS 86 18 Low Average   TOPF simple + pred. eFSIQ - SS 98 45 Average   COGNITIVE SCREENING Raw Score Type of Standardized Score Standardized Score Percentile/CP Descriptor   RBANS         Immediate Memory - SS 53 0.1 Exceptionally Low    VS/Construction -  73 Average   Language - SS 60 0.4 Exceptionally Low    Attention - SS 79 8 Below Average   Delayed Memory - SS 40 0.1 Exceptionally Low    Total Scale - SS 60 0.4 Exceptionally Low    DRS-2         Attention 35 ss 10 50 Average   Initiation/Perseveration 19 ss 2 0.4 Exceptionally Low    Construction 5 ss 7 16 Low Average   Conceptualization 33 ss 8 25 Average   Memory 9 ss 2 0.4 Exceptionally Low    Total Scale 101 ss 2 0.4 Exceptionally Low    LANGUAGE FUNCTIONING Raw Score Type of Standardized Score Standardized Score Percentile/CP Descriptor   RBANS Naming 8 ss - 3-9 Below Average   RBANS Semantic Fluency 6 ss 2 0.4 Exceptionally Low    TOPF Word Reading 20 SS 84 14 Low Average   NAB Auditory Comprehension 81 Tscore 30 2 Below Average   NAB Auditory Comprehension Colors 13 - - 100 WNL   NAB Auditory Comprehension Shapes 22 - - 100 WNL   NAB Auditory Comprehension Colors/Shapes/Numbers 19 - - 5 Below Average   NAB Auditory Comprehension Pointing 6 - - 100 WNL   NAB Auditory Comprehension Yes/No 6 - - 3 Below Average   NAB Auditory Comprehension Paper Folding 15 - - 39 Average   BNT-15 6 zscore -3.33 <0.1 Exceptionally Low   FAS 25 Tscore 37 9 Low Average   Animal Naming 4 Tscore 14 <0.1 Exceptionally Low   VISUOSPATIAL FUNCTIONING Raw Score Type of Standardized Score Standardized Score Percentile/CP Descriptor   RBANS Line Orientation  17 ss - 51-75 Average   RBANS Figure Copy 18 ss 11 63 Average   LEARNING & MEMORY Raw Score Type of Standardized Score Standardized Score Percentile/CP Descriptor   RBANS         Immediate Memory - SS 53 0.1 Exceptionally Low    Delayed Memory - SS 40 0.1 Exceptionally Low    List Learning 9 ss 2 0  Exceptionally Low    List Recall 0 ss - 3-9 Below Average   List Recognition 11 ss - <2 Exceptionally Low   Story Memory 5 ss 3 1 Exceptionally Low    Story Recall 0 ss 2 0.4 Exceptionally Low    Figure Recall 0 ss 1 0.1 Exceptionally Low    ATTENTION/WORKING MEMORY Raw Score Type of Standardized Score Standardized Score Percentile/CP Descriptor   RBANS Digit Span  6 ss 5 5 Below Average   MENTAL PROCESSING SPEED Raw Score Type of Standardized Score Standardized Score Percentile/CP Descriptor   RBANS Coding 29 ss 8 25 Average   TMT A  110 Tscore 20 0 Exceptionally Low    TMT A errors 4 - - - -   EXECUTIVE FUNCTIONING Raw Score Type of Standardized Score Standardized Score Percentile/CP Descriptor   TMT B 301 Tscore 22 <1 Exceptionally Low    TMT B errors 2 - - - -   FUNCTIONAL  Raw Score Type of Standardized Score Standardized Score Percentile/CP Descriptor   ADLQ Self Care Activities - Percent - 78 Severe Impairment   ADLQ Household Care - Percent - 72 Severe Impairment   ADLQ Employment & Recreation - Percent - 78 Severe Impairment   ADLQ Shopping & Money - Percent - 100 Severe Impairment   ADLQ Travel - Percent - 89 Severe Impairment   ADLQ Communication - Percent - 67 Moderate Impairment   MOOD & PERSONALITY Raw Score Type of Standardized Score Standardized Score Percentile/CP Descriptor   GDS-30 1 - - - WNL   SUSIE 1 - - - Minimal   ss = scaled score (mean = 10, SD = 3); SS = standard score (mean = 100, SD = 15); Tscore mean = 50, SD = 10; zscore (mean = 0.00, SD = 1)       Billing Documentation     Time spent conducting face to face interview with the patient: 60 minutes; billed separately.  Time on review of neuropsychological test data and relevant records, data interpretation, providing feedback about these results, and writing/ documentation: 151 minutes; [id number] &[id number] (x2).  Psychometrist time spent in the administration and scoring of 2 or more neuropsychological tests 220 minutes; [id number] [phone] [id number] (x6).      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

## 2024-06-21 ENCOUNTER — OFFICE VISIT (OUTPATIENT)
Dept: NEUROLOGY | Facility: CLINIC | Age: 84
End: 2024-06-21
Payer: MEDICARE

## 2024-06-21 VITALS
RESPIRATION RATE: 16 BRPM | WEIGHT: 163.81 LBS | DIASTOLIC BLOOD PRESSURE: 73 MMHG | HEIGHT: 70 IN | HEART RATE: 71 BPM | SYSTOLIC BLOOD PRESSURE: 154 MMHG | BODY MASS INDEX: 23.45 KG/M2

## 2024-06-21 DIAGNOSIS — I10 ESSENTIAL HYPERTENSION: ICD-10-CM

## 2024-06-21 DIAGNOSIS — T78.3XXA ANGIOEDEMA DUE TO ANGIOTENSIN CONVERTING ENZYME INHIBITOR (ACE-I): ICD-10-CM

## 2024-06-21 DIAGNOSIS — F02.B18 MODERATE MAJOR NEUROCOGNITIVE DISORDER DUE TO ALZHEIMER'S DISEASE WITH BEHAVIORAL DISTURBANCE: Primary | ICD-10-CM

## 2024-06-21 DIAGNOSIS — F03.A0 MILD DEMENTIA WITHOUT BEHAVIORAL DISTURBANCE, PSYCHOTIC DISTURBANCE, MOOD DISTURBANCE, OR ANXIETY, UNSPECIFIED DEMENTIA TYPE: ICD-10-CM

## 2024-06-21 DIAGNOSIS — R41.3 MEMORY DIFFICULTIES: ICD-10-CM

## 2024-06-21 DIAGNOSIS — Z85.46 HISTORY OF PROSTATE CANCER: ICD-10-CM

## 2024-06-21 DIAGNOSIS — G30.9 MODERATE MAJOR NEUROCOGNITIVE DISORDER DUE TO ALZHEIMER'S DISEASE WITH BEHAVIORAL DISTURBANCE: Primary | ICD-10-CM

## 2024-06-21 DIAGNOSIS — T46.4X5A ANGIOEDEMA DUE TO ANGIOTENSIN CONVERTING ENZYME INHIBITOR (ACE-I): ICD-10-CM

## 2024-06-21 DIAGNOSIS — I70.0 AORTIC ATHEROSCLEROSIS: Chronic | ICD-10-CM

## 2024-06-21 DIAGNOSIS — R13.10 DYSPHAGIA, UNSPECIFIED TYPE: ICD-10-CM

## 2024-06-21 DIAGNOSIS — R41.3 OTHER AMNESIA: ICD-10-CM

## 2024-06-21 DIAGNOSIS — I67.2 CEREBRAL ATHEROSCLEROSIS: ICD-10-CM

## 2024-06-21 DIAGNOSIS — Z86.73 HISTORY OF CVA (CEREBROVASCULAR ACCIDENT) WITHOUT RESIDUAL DEFICITS: ICD-10-CM

## 2024-06-21 PROCEDURE — 99999 PR PBB SHADOW E&M-EST. PATIENT-LVL IV: CPT | Mod: PBBFAC,,, | Performed by: NURSE PRACTITIONER

## 2024-06-21 NOTE — PROGRESS NOTES
Subjective:       Patient ID: Pravin Ferrari is a 83 y.o. male.    Chief Complaint: Moderate major neurocognitive disorder due to Alzheimer's d, Update Plan Of Care, and Results          HPIThe patient is here to establish care for  memory loss.      The patient is presenting with memory loss that began approximately in 2019. The patient is accompanied by spouse and sister. The main problems the patient has are related to progressive short term memory loss. In 2022 the patient memory worsened and the patient was evaluated at [facility] and diagnosed with dementia. Wife gave many examples, the patient would repeat the same question repeatedly. The patient sometimes look at pictures of  grand kids and unable to recall who they are or unable to recall their names. The patient is no longer driving stopped driving in 2020, he began to get lost.  The patient is not losing personal items and does not put them in odd places. No confusion around and inside the house. Patient has trouble remembering the date and the day weeks. Patient spouse manages medications and appointments Patient able to recall major holidays. Patient is unaware of political changes. Patient wife handling finances. The patient is still independent with ADLs, able to give his own bath, dress hisself, wife assist with clothes selection. Has some mild agitation. No hallucinations or delusions. No seizures. No problems handling tools. History of multiple strokes MRI Brain WO 01- Left basal ganglia Small focus of acute or subacute ischemia left basal ganglia/posterior limb internal capsule. Patient had speech changes, slurred speech, Right sided weakness and numbness deficit.  Stroke risk factor HTN, HLD, Takes ASA 81 mg po daily and Plavix 75 mg po daily.   No history of headaches. No history of hypothyroidism. No history of alcoholism.  No history of B12 deficiency. No history of depression. No history of bipolar disorder. No history of Untreated Syphilis.   No history of HIV infection. No toxic exposures.  No history of traumatic brain injury. Mild bilateral tremors. No falls, unsteady wide based gait. . No urinary incontinence. No change in sleep and appetite good. No family history of dementia. Takes Aricept 10 mg po Night and Namenda 10 mg BID tolerating well, no side effects.         INTERVAL HISTORY 06-:Patient present for follow up for memory management. Patient accompanied by spouse and granddaughter. Patient doing well. Tolerating Aricept 10 mg po night and Namenda 10 mg BID tolerating well, no side effects. No significant cognitive changes and significant behavior changes noted.Patient occasional has agitation but he is able to to be redirected. Discussed plan of care.     Discussed Dr. Richards CNP results 06- CNP RESULTS Moderate major neurocognitive disorder due to multiple etiologies with behavioral disturbanceS, Alzheimer's disease, Cerebrovascular disease, HISTORY OF CVA with residual deficits, Cerebral atherosclerosis            Review of Systems   Unable to perform ROS: Dementia   Constitutional: Negative.    HENT:  Positive for trouble swallowing.    Eyes: Negative.    Respiratory: Negative.     Gastrointestinal: Negative.    Endocrine: Negative.    Genitourinary: Negative.    Musculoskeletal:  Positive for arthralgias and gait problem.   Skin: Negative.    Allergic/Immunologic: Negative.    Neurological:  Positive for numbness.   Hematological: Negative.    Psychiatric/Behavioral:  Positive for agitation, confusion, decreased concentration and dysphoric mood. Negative for behavioral problems, hallucinations, self-injury, sleep disturbance and suicidal ideas. The patient is not nervous/anxious and is not hyperactive.                  Current Outpatient Medications:     amLODIPine (NORVASC) 5 MG tablet, TAKE 1 TABLET(5 MG) BY MOUTH EVERY DAY, Disp: 90 tablet, Rfl: 3    aspirin (ECOTRIN) 81 MG EC tablet, Take 81 mg by mouth once daily., Disp:  , Rfl:     clopidogreL (PLAVIX) 75 mg tablet, TAKE 1 TABLET(75 MG) BY MOUTH EVERY DAY, Disp: 90 tablet, Rfl: 1    donepeziL (ARICEPT) 10 MG tablet, Take 1 tablet (10 mg total) by mouth nightly., Disp: 90 tablet, Rfl: 3    memantine (NAMENDA) 10 MG Tab, Take 1 tablet (10 mg total) by mouth 2 (two) times a day., Disp: 180 tablet, Rfl: 3    multivitamin (THERAGRAN) per tablet, Take 1 tablet by mouth once daily., Disp: , Rfl:     atorvastatin (LIPITOR) 40 MG tablet, Take 1 tablet (40 mg total) by mouth once daily., Disp: 90 tablet, Rfl: 3  Past Medical History:   Diagnosis Date    Acute CVA (cerebrovascular accident) 1/11/2023    Cancer     prostate    [place] (hard of hearing)     bilateral ears    Hypertension     pt states no     Past Surgical History:   Procedure Laterality Date    COLONOSCOPY N/A 3/19/2019    Procedure: COLONOSCOPY;  Surgeon: Korin Cavazos MD;  Location: [facility];  Service: Endoscopy;  Laterality: N/A;    EYE SURGERY      HERNIA REPAIR      PROSTATE SURGERY       Social History     Socioeconomic History    Marital status:    Tobacco Use    Smoking status: Never    Smokeless tobacco: Never   Substance and Sexual Activity    Alcohol use: Not Currently    Drug use: No    Sexual activity: Not Currently             Past/Current Medical/Surgical History, Past/Current Social History, Past/Current Family History and Past/Current Medications were reviewed in detail.        Objective:           VITAL SIGNS WERE REVIEWED      GENERAL APPEARANCE:     The patient looks comfortable.    BMI 22.10 KG     No signs of respiratory distress.    Normal breathing pattern.    No dysmorphic features    Normal eye contact.     GENERAL MEDICAL EXAM:    HEENT:  Head is atraumatic normocephalic.     No tender temporal arteries. Fundoscopic (Ophthalmoscopic) exam showed no disc edema.      Neck and Axillae: No JVD. No visible lesions.    No carotid bruits. No thyromegaly. No lymphadenopathy.    Cardiopulmonary: No  cyanosis. No tachypnea. Normal respiratory effort.    Gastrointestinal/Urogenital:  No jaundice. No stomas or lesions. No visible hernias. No catheters.     Abdomen is soft non-tender. No masses or organomegaly.    Skin, Hair and Nails: No pathognonomic skin rash. No neurofibromatosis. No visible lesions.No stigmata of autoimmune disease. No clubbing.    Skin is warm and moist. No palpable masses.    Limbs: No varicose veins. No visible swelling.    No palpable edema. Pulses are symmetric. Pedal pulses are palpable.      Muskoskeletal: No visible deformities.No visible lesions.    No spine tenderness. No signs of longstanding neuropathy. No dislocations or fractures.            Neurologic Exam     Mental Status   Oriented to person, place, and time.   Follows 3 step commands.   Attention: decreased. Concentration: decreased.   Speech: speech is normal and not slurred   Level of consciousness: alert  Knowledge: consistent with education and poor. Able to perform simple calculations.   Able to name object. Able to read. Able to repeat. Able to write. Abnormal comprehension.     Cranial Nerves     CN II   Visual fields full to confrontation.   Visual acuity: normal  Right visual field deficit: none  Left visual field deficit: none     CN III, IV, VI   Pupils are equal, round, and reactive to light.  Extraocular motions are normal.   Right pupil: Size: 2 mm. Shape: regular. Reactivity: brisk. Consensual response: intact. Accommodation: intact.   Left pupil: Size: 2 mm. Shape: regular. Reactivity: brisk. Consensual response: intact. Accommodation: intact.   CN III: no CN III palsy  CN VI: no CN VI palsy  Nystagmus: none   Diplopia: none  Ophthalmoparesis: none  Upgaze: normal  Downgaze: normal  Conjugate gaze: present  Vestibulo-ocular reflex: present    CN V   Facial sensation intact.   Right facial sensation deficit: none  Left facial sensation deficit: none    CN VII   Left facial weakness: none  Left taste:  normal    CN VIII   CN VIII normal.   Hearing: intact  Right Rinne: AC > BC  Left Rinne: AC > BC    CN IX, X   CN IX normal.   CN X normal.   Palate: symmetric    CN XI   CN XI normal.   Right sternocleidomastoid strength: normal  Left sternocleidomastoid strength: normal  Right trapezius strength: normal  Left trapezius strength: normal    CN XII   CN XII normal.   Tongue: not atrophic  Fasciculations: absent  Tongue deviation: none    Motor Exam   Muscle bulk: normal  Overall muscle tone: normal  Right arm tone: normal  Left arm tone: normal  Right arm pronator drift: absent  Left arm pronator drift: absent  Right leg tone: normal  Left leg tone: normal    Strength   Strength 5/5 throughout.   Right neck flexion: 5/5  Left neck flexion: 5/5  Right neck extension: 5/5  Left neck extension: 5/5  Right deltoid: 5/5  Left deltoid: 5/5  Right biceps: 5/5  Left biceps: 5/5  Right triceps: 5/5  Left triceps: 5/5  Right wrist flexion: 5/5  Left wrist flexion: 5/5  Right wrist extension: 5/5  Left wrist extension: 5/5  Right interossei: 5/5  Left interossei: 5/5  Right iliopsoas: 5/5  Left iliopsoas: 5/5  Right quadriceps: 5/5  Left quadriceps: 5/5  Right hamstrin/5  Left hamstrin/5  Right glutei: 5/5  Left glutei: 5/5  Right anterior tibial: 5/5  Left anterior tibial: 5/5  Right posterior tibial: 5/5  Left posterior tibial: 5/5  Right peroneal: 5/5  Left peroneal: 5/5  Right gastroc: 5/5  Left gastroc: 5/5    Sensory Exam   Light touch normal.   Right arm light touch: normal  Left arm light touch: normal  Right leg light touch: normal  Left leg light touch: normal  Vibration normal.   Right arm vibration: normal  Left arm vibration: normal  Right leg vibration: normal  Left leg vibration: normal  Proprioception normal.   Right arm proprioception: normal  Left arm proprioception: normal  Right leg proprioception: normal  Left leg proprioception: normal  Right arm pinprick: normal  Left arm pinprick: normal  Right  leg pinprick: decreased from toes  Left leg pinprick: decreased from toes  Sensory deficit distribution on left: lateral cutaneous thigh  Graphesthesia: normal  Stereognosis: normal    Gait, Coordination, and Reflexes     Gait  Gait: wide-based    Coordination   Romberg: negative  Finger to nose coordination: normal  Heel to shin coordination: normal  Tandem walking coordination: normal    Tremor   Resting tremor: absent  Intention tremor: absent  Action tremor: right arm    Reflexes   Right brachioradialis: 2+  Left brachioradialis: 2+  Right biceps: 2+  Left biceps: 2+  Right triceps: 2+  Left triceps: 2+  Right patellar: 2+  Left patellar: 2+  Right achilles: 0  Left achilles: 0  Right plantar: normal  Left plantar: normal  Right Pulido: absent  Left Pulido: absent  Right ankle clonus: absent  Left ankle clonus: absent  Right pendular knee jerk: absent  Left pendular knee jerk: absent      Mild postural tremors    Poor posture            FRIDA COGNITIVE ASSESSMENT (MOCA) TOTAL SCORE       MODERATE DEMENTIA 10-19    SEVERE DEMENTIA <10        DATE 01-       TOTAL SCORE 18/30       VISUOSPATIAL EXECUTIVE (5) 3       NAMING (3) 3       ATTENTION (6) 6       LANGUAGE (3) 3       ABSTRACTION(2) 1       RECALL (5) 0       ORIENTATION (6) 2           Lab Results   Component Value Date    WBC 7.38 10/24/2023    HGB 13.6 (L) 10/24/2023    HCT 43.1 10/24/2023    MCV 95 10/24/2023     10/24/2023     Sodium   Date Value Ref Range Status   10/24/2023 140 136 - 145 mmol/L Final     Potassium   Date Value Ref Range Status   10/24/2023 4.4 3.5 - 5.1 mmol/L Final     Chloride   Date Value Ref Range Status   10/24/2023 104 95 - 110 mmol/L Final     CO2   Date Value Ref Range Status   10/24/2023 28 23 - 29 mmol/L Final     Glucose   Date Value Ref Range Status   10/24/2023 73 70 - 110 mg/dL Final     BUN   Date Value Ref Range Status   10/24/2023 19 8 - 23 mg/dL Final     Creatinine   Date Value Ref Range Status    10/24/2023 1.1 0.5 - 1.4 mg/dL Final     Calcium   Date Value Ref Range Status   10/24/2023 9.5 8.7 - 10.5 mg/dL Final     Total Protein   Date Value Ref Range Status   10/24/2023 6.8 6.0 - 8.4 g/dL Final     Albumin   Date Value Ref Range Status   10/24/2023 3.6 3.5 - 5.2 g/dL Final     Total Bilirubin   Date Value Ref Range Status   10/24/2023 0.5 0.1 - 1.0 mg/dL Final     Comment:     For infants and newborns, interpretation of results should be based  on gestational age, weight and in agreement with clinical  observations.    Premature Infant recommended reference ranges:  Up to 24 hours.............<8.0 mg/dL  Up to 48 hours............<12.0 mg/dL  3-5 days..................<15.0 mg/dL  6-29 days.................<15.0 mg/dL       Alkaline Phosphatase   Date Value Ref Range Status   10/24/2023 71 55 - 135 U/L Final     AST   Date Value Ref Range Status   10/24/2023 40 10 - 40 U/L Final     ALT   Date Value Ref Range Status   10/24/2023 60 (H) 10 - 44 U/L Final     Anion Gap   Date Value Ref Range Status   10/24/2023 8 8 - 16 mmol/L Final     eGFR if    Date Value Ref Range Status   01/12/2022 >60 >60 mL/min/1.73 m^2 Final     eGFR if non    Date Value Ref Range Status   01/12/2022 >60 >60 mL/min/1.73 m^2 Final     Comment:     Calculation used to obtain the estimated glomerular filtration  rate (eGFR) is the CKD-EPI equation.        Lab Results   Component Value Date    [id number] 360 12/06/2021     Lab Results   Component Value Date    TSH 1.928 10/24/2023       EKG     03-    QT interval 410 HR 61         RADIOLOGY EVALUATION:    MRI BRAIN WO:    02-    No acute abnormality.  Moderately advanced atrophy and white matter degeneration.     Chronic left basal ganglia lacunar infarct with associated white matter degeneration extending into the left cerebral peduncle.     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      MRI showed multiple old  strokes and moderate atrophy could been seen in patients with Vascular dementia . No acute findings  01-    MRI Brain WO     Small focus of acute or subacute ischemia left basal ganglia/posterior limb internal capsule.         02-    MRI Brain WO     Negative MRI Brain. Findings felt consistent with marked atrophy, microvascular change and previouse small vessel insults.     06-    CNP RESULTS      Moderate major neurocognitive disorder due to multiple etiologies with behavioral disturbance   Alzheimer's disease  Cerebrovascular disease    HISTORY OF CVA with residual deficits   Cerebral atherosclerosis    No results found in the last 24 hours.      Reviewed the neuroimaging independently       Assessment:       1. 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 Other amnesia    3. History of CVA (cerebrovascular accident) without residual deficits    4. Mild dementia without behavioral disturbance, psychotic disturbance, mood disturbance, or anxiety, unspecified dementia type    5. Dysphagia, unspecified type    6. Cerebral atherosclerosis    7. Essential hypertension    8. History of prostate cancer    9. Memory difficulties    10. Angioedema due to angiotensin converting enzyme inhibitor (ACE-I)    11. Aortic atherosclerosis          Plan:         MODERATE MAJOR NEUROCOGNITIVE DISORDER DUE TO VASCULAR DEMENTIA VS ALZHEIMER'S DISEASE WITH BEHAVIORAL DISTURBANCE/ FORGETFULNESS/ AGITATION/ HX OF STROKE/ SWALLOWING DIFFICULTY         EVALUATION       Comprehensive Neuropsychological Testing     No longer  driving.       DISEASE-MODIFYING AGENTS     Explained to the patient and family that medications are intended to slow down the progression (in the early stages of the disease) and not stop it or reverse the disease. The disease is relentless, progressive and so far, cannot be controlled. Progression includes Cognitive decline, Behavioral  disturbances, and Motor decline as well.     I counseled the patient and family that the rate of progression is extremely variable, and the average (10 years) is an inaccurate measure.     CLASSES:    NMDA RECEPTOR ANTAGONISTS    Continue Namenda 10 mg BID.       CHOLINESTERASE INHIBITORS (CEI)    Continue Aricept 10 mg po HS     ANTI-AMYLOID MONOCLONAL ANTIBODIES (SPECIFICALLY FOR ALZHEIMER-TYPE DEMENTIA)      Aducanumab (Aduhelm), Lecanemab (Leqembi) and Donanemab. The actual cognitive benefits remain unsubstantiated and the risks (including death) may outweigh the benefit.The method of administration and cost remain prohibitive as well.  Will consider such options after high quality studies and post marketing experiencing demonstrate significant clinical efficacy and safety.         MISCELLANEOUS     [facility] study regarding Sildenafil (Viagra) impact on dementia showed possible benefit. Will wait for high quality prospective double-blinded placebo-controlled trials to make any proper recommendations.     Recommended against the use of OTC non-FDA evaluated supplements and claims.         SYMPTOMATIC MANAGEMENT-BEHAVIORAL SYMPTOMS AND NON-COGNITIVE SYMPTOMS       ANTIDEPRESSANTS CLASS MEDICATIONS    FUTURE CONSIDERATION:    Trazodone 50 mg QHS to help with insomnia. Instructed the family to watch for excessive sedation or paradoxical agitation. Other options includes Mirtazapine (Remeron).    Sertraline (Zoloft) titration to 100 mg in the morning can mitigate anxiety symptoms.     Bupropion (Wellbutrin) small dose titration  mg in the morning helps with attention and concentration in addition to depression and cravings.    ANTIEPILEPTIC CLASS MEDICATIONS     Lamotrigine (Lamictal) LTG 25 mg BID to help for agitation and mood stabilization and antiepileptic effect. Other options include: Valproic Acid (Depakote) VPA.      ANTIPSYCHOTIC AND NEUROLEPTIC CLASS MEDICATIONS     Risperdal 1 mg one hour  before sunset to assist with sundowning , psychotic symptoms and behavioral disturbances. Other options include: Aripiprazole (Abilify),  Quetiapine (Seroquel) and  Brexipiprazole (Rixulti).          HOME CARE     Consider Home Palliative care in future.     Falling Down Precautions. Gait is affected by the disease as well.     Avoid driving and access to firearms     Help with finances and decision making.    Join support group.    Proofing the house and use labeling.    Avoid antihistamines and anticholinergics.    Avoid changing routine.    Use written reminders.    Avoid multitasking.    Healthy diet, exercise (physical and cognitive).    Good sleep hygiene.        HERE ARE 10 WAYS TO REDUCE RISK OF DEMENTIA AND SLOW DOWN THE PROGRESSION OF DEMENTIA        1.Be physically active.    2. Avoid smoking and alcohol consumption.    3. Track your numbers. Keep your blood pressure, cholesterol, blood sugar and weight within recommended ranges.    4. Stay socially connected.    5. Make healthy food choices. Eat a well-balance and healthy diet that rich in cereals, fish, legumes, and vegetables.    6. Reduce stress.     7. Challenge your brain by trying something new, playing games, or learning a new language.    8. Take care of your hearing. Avoid being continuously exposed to loud sounds and wear a hearing aid if hearing does become a problem.    9. Lower risk of falls. Consider installing handrails on all stairs and grab bars in bathrooms.    10. Reduce your exposure to air pollution, such as exposure to exhaust in heavy traffic.         CAREGIVERS COUNSELING     Recommend reading the following books:     The 36-Hour Day and there are many online and printed resources to help caregivers as well.     Alzheimer's Through the Stages.     Dementia with G.R.A.C.E.            PREVENTION OF DELIRIUM       1. Good hydration and avoid electrolyte imbalance  2. Recognize and treat infections immediately especially UTI.  3.  "Bladder emptying and prevent constipation.   4. Provide stimulating activities and familiar objects  5. Use eyeglasses and hearing aids if needed.   6. Use simple and regular communication about people, current place, and time  7. Mobility and range-of-motion exercises  8. Reduce noise, lighting and avoid sleep interruptions  9. Non-narcotic pain management.  10.Nondrug treatment for sleep problems or anxiety  11. Avoid antihistamines.  12. Avoid narcotics.  13. Avoid benzodiazepines.            HELPFUL STRATEGIES FOR THE FAMILY AND CAREGIVERS        BEHAVIORAL MANAGEMENT STRATEGIES     Remember that you cannot reason with acute psychosis or confusion. Unless there is an immediate safety issue, there is no need to challenge or correct the person.  For example, if a pt is hallucinating, do not argue with the person that what they are seeing is not real. If they are expressing paranoia, empathize gently with their fear, and attempt to redirect them to a different activity.   If the person is particularly stuck on a topic or activity, as long as the activity is safe and is not worsening their agitation, you don't need to intervene.     Communicate calmly, simply, and concisely    Reassure the person that they are safe and you are here to help  Try not to express irritation or anger  Speak quietly and calmly, do not shout or threaten the person. Avoid provocation.   Establish verbal contact and provide orientation and reassurance.  Attempt to identify the patient's wants and feelings, listen to what they are saying, and reflect those wants and feelings back to the patient.  Dont use sarcasm as a weapon    Set clear limits on behavior in a calm voice ("You cannot hit the nurse.")  Offer choices and optimism ("Which toothpaste would you like to use today?")    Redirect the person to an alternative behavior ("Can you come help me with this?)    Avoid saying things like, "We already went over this!" "You can't keep doing " "this." "I already explained this to you"  Instead, simply nod calmly and acknowledge what the person is saying ("Yes, that makes sense."), and then request their help or company in a different behavior.   Having a mental list of activities the patient enjoys can be helpful with redirection. For example, do they enjoy going for walks? Eating a piece of candy?   If redirection becomes challenging, you can place objects that your family member enjoys strategically in the home in order to use for distraction. This is particularly useful if there are items that they often talk about or frequently ask you questions about; or if they are visually striking. Once you focus the person on this object, talk about it briefly until they are calm and then attempt to redirect to a new behavior.   Sometimes activities which are repetitive can be calming, particularly if there is a comforting sensory element. For example, pt may enjoy folding soft towels or laundry.    Limit overstimulation    Decrease distractions by turning off the TV, computer, any fluorescent lights that hum, etc.  Ask any casual visitors to leave--the fewer people the better  If the person is very agitated, avoid touching them, and respect their personal space  Sit down and ask the person to sit down also     PREVENTATIVE STRATEGIES     Try to help the patient develop a routine and stick to it  Address all immediate safety issues  Does the person still have access to the car and keys? Take the keys away, or disconnect the car battery.  Are they wandering at night? Install locks on the outside doors. A keypad lock works well. Alarms on bedroom doors can also be helpful.   Are they turning on the stove and risking a fire? If you cannot limit their access to the kitchen, you may need to unplug dangerous devices any time you are not in the room.   If the person is exhibiting poor judgment throughout the day, they likely need someone supervising them at all times. " "  Do they still have access to significant financial assets? Limit their access to accounts, and consult with a  if necessary.   Identify situations that seem to trigger agitation or a problematic behavior, and modify the person's environment to minimize or eliminate that trigger.   For example, is their behavior worse if they don't get a good night's sleep? Or if they don't eat or drink enough? If so, prioritize those activities and build behavior plans to support them.  Do they get upset about not being allowed to answer the phone when it rings? Put all of the phones in the house on "silent."   Do they become paranoid that certain family members are trying to take advantage of them? Limit contact with the targeted family member as much as possible, and re-introduce them slowly after some time has passed.   Encourage independence in daily living whenever safely possible  Encourage collaborative decision-making regarding his care as well as daily activities  Encourage regular physical exercise  Address issues that may be impacting sleep   Ex: Urology consult for frequent nighttime urination, address chronic pain that may be interfering with sleep, moving to a different room if his roommate snores loudly, make sure the room is a comfortable temperature and he has adequate pillows and blankets  Ensure he gets out into the sunlight at least once per day to encourage regular circadian cycle  No caffeine, sugar, or large meals within two hours of bedtime   Make sure room at night is dark and quiet and minimize nighttime interruptions from staff unless medically necessary   Try to help pt go to sleep and wake up at the same time every day  Monitor closely for worsening psychiatric symptoms (insomnia, social withdrawal, deterioration of personal hygiene, hostility, confusing or nonsensical speech, paranoia, hallucinations) and intervene promptly. Assess for possible antecedents, modify environment appropriately.      "       SLEEP HYGIENE     Poor sleep has a negative effect on cognition. Several strategies have been shown to improve sleep:     Caffeine intake in the afternoon and evening, as well as stuffing oneself at supper, can decrease the quality of restful sleep throughout the night.   Bedtime and wake-up times should be consistent every night and morning so the body becomes used to a single routine, even on the weekends.  Engage in daily physical activity, but not 2-3 hours before bedtime.   No technology use (television, computer, iPad) 1-2 hours before bed.   Have a wind down routine (e.g., soft lights in the house, bath before bed, reduced fluid intake, songs, reading, less noise) to promote sleep readiness.   Visit the www.sleepfoundation.org for more strategies.      COGNITIVE HYGIENE     Engage in regular exercise, which increases alertness and arousal and can improve attention and focus.  Consider lower impact exercises, such as yoga or light walking.  Get a good nights sleep, as this can enhance alertness and cognition.  Eat healthy foods and balanced meals. It is notable that research indicates certain nutrients may aid in brain function, such as B vitamins (especially B6, B12, and folic acid), antioxidants (such as vitamins C and E, and beta carotene), and Omega-3 fatty acids. Talk with your physician or nutritionist about whats right for you.   Keep your brain active. Find activities to stay mentally active, such as reading, games (cards, checkers), puzzles (crosswords, Sudoku, jig saw), crafts (models, woodworking), gardening, or participating in activities in the community.  Stay socially engaged. Continue staying active with your family and friends.      FUTURE PLANNING     The patient and caregivers should consider formal arrangements to allow a designated person to make medical and financial decisions for the pt, should he/she become unable to do so.  Options to consider include designating a healthcare  proxy, medical and/or financial power of , and completing advanced directives for healthcare decisions and estate planning (e.g., finalizing a will).  If cost is prohibitive, [facility] Legal Services (https://Pipefish.org/) provides free  for individuals with low income.       ADDITIONAL RESOURCES     [facility] (www.http://alzbr.org) have good local caregiver and patient resources.   Consider resources for support through the Governors Office of Elderly Affairs (http://goea.[place].gov/), [place] Chapter of the Alzheimers Association (www.alz.org/[facility]/), the Family Caregiver [place] (www.caregiver.org), and the American Psychological Association (http://www.apa.org/pi/about/publications/caregivers/consumers/index.aspxconsumers/index.aspx).  For More Information About Hallucinations, Delusions, and Paranoia in Alzheimer's TAB Alzheimer's and related Dementias Education and Referral (ADEAR) Center 1-165.397.5242 (toll-free) adear@tab.nih.gov www.tab.nih.gov/alzheimers The National [place] [employer]'s ADEAR Center offers information and free print publications about Alzheimer's disease and related dementias for families, caregivers, and health professionals. ADEAR Center staff answer telephone, email, and written requests and make referrals to local and national resources            MEDICAL/SURGICAL COMORBIDITIES     All relevant medical comorbidities noted and managed by primary care physician and medical care team.          MISCELLANEOUS MEDICAL PROBLEMS       HEALTHY LIFESTYLE AND PREVENTATIVE CARE    Encouraged the patient to adhere to the age-appropriate health maintenance guidelines including screening tests and vaccinations.     Discussed the overall importance of healthy lifestyle, optimal weight, exercise, healthy diet, good sleep hygiene and avoiding drugs including smoking, alcohol and recreational drugs. The patient verbalized full  understanding.       Advised the patient to follow COVID-19 prevention measures.       I spent 30 minutes more than 50% spent face to face with the patient    time spent in counseling and coordination of care including discussions etiology of diagnosis, pathogenesis of diagnosis, prognosis of diagnosis,, diagnostic results, impression and recommendations, diagnostic studies, management, risks and benefits of treatment, instructions of disease self-management, treatment instructions, follow up requirements, patient and family counseling/involvement in care compliance with treatment regimen. All of the patient's questions were answered during this discussion.       Nirav Del Valle, MSN NP      Collaborating Provider: Omega Lemon MD, FAAN Neurologist/Epileptologist

## 2024-06-25 ENCOUNTER — OFFICE VISIT (OUTPATIENT)
Dept: NEUROLOGY | Facility: CLINIC | Age: 84
End: 2024-06-25
Payer: MEDICARE

## 2024-06-25 DIAGNOSIS — I69.30 HISTORY OF CVA WITH RESIDUAL DEFICIT: ICD-10-CM

## 2024-06-25 DIAGNOSIS — F02.B18 MODERATE MAJOR NEUROCOGNITIVE DISORDER DUE TO MULTIPLE ETIOLOGIES WITH BEHAVIORAL DISTURBANCE: Primary | ICD-10-CM

## 2024-06-25 DIAGNOSIS — I67.2 CEREBRAL ATHEROSCLEROSIS: ICD-10-CM

## 2024-06-25 PROCEDURE — 96132 NRPSYC TST EVAL PHYS/QHP 1ST: CPT | Mod: S$GLB,,, | Performed by: STUDENT IN AN ORGANIZED HEALTH CARE EDUCATION/TRAINING PROGRAM

## 2024-06-25 PROCEDURE — 99499 UNLISTED E&M SERVICE: CPT | Mod: S$GLB,,, | Performed by: STUDENT IN AN ORGANIZED HEALTH CARE EDUCATION/TRAINING PROGRAM

## 2024-06-25 PROCEDURE — 99999 PR PBB SHADOW E&M-EST. PATIENT-LVL I: CPT | Mod: PBBFAC,,, | Performed by: STUDENT IN AN ORGANIZED HEALTH CARE EDUCATION/TRAINING PROGRAM

## 2024-06-25 NOTE — PROGRESS NOTES
Mr. Frerari, his wife, and his daughter attended a in-person feedback session to discuss the results from his recent neuropsychological testing (see report dated 06/11/2024). We discussed his test results, diagnoses, and my recommendations. Mr. Ferrari and his family voiced their understanding of the information provided, and voiced their intention to follow through on the recommendations provided. All questions were answered to their satisfaction and Mr. Ferrari was provided with a copy of his report. he was encouraged to contact our office with any future questions or concerns.         Billing Documentation     Time on review of neuropsychological test data and relevant records, data interpretation, providing feedback about these results, and writing/ documentation: 64 minutes; [id number]          _____________________  Constantino Richards, Ph.D.  Neuropsychologist  Department of Neuropsychology  [facility], [place]

## 2024-07-13 DIAGNOSIS — Z86.73 HISTORY OF CVA (CEREBROVASCULAR ACCIDENT) WITHOUT RESIDUAL DEFICITS: ICD-10-CM

## 2024-07-13 DIAGNOSIS — I70.0 AORTIC ATHEROSCLEROSIS: ICD-10-CM

## 2024-07-13 DIAGNOSIS — I67.2 CEREBRAL ATHEROSCLEROSIS: ICD-10-CM

## 2024-07-13 RX ORDER — CLOPIDOGREL BISULFATE 75 MG/1
75 TABLET ORAL
Qty: 90 TABLET | Refills: 1 | Status: SHIPPED | OUTPATIENT
Start: 2024-07-13

## 2024-07-13 NOTE — TELEPHONE ENCOUNTER
Refill Decision Note   Pravin Ferrari  is requesting a refill authorization.  Brief Assessment and Rationale for Refill:  Approve     Medication Therapy Plan:         Comments:     Note composed:2:57 PM 07/13/2024

## 2024-07-13 NOTE — TELEPHONE ENCOUNTER
No care due was identified.  [facility] Embedded Care Due Messages. Reference number: [id number].   7/13/2024 8:08:56 AM CDT

## 2024-08-27 ENCOUNTER — PATIENT MESSAGE (OUTPATIENT)
Dept: INTERNAL MEDICINE | Facility: CLINIC | Age: 84
End: 2024-08-27
Payer: MEDICARE

## 2024-08-28 ENCOUNTER — OFFICE VISIT (OUTPATIENT)
Dept: INTERNAL MEDICINE | Facility: CLINIC | Age: 84
End: 2024-08-28
Payer: MEDICARE

## 2024-08-28 VITALS
HEIGHT: 70 IN | WEIGHT: 162.25 LBS | BODY MASS INDEX: 23.23 KG/M2 | DIASTOLIC BLOOD PRESSURE: 64 MMHG | SYSTOLIC BLOOD PRESSURE: 128 MMHG | HEART RATE: 86 BPM | OXYGEN SATURATION: 96 %

## 2024-08-28 DIAGNOSIS — S61.216S LACERATION OF RIGHT LITTLE FINGER WITHOUT FOREIGN BODY WITHOUT DAMAGE TO NAIL, SEQUELA: ICD-10-CM

## 2024-08-28 DIAGNOSIS — S61.411S LACERATION OF RIGHT HAND WITHOUT FOREIGN BODY, SEQUELA: ICD-10-CM

## 2024-08-28 DIAGNOSIS — L03.113 CELLULITIS OF RIGHT HAND: Primary | ICD-10-CM

## 2024-08-28 PROCEDURE — 1160F RVW MEDS BY RX/DR IN RCRD: CPT | Mod: CPTII,S$GLB,, | Performed by: FAMILY MEDICINE

## 2024-08-28 PROCEDURE — 99999 PR PBB SHADOW E&M-EST. PATIENT-LVL III: CPT | Mod: PBBFAC,,, | Performed by: FAMILY MEDICINE

## 2024-08-28 PROCEDURE — 99215 OFFICE O/P EST HI 40 MIN: CPT | Mod: 25,S$GLB,, | Performed by: FAMILY MEDICINE

## 2024-08-28 PROCEDURE — 1159F MED LIST DOCD IN RCRD: CPT | Mod: CPTII,S$GLB,, | Performed by: FAMILY MEDICINE

## 2024-08-28 PROCEDURE — 1101F PT FALLS ASSESS-DOCD LE1/YR: CPT | Mod: CPTII,S$GLB,, | Performed by: FAMILY MEDICINE

## 2024-08-28 PROCEDURE — 3074F SYST BP LT 130 MM HG: CPT | Mod: CPTII,S$GLB,, | Performed by: FAMILY MEDICINE

## 2024-08-28 PROCEDURE — 3288F FALL RISK ASSESSMENT DOCD: CPT | Mod: CPTII,S$GLB,, | Performed by: FAMILY MEDICINE

## 2024-08-28 PROCEDURE — 1126F AMNT PAIN NOTED NONE PRSNT: CPT | Mod: CPTII,S$GLB,, | Performed by: FAMILY MEDICINE

## 2024-08-28 PROCEDURE — 3078F DIAST BP <80 MM HG: CPT | Mod: CPTII,S$GLB,, | Performed by: FAMILY MEDICINE

## 2024-08-28 PROCEDURE — 96372 THER/PROPH/DIAG INJ SC/IM: CPT | Mod: S$GLB,,, | Performed by: FAMILY MEDICINE

## 2024-08-28 RX ORDER — CEFTRIAXONE 1 G/1
1 INJECTION, POWDER, FOR SOLUTION INTRAMUSCULAR; INTRAVENOUS
Status: COMPLETED | OUTPATIENT
Start: 2024-08-28 | End: 2024-08-28

## 2024-08-28 RX ORDER — SULFAMETHOXAZOLE AND TRIMETHOPRIM 800; 160 MG/1; MG/1
1 TABLET ORAL 2 TIMES DAILY
Qty: 20 TABLET | Refills: 0 | Status: SHIPPED | OUTPATIENT
Start: 2024-08-28 | End: 2024-09-07

## 2024-08-28 RX ORDER — LIDOCAINE HYDROCHLORIDE 10 MG/ML
2 INJECTION, SOLUTION INFILTRATION; PERINEURAL ONCE
Status: COMPLETED | OUTPATIENT
Start: 2024-08-28 | End: 2024-08-28

## 2024-08-28 RX ADMIN — LIDOCAINE HYDROCHLORIDE 2 ML: 10 INJECTION, SOLUTION INFILTRATION; PERINEURAL at 10:08

## 2024-08-28 RX ADMIN — CEFTRIAXONE 1 G: 1 INJECTION, POWDER, FOR SOLUTION INTRAMUSCULAR; INTRAVENOUS at 10:08

## 2024-08-28 NOTE — PROGRESS NOTES
"Subjective:   Patient ID: Pravin Ferrari is a 83 y.o. male.  Chief Complaint:  Hand Injury    Presents for evaluation of right hand injury   2-3 days ago   Unsure mechanism but resultant flap type laceration dorsal side 5th metacarpal area with additional linear laceration proximal phalangeal joint of 5th digit   4th digit with mild abrasion type lesion proximal phalangeal joint  Evaluated in urgent care   Area cleanse   No repair attempted   Placed on Keflex   Concerned today because of continued bleeding and some increased swelling and redness  Patient denies significant pain but does have underlying dementia   Is able to squeeze hand without significant discomfort    Review of Systems   Skin:  Positive for color change, pallor, rash and wound.   Neurological:  Negative for weakness and numbness.       Objective:   /64 (BP Location: Left arm, Patient Position: Sitting, BP Method: Large (Manual))   Pulse 86   Ht 5' 10" (1.778 m)   Wt 73.6 kg (162 lb 4.1 oz)   SpO2 96%   BMI 23.28 kg/m²     Physical Exam  Vitals and nursing note reviewed.   Constitutional:       Appearance: Normal appearance. He is well-developed and normal weight.   Skin:     Comments:   Mild swelling owner sign right hand involving 4th and 5th digit but not wrist   Minimal redness   No warmth   No pain with wrist movement   Is able to clench a fist  Dorsal side 5th metacarpal area with flap-like laceration approximated and healing with minimal drainage  Right 5th digit proximal phalangeal area with linear laceration approximated and healed without drainage   Right 4th digit with abrasion proximal phalangeal area     Procedure note   All wounds sterilely cleansed with soap and water   Steri-Strips applied to 5th digit linear laceration and dorsal side flap laceration  Tolerated procedure without difficulty    Assessment:       ICD-10-CM ICD-9-CM   1. Cellulitis of right hand  L03.113 682.4   2. Laceration of right hand without foreign " body, sequela  S61.411S 906.1   3. Laceration of right little finger without foreign body without damage to nail, sequela  S61.216S 906.1     Plan:   Cellulitis of right hand  Laceration of right hand without foreign body, sequela  Laceration of right little finger without foreign body without damage to nail, sequela  -     cefTRIAXone injection 1 g  -     LIDOcaine HCL 10 mg/ml (1%) injection 2 mL  -     sulfamethoxazole-trimethoprim 800-160mg (BACTRIM DS) 800-160 mg Tab; Take 1 tablet by mouth 2 (two) times daily. for 10 days  Dispense: 20 tablet; Refill: 0    Apply topical antibiotic ointment to 4th digit lesion after cleansing with soap and water   Okay to get hand went despite placement of Steri-Strips   Continue Keflex   Add Bactrim   Rocephin shot in office   Will reassess next week   Discussed if any increased redness, pain, swelling, warmth, or drainage would need to go to ER for additional evaluation  Family members expressed agreement and understanding to the above plan    45 minutes of total time spent on the encounter, which includes face to face time and non-face to face time preparing to see the patient (eg, review of tests), Obtaining and/or reviewing separately obtained history, documenting clinical information in the electronic or other health record, independently interpreting results (not separately reported) and communicating results to the patient/family/caregiver, or Care coordination (not separately reported).

## 2024-09-03 ENCOUNTER — OFFICE VISIT (OUTPATIENT)
Dept: INTERNAL MEDICINE | Facility: CLINIC | Age: 84
End: 2024-09-03
Payer: MEDICARE

## 2024-09-03 VITALS
SYSTOLIC BLOOD PRESSURE: 120 MMHG | HEIGHT: 70 IN | BODY MASS INDEX: 23.48 KG/M2 | DIASTOLIC BLOOD PRESSURE: 76 MMHG | OXYGEN SATURATION: 98 % | WEIGHT: 164 LBS | TEMPERATURE: 97 F | RESPIRATION RATE: 18 BRPM | HEART RATE: 73 BPM

## 2024-09-03 DIAGNOSIS — L03.113 CELLULITIS OF RIGHT HAND: Primary | ICD-10-CM

## 2024-09-03 DIAGNOSIS — S61.411S LACERATION OF RIGHT HAND WITHOUT FOREIGN BODY, SEQUELA: ICD-10-CM

## 2024-09-03 DIAGNOSIS — S61.216S LACERATION OF RIGHT LITTLE FINGER WITHOUT FOREIGN BODY WITHOUT DAMAGE TO NAIL, SEQUELA: ICD-10-CM

## 2024-09-03 PROCEDURE — 1101F PT FALLS ASSESS-DOCD LE1/YR: CPT | Mod: CPTII,S$GLB,, | Performed by: FAMILY MEDICINE

## 2024-09-03 PROCEDURE — 1160F RVW MEDS BY RX/DR IN RCRD: CPT | Mod: CPTII,S$GLB,, | Performed by: FAMILY MEDICINE

## 2024-09-03 PROCEDURE — 3288F FALL RISK ASSESSMENT DOCD: CPT | Mod: CPTII,S$GLB,, | Performed by: FAMILY MEDICINE

## 2024-09-03 PROCEDURE — 1159F MED LIST DOCD IN RCRD: CPT | Mod: CPTII,S$GLB,, | Performed by: FAMILY MEDICINE

## 2024-09-03 PROCEDURE — 99213 OFFICE O/P EST LOW 20 MIN: CPT | Mod: S$GLB,,, | Performed by: FAMILY MEDICINE

## 2024-09-03 PROCEDURE — 1126F AMNT PAIN NOTED NONE PRSNT: CPT | Mod: CPTII,S$GLB,, | Performed by: FAMILY MEDICINE

## 2024-09-03 PROCEDURE — 3074F SYST BP LT 130 MM HG: CPT | Mod: CPTII,S$GLB,, | Performed by: FAMILY MEDICINE

## 2024-09-03 PROCEDURE — 3078F DIAST BP <80 MM HG: CPT | Mod: CPTII,S$GLB,, | Performed by: FAMILY MEDICINE

## 2024-09-03 PROCEDURE — 99999 PR PBB SHADOW E&M-EST. PATIENT-LVL III: CPT | Mod: PBBFAC,,, | Performed by: FAMILY MEDICINE

## 2024-09-03 NOTE — PROGRESS NOTES
"Subjective:   Patient ID: Pravin Ferrari is a 83 y.o. male.  Chief Complaint:  Follow-up    Presents for follow-up on hand laceration with cellulitis     Seen 1 week ago   Continued Keflex   Added Bactrim   Rocephin shot in office   Apply topical antibiotic ointment to 4th digit lesion after cleansing with soap and water   Okay to get hand went despite placement of Steri-Strips   Discussed if any increased redness, pain, swelling, warmth, or drainage would need to go to ER for additional evaluation    Since last visit, completed course of antibiotics as prescribed   Also had Steri-Strips fall off on their own   Significant improvement/resolution of redness   No warmth   No swelling   No fever   No pain with movement of hand  Finger lacerations fully healed   Flap laceration with minimal avulsion resulting in abrasion type injury    Due for annual physical exam October 20, 2024     No new complaints today    Review of Systems   Skin:  Positive for wound. Negative for color change, pallor and rash.   Neurological:  Negative for weakness and numbness.   Hematological:  Bruises/bleeds easily.       Objective:   /76 (BP Location: Right arm, Patient Position: Sitting, BP Method: Medium (Manual))   Pulse 73   Temp 97.4 °F (36.3 °C) (Tympanic)   Resp 18   Ht 5' 10" (1.778 m)   Wt 74.4 kg (164 lb 0.4 oz)   SpO2 98%   BMI 23.53 kg/m²     Physical Exam  Vitals and nursing note reviewed.   Constitutional:       Appearance: Normal appearance. He is well-developed and normal weight.   Musculoskeletal:      Right wrist: Normal.      Right hand: Laceration present. No swelling or tenderness. Normal range of motion. Normal strength.   Skin:     Comments:   Healed linear lacerations involving 4th and 5th digit right hand  Swelling and redness no longer present  No warmth     Dorsal side 5th metacarpal area with flap-like laceration approximated reasonably well with mild dehiscence   No purulent drainage  No surrounding " redness  Healthy pink granulation tissue after cleansing         Assessment:       ICD-10-CM ICD-9-CM   1. Cellulitis of right hand  L03.113 682.4   2. Laceration of right hand without foreign body, sequela  S61.411S 906.1   3. Laceration of right little finger without foreign body without damage to nail, sequela  S61.216S 906.1     Plan:   Cellulitis of right hand  Resolved/improved   No additional antibiotics needed     Laceration of right hand without foreign body, sequela  Continuing to heal   Cleanse 2 to 3 times a day with soap and water and/or soak 3 times a day   Apply antibiotic ointment     Laceration of right little finger without foreign body without damage to nail, sequela  Fully heal   No additional evaluation treatment needed     Return to clinic one-month for annual physical exam

## 2024-10-07 ENCOUNTER — OFFICE VISIT (OUTPATIENT)
Dept: OTOLARYNGOLOGY | Facility: CLINIC | Age: 84
End: 2024-10-07
Payer: MEDICARE

## 2024-10-07 VITALS — BODY MASS INDEX: 23.53 KG/M2 | HEIGHT: 70 IN

## 2024-10-07 DIAGNOSIS — H61.23 BILATERAL IMPACTED CERUMEN: Primary | ICD-10-CM

## 2024-10-07 PROCEDURE — 69210 REMOVE IMPACTED EAR WAX UNI: CPT | Mod: S$GLB,,, | Performed by: PHYSICIAN ASSISTANT

## 2024-10-07 PROCEDURE — 99499 UNLISTED E&M SERVICE: CPT | Mod: S$GLB,,, | Performed by: PHYSICIAN ASSISTANT

## 2024-10-07 PROCEDURE — 99999 PR PBB SHADOW E&M-EST. PATIENT-LVL II: CPT | Mod: PBBFAC,,, | Performed by: PHYSICIAN ASSISTANT

## 2024-10-08 NOTE — PROGRESS NOTES
"Subjective:   Patient ID: Pravin Ferrari is a 83 y.o. male.    Chief Complaint: Cerumen Impaction (Ear cleaning)    Patient is here to see me today for evaluation of a possible wax impaction in bilateral ears.  He has complaints of hearing loss in the affected ears, but denies pain or drainage.  This has been an issue in the past.  The patient has not been using any sort of ear drop to soften the wax.       Review of patient's allergies indicates:   Allergen Reactions    Lisinopril Swelling           Review of Systems   Constitutional:  Negative for fatigue, fever and unexpected weight change.   HENT:  Positive for hearing loss. Negative for congestion, ear discharge, ear pain, facial swelling, nosebleeds, postnasal drip, rhinorrhea, sinus pressure, sneezing, sore throat, tinnitus, trouble swallowing and voice change.    Eyes:  Negative for discharge, redness and itching.   Respiratory:  Negative for cough, choking, shortness of breath and wheezing.    Cardiovascular:  Negative for chest pain and palpitations.   Gastrointestinal:  Negative for abdominal pain.        No reflux.   Musculoskeletal:  Negative for neck pain.   Neurological:  Negative for dizziness, facial asymmetry, light-headedness and headaches.   Hematological:  Negative for adenopathy. Does not bruise/bleed easily.   Psychiatric/Behavioral:  Negative for agitation, behavioral problems, confusion and decreased concentration.          Objective:   Ht 5' 10" (1.778 m)   BMI 23.53 kg/m²     Physical Exam  Constitutional:       General: He is not in acute distress.     Appearance: He is well-developed.   HENT:      Head: Normocephalic and atraumatic.      Jaw: No trismus.      Right Ear: Hearing, tympanic membrane, ear canal and external ear normal. There is impacted cerumen.      Left Ear: Hearing, tympanic membrane, ear canal and external ear normal. There is impacted cerumen.      Nose: Nose normal. No nasal deformity, septal deviation, mucosal edema " or rhinorrhea.      Mouth/Throat:      Dentition: Normal dentition.      Pharynx: Uvula midline. No oropharyngeal exudate or uvula swelling.   Eyes:      General: No scleral icterus.     Conjunctiva/sclera: Conjunctivae normal.      Right eye: Right conjunctiva is not injected. No chemosis.     Left eye: Left conjunctiva is not injected. No chemosis.     Pupils: Pupils are equal, round, and reactive to light.   Neck:      Thyroid: No thyroid mass or thyromegaly.      Trachea: Trachea and phonation normal. No tracheal tenderness or tracheal deviation.   Pulmonary:      Effort: Pulmonary effort is normal. No accessory muscle usage or respiratory distress.      Breath sounds: No stridor.   Lymphadenopathy:      Head:      Right side of head: No submental, submandibular, preauricular or posterior auricular adenopathy.      Left side of head: No submental, submandibular, preauricular or posterior auricular adenopathy.      Cervical: No cervical adenopathy.      Right cervical: No superficial or deep cervical adenopathy.     Left cervical: No superficial or deep cervical adenopathy.   Skin:     General: Skin is warm and dry.      Findings: No erythema or rash.   Neurological:      Mental Status: He is alert and oriented to person, place, and time.      Cranial Nerves: No cranial nerve deficit.   Psychiatric:         Behavior: Behavior normal.         Thought Content: Thought content normal.        Procedure Note    CHIEF COMPLAINT:  Cerumen Impaction    Description:  The patient was seated in an exam chair.  An ear speculum was placed in the right EAC and was examined under the microscope.  Suction and/or loop curettes were used to remove a large cerumen impaction.  The tympanic membrane was visualized and was normal in appearance.  The procedure was repeated on the left side in a similar fashion.  The TM was intact and normal on this side as well.  The patient tolerated the procedure well.      Assessment:     1.  Bilateral impacted cerumen        Plan:     Bilateral impacted cerumen      Cerumen impaction:  Removed today without difficulty.  I would recommend the use of a wax softening drop, either over the counter Debrox or mineral oil, on a weekly basis.  I also instructed the patient to avoid Qtips.

## 2024-10-20 DIAGNOSIS — I67.2 CEREBRAL ATHEROSCLEROSIS: ICD-10-CM

## 2024-10-20 DIAGNOSIS — Z86.73 HISTORY OF CVA (CEREBROVASCULAR ACCIDENT) WITHOUT RESIDUAL DEFICITS: ICD-10-CM

## 2024-10-20 DIAGNOSIS — I70.0 AORTIC ATHEROSCLEROSIS: ICD-10-CM

## 2024-10-20 NOTE — TELEPHONE ENCOUNTER
Care Due:                  Date            Visit Type   Department     Provider  --------------------------------------------------------------------------------                                EP -                              PRIMARY      [facility] INTER[facility]  Last Visit: 09-      CARE ([facility])   MEDICINE       José Miguel Harrington                              EP -                              PRIMARY      [facility] INTER[facility]  Next Visit: 10-      CARE ([facility])   MEDICINE       José Miguel Harrington                                                            Last  Test          Frequency    Reason                     Performed    Due Date  --------------------------------------------------------------------------------    CBC.........  12 months..  clopidogreL..............  10-   10-    [facility] Embedded Care Due Messages. Reference number: [id number].   10/20/2024 9:12:01 AM CDT

## 2024-10-21 RX ORDER — CLOPIDOGREL BISULFATE 75 MG/1
75 TABLET ORAL DAILY
Qty: 90 TABLET | Refills: 3 | Status: SHIPPED | OUTPATIENT
Start: 2024-10-21

## 2024-10-24 ENCOUNTER — LAB VISIT (OUTPATIENT)
Dept: LAB | Facility: HOSPITAL | Age: 84
End: 2024-10-24
Attending: FAMILY MEDICINE
Payer: MEDICARE

## 2024-10-24 ENCOUNTER — OFFICE VISIT (OUTPATIENT)
Dept: INTERNAL MEDICINE | Facility: CLINIC | Age: 84
End: 2024-10-24
Payer: MEDICARE

## 2024-10-24 VITALS
BODY MASS INDEX: 23.45 KG/M2 | SYSTOLIC BLOOD PRESSURE: 124 MMHG | HEART RATE: 62 BPM | DIASTOLIC BLOOD PRESSURE: 86 MMHG | TEMPERATURE: 97 F | OXYGEN SATURATION: 98 % | HEIGHT: 70 IN | WEIGHT: 163.81 LBS

## 2024-10-24 DIAGNOSIS — I10 ESSENTIAL HYPERTENSION: Chronic | ICD-10-CM

## 2024-10-24 DIAGNOSIS — Z85.46 HISTORY OF PROSTATE CANCER: Chronic | ICD-10-CM

## 2024-10-24 DIAGNOSIS — R32 URINARY INCONTINENCE, UNSPECIFIED TYPE: ICD-10-CM

## 2024-10-24 DIAGNOSIS — Z86.73 HISTORY OF CVA (CEREBROVASCULAR ACCIDENT) WITHOUT RESIDUAL DEFICITS: ICD-10-CM

## 2024-10-24 DIAGNOSIS — M79.675 PAIN DUE TO ONYCHOMYCOSIS OF TOENAILS OF BOTH FEET: ICD-10-CM

## 2024-10-24 DIAGNOSIS — I70.0 AORTIC ATHEROSCLEROSIS: Chronic | ICD-10-CM

## 2024-10-24 DIAGNOSIS — F02.B18 MODERATE MAJOR NEUROCOGNITIVE DISORDER DUE TO MULTIPLE ETIOLOGIES WITH BEHAVIORAL DISTURBANCE: Chronic | ICD-10-CM

## 2024-10-24 DIAGNOSIS — B35.1 PAIN DUE TO ONYCHOMYCOSIS OF TOENAILS OF BOTH FEET: ICD-10-CM

## 2024-10-24 DIAGNOSIS — Z00.00 ANNUAL PHYSICAL EXAM: Primary | ICD-10-CM

## 2024-10-24 DIAGNOSIS — R73.9 ELEVATED SERUM GLUCOSE: ICD-10-CM

## 2024-10-24 DIAGNOSIS — L60.9 NAIL ABNORMALITIES: ICD-10-CM

## 2024-10-24 DIAGNOSIS — I67.2 CEREBRAL ATHEROSCLEROSIS: Chronic | ICD-10-CM

## 2024-10-24 DIAGNOSIS — Z23 NEED FOR INFLUENZA VACCINATION: ICD-10-CM

## 2024-10-24 DIAGNOSIS — M79.674 PAIN DUE TO ONYCHOMYCOSIS OF TOENAILS OF BOTH FEET: ICD-10-CM

## 2024-10-24 DIAGNOSIS — Z00.00 ANNUAL PHYSICAL EXAM: ICD-10-CM

## 2024-10-24 LAB
ALBUMIN SERPL BCP-MCNC: 3.6 G/DL (ref 3.5–5.2)
ALP SERPL-CCNC: 78 U/L (ref 40–150)
ALT SERPL W/O P-5'-P-CCNC: 24 U/L (ref 10–44)
ANION GAP SERPL CALC-SCNC: 7 MMOL/L (ref 8–16)
AST SERPL-CCNC: 28 U/L (ref 10–40)
BILIRUB SERPL-MCNC: 0.8 MG/DL (ref 0.1–1)
BUN SERPL-MCNC: 13 MG/DL (ref 8–23)
CALCIUM SERPL-MCNC: 9.5 MG/DL (ref 8.7–10.5)
CHLORIDE SERPL-SCNC: 108 MMOL/L (ref 95–110)
CHOLEST SERPL-MCNC: 120 MG/DL (ref 120–199)
CHOLEST/HDLC SERPL: 2.2 {RATIO} (ref 2–5)
CO2 SERPL-SCNC: 28 MMOL/L (ref 23–29)
COMPLEXED PSA SERPL-MCNC: 0.02 NG/ML (ref 0–4)
CREAT SERPL-MCNC: 1.1 MG/DL (ref 0.5–1.4)
ERYTHROCYTE [DISTWIDTH] IN BLOOD BY AUTOMATED COUNT: 13.2 % (ref 11.5–14.5)
EST. GFR  (NO RACE VARIABLE): >60 ML/MIN/1.73 M^2
ESTIMATED AVG GLUCOSE: 114 MG/DL (ref 68–131)
GLUCOSE SERPL-MCNC: 97 MG/DL (ref 70–110)
HBA1C MFR BLD: 5.6 % (ref 4–5.6)
HCT VFR BLD AUTO: 44.6 % (ref 40–54)
HDLC SERPL-MCNC: 54 MG/DL (ref 40–75)
HDLC SERPL: 45 % (ref 20–50)
HGB BLD-MCNC: 14.3 G/DL (ref 14–18)
LDLC SERPL CALC-MCNC: 55.2 MG/DL (ref 63–159)
MCH RBC QN AUTO: 29.9 PG (ref 27–31)
MCHC RBC AUTO-ENTMCNC: 32.1 G/DL (ref 32–36)
MCV RBC AUTO: 93 FL (ref 82–98)
NONHDLC SERPL-MCNC: 66 MG/DL
PLATELET # BLD AUTO: 259 K/UL (ref 150–450)
PMV BLD AUTO: 10.8 FL (ref 9.2–12.9)
POTASSIUM SERPL-SCNC: 4.4 MMOL/L (ref 3.5–5.1)
PROT SERPL-MCNC: 7.2 G/DL (ref 6–8.4)
RBC # BLD AUTO: 4.79 M/UL (ref 4.6–6.2)
SODIUM SERPL-SCNC: 143 MMOL/L (ref 136–145)
TRIGL SERPL-MCNC: 54 MG/DL (ref 30–150)
TSH SERPL DL<=0.005 MIU/L-ACNC: 1.25 UIU/ML (ref 0.4–4)
WBC # BLD AUTO: 7.69 K/UL (ref 3.9–12.7)

## 2024-10-24 PROCEDURE — 36415 COLL VENOUS BLD VENIPUNCTURE: CPT | Performed by: FAMILY MEDICINE

## 2024-10-24 PROCEDURE — 84153 ASSAY OF PSA TOTAL: CPT | Performed by: FAMILY MEDICINE

## 2024-10-24 PROCEDURE — 99999 PR PBB SHADOW E&M-EST. PATIENT-LVL III: CPT | Mod: PBBFAC,,, | Performed by: FAMILY MEDICINE

## 2024-10-24 PROCEDURE — 80061 LIPID PANEL: CPT | Performed by: FAMILY MEDICINE

## 2024-10-24 PROCEDURE — 83036 HEMOGLOBIN GLYCOSYLATED A1C: CPT | Performed by: FAMILY MEDICINE

## 2024-10-24 PROCEDURE — 84443 ASSAY THYROID STIM HORMONE: CPT | Performed by: FAMILY MEDICINE

## 2024-10-24 PROCEDURE — 85027 COMPLETE CBC AUTOMATED: CPT | Performed by: FAMILY MEDICINE

## 2024-10-24 PROCEDURE — 80053 COMPREHEN METABOLIC PANEL: CPT | Performed by: FAMILY MEDICINE

## 2024-10-24 RX ORDER — ATORVASTATIN CALCIUM 40 MG/1
40 TABLET, FILM COATED ORAL DAILY
Qty: 90 TABLET | Refills: 3 | Status: SHIPPED | OUTPATIENT
Start: 2024-10-24 | End: 2025-10-24

## 2024-10-24 NOTE — PROGRESS NOTES
Subjective:   Patient ID: Pravin Ferrari is a 83 y.o. male.  Chief Complaint:  Annual Exam    Presents for annual physical exam   Visit completed with wife in room throughout the exam.       Last visit September 2024  Cellulitis of right hand . Resolved/improved. No additional antibiotics needed   Laceration of right hand without foreign body. Continuing to heal. Cleanse 2 to 3 times a day with soap and water and/or soak 3 times a day. Apply antibiotic ointment   Laceration of right little finger without foreign body without damage to nail. Fully healed. No additional evaluation treatment needed     Recent visit to urgent care 2 weeks ago for bronchitis   Treated with doxycycline  Symptoms resolved/improved    Last annual exam October 2023  CBC with mild but stable anemia.  Normal white cell count and platelet levels.  Take daily multivitamin.    Lipid panel with LDL at goal less than 70. Continue aspirin 81 mg daily, Plavix 75 mg daily, and Lipitor 40 mg daily  A1c is in a normal range. No Prediabtes or Diabetes  Thyroid testing is normal.     Medical History:  - Cerebral atherosclerosis, Aortic atherosclerosis, History of CVA (cerebrovascular accident) without residual deficits.  On aspirin 81 mg daily, Plavix 75 mg daily, and Lipitor 40 mg daily.  Last lipid panel with LDL at goal less than 70. Reports compliance with medications. Denies side effects.  No recurrent TIA or CVA symptoms or chest pain.  Needs repeat lipid panel.  - Hypertension.  Controlled.  On amlodipine 5 mg daily.  Reports compliance.  Denies side effects.  Denies shortness of breath or swelling.  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  Followed by Neurology.  On Aricept 10 mg daily and Namenda 10 mg twice a day.  Reports compliance.  Denies side effects.  Some mild decline in cognitive function since last visit.  Continues to remain without any other behavioral, psychotic, or mood disturbances.  - History prostate  "cancer.  Followed by external Urology.  Serial PSAs have remained normal/undetectable.  Needs repeat PSA.  Does complain of urinary leakage/incontinence/frequency.  Wearing adult briefs.  Worse than previous residual deficits from prostate surgery.  Unsure due to forgetting to go to the bathroom due to dementia or having significantly decreased sensation than urination as needed      Health maintenance:  - Needs shingles vaccine series   - Needs RSV vaccine  - Needs COVID booster  - Needs flu vaccine     Other than urinary issues, no additional complaints today      Review of Systems   Constitutional:  Negative for chills, diaphoresis, fatigue and fever.   HENT:  Positive for hearing loss.    Eyes:  Negative for visual disturbance.   Respiratory:  Negative for cough, chest tightness and shortness of breath.    Cardiovascular:  Negative for chest pain, palpitations and leg swelling.   Gastrointestinal:  Negative for abdominal pain, constipation, diarrhea, nausea and vomiting.   Endocrine: Positive for polyuria. Negative for cold intolerance, heat intolerance, polydipsia and polyphagia.   Genitourinary:  Positive for frequency. Negative for decreased urine volume, difficulty urinating, dysuria, enuresis, flank pain, hematuria, penile discharge, penile pain, penile swelling, scrotal swelling, testicular pain and urgency.   Musculoskeletal:  Negative for myalgias and neck pain.   Skin:  Negative for rash.   Neurological:  Negative for dizziness, tremors, syncope, weakness, light-headedness, numbness and headaches.   Hematological:  Bruises/bleeds easily.   Psychiatric/Behavioral:  Positive for confusion and decreased concentration. Negative for agitation, behavioral problems, dysphoric mood, hallucinations and sleep disturbance. The patient is not nervous/anxious.      Objective:   /86 (BP Location: Left arm, Patient Position: Sitting)   Pulse 62   Temp 97.3 °F (36.3 °C) (Tympanic)   Ht 5' 10" (1.778 m)   Wt " 74.3 kg (163 lb 12.8 oz)   SpO2 98%   BMI 23.50 kg/m²     Physical Exam  Vitals and nursing note reviewed.   Constitutional:       Appearance: Normal appearance. He is well-developed and normal weight.   HENT:      Right Ear: Decreased hearing noted.      Left Ear: Decreased hearing noted.      Nose: Nose normal. No congestion or rhinorrhea.   Eyes:      General: No scleral icterus.     Conjunctiva/sclera: Conjunctivae normal.   Neck:      Thyroid: No thyroid mass, thyromegaly or thyroid tenderness.      Vascular: Normal carotid pulses. No carotid bruit or JVD.   Cardiovascular:      Rate and Rhythm: Normal rate and regular rhythm.      Heart sounds: Normal heart sounds. No murmur heard.  Pulmonary:      Effort: Pulmonary effort is normal.      Breath sounds: Normal breath sounds and air entry.   Abdominal:      General: There is no distension.      Palpations: Abdomen is soft.      Tenderness: There is no abdominal tenderness.   Musculoskeletal:      Right lower leg: No edema.      Left lower leg: No edema.   Feet:      Right foot:      Toenail Condition: Right toenails are abnormally thick and long. Fungal disease present.     Left foot:      Toenail Condition: Left toenails are abnormally thick and long. Fungal disease present.  Skin:     Findings: Bruising and ecchymosis present. No abrasion or rash.   Neurological:      Cranial Nerves: No dysarthria or facial asymmetry.      Gait: Gait normal.   Psychiatric:         Attention and Perception: He is inattentive.         Mood and Affect: Affect normal. Mood is anxious.         Cognition and Memory: Cognition is impaired. Memory is impaired.       Assessment:       ICD-10-CM ICD-9-CM   1. Annual physical exam  Z00.00 V70.0   2. Need for influenza vaccination  Z23 V04.81   3. Essential hypertension  I10 401.9   4. Aortic atherosclerosis  I70.0 440.0   5. Cerebral atherosclerosis  I67.2 437.0   6. History of CVA (cerebrovascular accident) without residual deficits   Z86.73 V12.54   7. 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 Pain due to onychomycosis of toenails of both feet  B35.1 110.1    M79.675 729.5    M79.674    9. Nail abnormalities  L60.9 703.9   10. Elevated serum glucose  R73.9 790.29   11. Urinary incontinence, unspecified type  R32 788.30   12. History of prostate cancer  Z85.46 V10.46     Plan:   Annual physical exam  Need for influenza vaccination  -     CBC Without Differential; Future; Expected date: 10/24/2024  -     Comprehensive Metabolic Panel; Future; Expected date: 10/24/2024  -     Lipid Panel; Future; Expected date: 10/24/2024  -     TSH; Future; Expected date: 10/24/2024  -     Hemoglobin A1C; Future; Expected date: 10/24/2024  -     Urinalysis, Reflex to Urine Culture Urine, Clean Catch; Future; Expected date: 10/24/2024  -     influenza (adjuvanted) (Fluad) 45 mcg/0.5 mL IM vaccine (> or = 66 yo) 0.5 mL  Blood pressure normal.  BMI 23.5.  Overall exam unchanged from last year   Check labs.  Treat as indicated   Tetanus booster up-to-date  Reports completed shingles vaccine series   Plans to obtain COVID booster and RSV vaccine pharmacy   Flu vaccine today    Essential hypertension  Controlled rate stable.  Asymptomatic.  BP at goal.    Continue amlodipine 5 mg daily    Aortic atherosclerosis  Cerebral atherosclerosis  History of CVA (cerebrovascular accident) without residual deficits  -     atorvastatin (LIPITOR) 40 MG tablet; Take 1 tablet (40 mg total) by mouth once daily.  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  Stable   Mild cognitive decline over past 12 months   Continue Aricept 10 mg nightly   Continue Namenda 10 mg twice a day  Follow-up neurology  as scheduled    Pain due to onychomycosis of toenails of both feet  Nail abnormalities  Keep appointment with Podiatry as scheduled for nail care     Elevated serum glucose  -     Hemoglobin A1C; Future; Expected date: 10/24/2024  Treat for prediabetes diabetes if indicated     Urinary incontinence, unspecified type  History of prostate cancer  -     Hemoglobin A1C; Future; Expected date: 10/24/2024  -     Urinalysis, Reflex to Urine Culture Urine, Clean Catch; Future; Expected date: 10/24/2024  -     PROSTATE SPECIFIC ANTIGEN, DIAGNOSTIC; Future; Expected date: 10/24/2024  Check labs   Treat underlying cause if any identified for increased urinary symptoms   If no cause identified, do not recommend medications due to anti cholinergic  side effects cause increased confusion  Continue adult briefs   Frequent reminders/support to Crestor room     Return to clinic 1 year for annual exam or sooner as needed

## 2024-10-29 ENCOUNTER — OFFICE VISIT (OUTPATIENT)
Dept: PODIATRY | Facility: CLINIC | Age: 84
End: 2024-10-29
Payer: MEDICARE

## 2024-10-29 VITALS — BODY MASS INDEX: 23.5 KG/M2 | HEIGHT: 70 IN

## 2024-10-29 DIAGNOSIS — I67.2 CEREBRAL ATHEROSCLEROSIS: Chronic | ICD-10-CM

## 2024-10-29 DIAGNOSIS — L60.3 ONYCHODYSTROPHY: ICD-10-CM

## 2024-10-29 DIAGNOSIS — Z79.01 CURRENT USE OF ANTICOAGULANT THERAPY: Primary | ICD-10-CM

## 2024-10-29 DIAGNOSIS — Z86.73 HISTORY OF CVA (CEREBROVASCULAR ACCIDENT) WITHOUT RESIDUAL DEFICITS: ICD-10-CM

## 2024-10-29 DIAGNOSIS — F02.B18 MODERATE MAJOR NEUROCOGNITIVE DISORDER DUE TO MULTIPLE ETIOLOGIES WITH BEHAVIORAL DISTURBANCE: ICD-10-CM

## 2024-10-29 PROCEDURE — 99999 PR PBB SHADOW E&M-EST. PATIENT-LVL III: CPT | Mod: PBBFAC,,, | Performed by: PODIATRIST

## 2024-11-08 ENCOUNTER — TELEPHONE (OUTPATIENT)
Dept: NEUROLOGY | Facility: CLINIC | Age: 84
End: 2024-11-08
Payer: MEDICARE

## 2024-11-08 NOTE — TELEPHONE ENCOUNTER
----- Message from Giselle sent at 11/8/2024  2:10 PM CST -----  Contact: Wife, Radha, [phone]  Calling to speak with the nurse regarding he took a double dose of Rx memantine (NAMENDA) 10 MG Tab and Rx donepeziL (ARICEPT) 10 MG tablet last night. They called poison control. Was told to call physician to inquire when he should take it again. Please call her. Thanks.

## 2024-11-08 NOTE — TELEPHONE ENCOUNTER
He took a double dose of Rx memantine (NAMENDA) 10 MG Tab and Rx donepeziL (ARICEPT) 10 MG tablet last night. Wife would like to know when she should restart the medication. Pt is not showing any signs of any symptoms.

## 2025-01-02 DIAGNOSIS — I10 ESSENTIAL HYPERTENSION: ICD-10-CM

## 2025-01-02 RX ORDER — AMLODIPINE BESYLATE 5 MG/1
5 TABLET ORAL
Qty: 90 TABLET | Refills: 3 | Status: SHIPPED | OUTPATIENT
Start: 2025-01-02

## 2025-01-02 NOTE — TELEPHONE ENCOUNTER
No care due was identified.  [facility] Embedded Care Due Messages. Reference number: [id number].   1/02/2025 4:27:26 AM CST

## 2025-01-03 NOTE — TELEPHONE ENCOUNTER
Refill Decision Note   Pravin Ferrari  is requesting a refill authorization.  Brief Assessment and Rationale for Refill:  Approve     Medication Therapy Plan:         Comments:     Note composed:9:17 PM 01/02/2025

## 2025-01-09 ENCOUNTER — OFFICE VISIT (OUTPATIENT)
Dept: NEUROLOGY | Facility: CLINIC | Age: 85
End: 2025-01-09
Payer: MEDICARE

## 2025-01-09 VITALS
SYSTOLIC BLOOD PRESSURE: 114 MMHG | BODY MASS INDEX: 23.7 KG/M2 | HEART RATE: 68 BPM | HEIGHT: 70 IN | DIASTOLIC BLOOD PRESSURE: 66 MMHG | WEIGHT: 165.56 LBS | RESPIRATION RATE: 16 BRPM

## 2025-01-09 DIAGNOSIS — Z86.73 HISTORY OF CVA (CEREBROVASCULAR ACCIDENT) WITHOUT RESIDUAL DEFICITS: ICD-10-CM

## 2025-01-09 DIAGNOSIS — I70.0 AORTIC ATHEROSCLEROSIS: ICD-10-CM

## 2025-01-09 DIAGNOSIS — G30.9 MODERATE MAJOR NEUROCOGNITIVE DISORDER DUE TO ALZHEIMER'S DISEASE WITH BEHAVIORAL DISTURBANCE: Primary | ICD-10-CM

## 2025-01-09 DIAGNOSIS — T78.3XXA ANGIOEDEMA DUE TO ANGIOTENSIN CONVERTING ENZYME INHIBITOR (ACE-I): ICD-10-CM

## 2025-01-09 DIAGNOSIS — Z85.46 HISTORY OF PROSTATE CANCER: Chronic | ICD-10-CM

## 2025-01-09 DIAGNOSIS — F03.A0 MILD DEMENTIA WITHOUT BEHAVIORAL DISTURBANCE, PSYCHOTIC DISTURBANCE, MOOD DISTURBANCE, OR ANXIETY, UNSPECIFIED DEMENTIA TYPE: Chronic | ICD-10-CM

## 2025-01-09 DIAGNOSIS — I67.2 CEREBRAL ATHEROSCLEROSIS: Chronic | ICD-10-CM

## 2025-01-09 DIAGNOSIS — R13.10 DYSPHAGIA, UNSPECIFIED TYPE: ICD-10-CM

## 2025-01-09 DIAGNOSIS — R41.3 MEMORY DIFFICULTIES: ICD-10-CM

## 2025-01-09 DIAGNOSIS — F02.B18 MODERATE MAJOR NEUROCOGNITIVE DISORDER DUE TO MULTIPLE ETIOLOGIES WITH BEHAVIORAL DISTURBANCE: ICD-10-CM

## 2025-01-09 DIAGNOSIS — T46.4X5A ANGIOEDEMA DUE TO ANGIOTENSIN CONVERTING ENZYME INHIBITOR (ACE-I): ICD-10-CM

## 2025-01-09 DIAGNOSIS — I10 ESSENTIAL HYPERTENSION: ICD-10-CM

## 2025-01-09 DIAGNOSIS — R41.3 OTHER AMNESIA: ICD-10-CM

## 2025-01-09 DIAGNOSIS — F02.B18 MODERATE MAJOR NEUROCOGNITIVE DISORDER DUE TO ALZHEIMER'S DISEASE WITH BEHAVIORAL DISTURBANCE: Primary | ICD-10-CM

## 2025-01-09 PROCEDURE — 99999 PR PBB SHADOW E&M-EST. PATIENT-LVL IV: CPT | Mod: PBBFAC,,, | Performed by: NURSE PRACTITIONER

## 2025-01-09 RX ORDER — BUSPIRONE HYDROCHLORIDE 5 MG/1
5 TABLET ORAL DAILY
Qty: 30 TABLET | Refills: 11 | Status: SHIPPED | OUTPATIENT
Start: 2025-01-09 | End: 2026-01-09

## 2025-01-09 RX ORDER — DONEPEZIL HYDROCHLORIDE 10 MG/1
10 TABLET, FILM COATED ORAL NIGHTLY
Qty: 90 TABLET | Refills: 3 | Status: SHIPPED | OUTPATIENT
Start: 2025-01-09 | End: 2026-01-09

## 2025-01-09 RX ORDER — MEMANTINE HYDROCHLORIDE 10 MG/1
10 TABLET ORAL 2 TIMES DAILY
Qty: 180 TABLET | Refills: 3 | Status: SHIPPED | OUTPATIENT
Start: 2025-01-09 | End: 2026-01-09

## 2025-01-09 NOTE — PROGRESS NOTES
Subjective:       Patient ID: Pravin Ferrari is a 84 y.o. male.    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      The patient is presenting with memory loss that began approximately in 2019. The patient is accompanied by spouse and sister. The main problems the patient has are related to progressive short term memory loss. In 2022 the patient memory worsened and the patient was evaluated at [facility] [place] and diagnosed with dementia. Wife gave many examples, the patient would repeat the same question repeatedly. The patient sometimes look at pictures of  grand kids and unable to recall who they are or unable to recall their names. The patient is no longer driving stopped driving in 2020, he began to get lost.  The patient is not losing personal items and does not put them in odd places. No confusion around and inside the house. Patient has trouble remembering the date and the day weeks. Patient spouse manages medications and appointments Patient able to recall major holidays. Patient is unaware of political changes. Patient wife handling finances. The patient is still independent with ADLs, able to give his own bath, dress hisself, wife assist with clothes selection. Has some mild agitation. No hallucinations or delusions. No seizures. No problems handling tools. History of multiple strokes MRI Brain WO 01- Left basal ganglia Small focus of acute or subacute ischemia left basal ganglia/posterior limb internal capsule. Patient had speech changes, slurred speech, Right sided weakness and numbness deficit.  Stroke risk factor HTN, HLD, Takes ASA 81 mg po daily and Plavix 75 mg po daily.   No history of headaches. No history of hypothyroidism. No history of alcoholism.  No history of B12 deficiency. No history of depression. No history of bipolar disorder. No history of Untreated Syphilis.  No history of HIV infection. No  toxic exposures.  No history of traumatic brain injury. Mild bilateral tremors. No falls, unsteady wide based gait. . No urinary incontinence. No change in sleep and appetite good. No family history of dementia. Takes Aricept 10 mg po Night and Namenda 10 mg BID tolerating well, no side effects. Discussed Dr. Richards CNP results 06- CNP RESULTS Moderate major neurocognitive disorder due to multiple etiologies with behavioral disturbanceS, Alzheimer's disease, Cerebrovascular disease, HISTORY OF CVA with residual deficits, Cerebral atherosclerosis        INTERVAL HISTORY 01-:Patient present for follow up for memory management. Patient accompanied by spouse and granddaughter. Patient doing well. Tolerating Aricept 10 mg po night and Namenda 10 mg BID tolerating well, no side effects. No significant cognitive changes. However, wife reports some increased  behavior changes of anxious and easily agitation noted.Patient occasional has agitation but he is able to to be redirected. Wife is open to trying medication but reports he is sensitive to treatment. I will start Buspar 5 mg po daily.  Discussed plan of care. Patient and spouse agrees to treatment plan.             Review of Systems   Unable to perform ROS: Dementia   Constitutional: Negative.    HENT:  Positive for trouble swallowing.    Eyes: Negative.    Respiratory: Negative.     Gastrointestinal: Negative.    Endocrine: Negative.    Genitourinary: Negative.    Musculoskeletal:  Positive for arthralgias and gait problem.   Skin: Negative.    Allergic/Immunologic: Negative.    Neurological:  Positive for numbness.   Hematological: Negative.    Psychiatric/Behavioral:  Positive for agitation, confusion, decreased concentration and dysphoric mood. Negative for behavioral problems, hallucinations, self-injury, sleep disturbance and suicidal ideas. The patient is not nervous/anxious and is not hyperactive.                  Current Outpatient Medications:      amLODIPine (NORVASC) 5 MG tablet, TAKE 1 TABLET(5 MG) BY MOUTH EVERY DAY, Disp: 90 tablet, Rfl: 3    aspirin (ECOTRIN) 81 MG EC tablet, Take 81 mg by mouth once daily., Disp: , Rfl:     atorvastatin (LIPITOR) 40 MG tablet, Take 1 tablet (40 mg total) by mouth once daily., Disp: 90 tablet, Rfl: 3    clopidogreL (PLAVIX) 75 mg tablet, Take 1 tablet (75 mg total) by mouth once daily., Disp: 90 tablet, Rfl: 3    multivitamin (THERAGRAN) per tablet, Take 1 tablet by mouth once daily., Disp: , Rfl:     busPIRone (BUSPAR) 5 MG Tab, Take 1 tablet (5 mg total) by mouth once daily., Disp: 30 tablet, Rfl: 11    donepeziL (ARICEPT) 10 MG tablet, Take 1 tablet (10 mg total) by mouth nightly., Disp: 90 tablet, Rfl: 3    memantine (NAMENDA) 10 MG Tab, Take 1 tablet (10 mg total) by mouth 2 (two) times a day., Disp: 180 tablet, Rfl: 3  Past Medical History:   Diagnosis Date    Acute CVA (cerebrovascular accident) 1/11/2023    Cancer     prostate    [place] (hard of hearing)     bilateral ears    Hypertension     pt states no     Past Surgical History:   Procedure Laterality Date    COLONOSCOPY N/A 3/19/2019    Procedure: COLONOSCOPY;  Surgeon: Korin Cavazos MD;  Location: [facility];  Service: Endoscopy;  Laterality: N/A;    EYE SURGERY      HERNIA REPAIR      PROSTATE SURGERY       Social History     Socioeconomic History    Marital status:    Tobacco Use    Smoking status: Never    Smokeless tobacco: Never   Substance and Sexual Activity    Alcohol use: Not Currently    Drug use: No    Sexual activity: Not Currently             Past/Current Medical/Surgical History, Past/Current Social History, Past/Current Family History and Past/Current Medications were reviewed in detail.        Objective:           VITAL SIGNS WERE REVIEWED      GENERAL APPEARANCE:     The patient looks comfortable.    BMI 22.10 KG     No signs of respiratory distress.    Normal breathing pattern.    No dysmorphic features    Normal eye contact.      GENERAL MEDICAL EXAM:    HEENT:  Head is atraumatic normocephalic.     No tender temporal arteries. Fundoscopic (Ophthalmoscopic) exam showed no disc edema.      Neck and Axillae: No JVD. No visible lesions.    No carotid bruits. No thyromegaly. No lymphadenopathy.    Cardiopulmonary: No cyanosis. No tachypnea. Normal respiratory effort.    Gastrointestinal/Urogenital:  No jaundice. No stomas or lesions. No visible hernias. No catheters.     Abdomen is soft non-tender. No masses or organomegaly.    Skin, Hair and Nails: No pathognonomic skin rash. No neurofibromatosis. No visible lesions.No stigmata of autoimmune disease. No clubbing.    Skin is warm and moist. No palpable masses.    Limbs: No varicose veins. No visible swelling.    No palpable edema. Pulses are symmetric. Pedal pulses are palpable.      Muskoskeletal: No visible deformities.No visible lesions.    No spine tenderness. No signs of longstanding neuropathy. No dislocations or fractures.            Neurologic Exam     Mental Status   Oriented to person, place, and time.   Follows 3 step commands.   Attention: decreased. Concentration: decreased.   Speech: speech is normal and not slurred   Level of consciousness: alert  Knowledge: consistent with education and poor. Able to perform simple calculations.   Able to name object. Able to read. Able to repeat. Able to write. Abnormal comprehension.     Cranial Nerves     CN II   Visual fields full to confrontation.   Visual acuity: normal  Right visual field deficit: none  Left visual field deficit: none     CN III, IV, VI   Pupils are equal, round, and reactive to light.  Extraocular motions are normal.   Right pupil: Size: 2 mm. Shape: regular. Reactivity: brisk. Consensual response: intact. Accommodation: intact.   Left pupil: Size: 2 mm. Shape: regular. Reactivity: brisk. Consensual response: intact. Accommodation: intact.   CN III: no CN III palsy  CN VI: no CN VI palsy  Nystagmus: none   Diplopia:  none  Ophthalmoparesis: none  Upgaze: normal  Downgaze: normal  Conjugate gaze: present  Vestibulo-ocular reflex: present    CN V   Facial sensation intact.   Right facial sensation deficit: none  Left facial sensation deficit: none    CN VII   Left facial weakness: none  Left taste: normal    CN VIII   CN VIII normal.   Hearing: intact  Right Rinne: AC > BC  Left Rinne: AC > BC    CN IX, X   CN IX normal.   CN X normal.   Palate: symmetric    CN XI   CN XI normal.   Right sternocleidomastoid strength: normal  Left sternocleidomastoid strength: normal  Right trapezius strength: normal  Left trapezius strength: normal    CN XII   CN XII normal.   Tongue: not atrophic  Fasciculations: absent  Tongue deviation: none    Motor Exam   Muscle bulk: normal  Overall muscle tone: normal  Right arm tone: normal  Left arm tone: normal  Right arm pronator drift: absent  Left arm pronator drift: absent  Right leg tone: normal  Left leg tone: normal    Strength   Strength 5/5 throughout.   Right neck flexion: 5/5  Left neck flexion: 5/5  Right neck extension: 5/5  Left neck extension: 5/5  Right deltoid: 5/5  Left deltoid: 5/5  Right biceps: 5/5  Left biceps: 5/5  Right triceps: 5/5  Left triceps: 5/5  Right wrist flexion: 5/5  Left wrist flexion: 5/5  Right wrist extension: 5/5  Left wrist extension: 5/5  Right interossei: 5/5  Left interossei: 5/5  Right iliopsoas: 5/5  Left iliopsoas: 5/5  Right quadriceps: 5/5  Left quadriceps: 5/5  Right hamstrin/5  Left hamstrin/5  Right glutei: 5/5  Left glutei: 5/5  Right anterior tibial: 5/5  Left anterior tibial: 5/5  Right posterior tibial: 5/5  Left posterior tibial: 5/5  Right peroneal: 5/5  Left peroneal: 5/5  Right gastroc: 5/5  Left gastroc: 5/5    Sensory Exam   Light touch normal.   Right arm light touch: normal  Left arm light touch: normal  Right leg light touch: normal  Left leg light touch: normal  Vibration normal.   Right arm vibration: normal  Left arm vibration:  normal  Right leg vibration: normal  Left leg vibration: normal  Proprioception normal.   Right arm proprioception: normal  Left arm proprioception: normal  Right leg proprioception: normal  Left leg proprioception: normal  Right arm pinprick: normal  Left arm pinprick: normal  Right leg pinprick: decreased from toes  Left leg pinprick: decreased from toes  Sensory deficit distribution on left: lateral cutaneous thigh  Graphesthesia: normal  Stereognosis: normal    Gait, Coordination, and Reflexes     Gait  Gait: wide-based    Coordination   Romberg: negative  Finger to nose coordination: normal  Heel to shin coordination: normal  Tandem walking coordination: normal    Tremor   Resting tremor: absent  Intention tremor: absent  Action tremor: right arm    Reflexes   Right brachioradialis: 2+  Left brachioradialis: 2+  Right biceps: 2+  Left biceps: 2+  Right triceps: 2+  Left triceps: 2+  Right patellar: 2+  Left patellar: 2+  Right achilles: 0  Left achilles: 0  Right plantar: normal  Left plantar: normal  Right Pulido: absent  Left Pulido: absent  Right ankle clonus: absent  Left ankle clonus: absent  Right pendular knee jerk: absent  Left pendular knee jerk: absent      Mild postural tremors    Poor posture            FRIDA COGNITIVE ASSESSMENT (MOCA) TOTAL SCORE       MODERATE DEMENTIA 10-19    SEVERE DEMENTIA <10        DATE 01-       TOTAL SCORE 18/30       VISUOSPATIAL EXECUTIVE (5) 3       NAMING (3) 3       ATTENTION (6) 6       LANGUAGE (3) 3       ABSTRACTION(2) 1       RECALL (5) 0       ORIENTATION (6) 2           Lab Results   Component Value Date    WBC 7.69 10/24/2024    HGB 14.3 10/24/2024    HCT 44.6 10/24/2024    MCV 93 10/24/2024     10/24/2024     Sodium   Date Value Ref Range Status   10/24/2024 143 136 - 145 mmol/L Final     Potassium   Date Value Ref Range Status   10/24/2024 4.4 3.5 - 5.1 mmol/L Final     Chloride   Date Value Ref Range Status   10/24/2024 108 95 - 110  mmol/L Final     CO2   Date Value Ref Range Status   10/24/2024 28 23 - 29 mmol/L Final     Glucose   Date Value Ref Range Status   10/24/2024 97 70 - 110 mg/dL Final     BUN   Date Value Ref Range Status   10/24/2024 13 8 - 23 mg/dL Final     Creatinine   Date Value Ref Range Status   10/24/2024 1.1 0.5 - 1.4 mg/dL Final     Calcium   Date Value Ref Range Status   10/24/2024 9.5 8.7 - 10.5 mg/dL Final     Total Protein   Date Value Ref Range Status   10/24/2024 7.2 6.0 - 8.4 g/dL Final     Albumin   Date Value Ref Range Status   10/24/2024 3.6 3.5 - 5.2 g/dL Final     Total Bilirubin   Date Value Ref Range Status   10/24/2024 0.8 0.1 - 1.0 mg/dL Final     Comment:     For infants and newborns, interpretation of results should be based  on gestational age, weight and in agreement with clinical  observations.    Premature Infant recommended reference ranges:  Up to 24 hours.............<8.0 mg/dL  Up to 48 hours............<12.0 mg/dL  3-5 days..................<15.0 mg/dL  6-29 days.................<15.0 mg/dL       Alkaline Phosphatase   Date Value Ref Range Status   10/24/2024 78 40 - 150 U/L Final     AST   Date Value Ref Range Status   10/24/2024 28 10 - 40 U/L Final     ALT   Date Value Ref Range Status   10/24/2024 24 10 - 44 U/L Final     Anion Gap   Date Value Ref Range Status   10/24/2024 7 (L) 8 - 16 mmol/L Final     eGFR if    Date Value Ref Range Status   01/12/2022 >60 >60 mL/min/1.73 m^2 Final     eGFR if non    Date Value Ref Range Status   01/12/2022 >60 >60 mL/min/1.73 m^2 Final     Comment:     Calculation used to obtain the estimated glomerular filtration  rate (eGFR) is the CKD-EPI equation.        Lab Results   Component Value Date    [id number] 360 12/06/2021     Lab Results   Component Value Date    TSH 1.248 10/24/2024       EKG     03-    QT interval 410 HR 61         RADIOLOGY EVALUATION:    MRI BRAIN WO:    02-    No acute abnormality.   Moderately advanced atrophy and white matter degeneration.     Chronic left basal ganglia lacunar infarct with associated white matter degeneration extending into the left cerebral peduncle.     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      MRI showed multiple old strokes and moderate atrophy could been seen in patients with Vascular dementia . No acute findings  01-    MRI Brain WO     Small focus of acute or subacute ischemia left basal ganglia/posterior limb internal capsule.         02-    MRI Brain WO     Negative MRI Brain. Findings felt consistent with marked atrophy, microvascular change and previouse small vessel insults.     06-    CNP RESULTS      Moderate major neurocognitive disorder due to multiple etiologies with behavioral disturbance   Alzheimer's disease  Cerebrovascular disease    HISTORY OF CVA with residual deficits   Cerebral atherosclerosis    No results found in the last 24 hours.      Reviewed the neuroimaging independently       Assessment:       1. 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 Other amnesia    3. History of CVA (cerebrovascular accident) without residual deficits    4. Memory difficulties    5. Mild dementia without behavioral disturbance, psychotic disturbance, mood disturbance, or anxiety, unspecified dementia type    6. Cerebral atherosclerosis    7. Essential hypertension    8. Angioedema due to angiotensin converting enzyme inhibitor (ACE-I)    9. Aortic atherosclerosis    10. Moderate major neurocognitive disorder due to multiple etiologies with behavioral disturbance    11. History of prostate cancer    12. Dysphagia, unspecified type            Plan:         MODERATE MAJOR NEUROCOGNITIVE DISORDER DUE TO VASCULAR DEMENTIA VS ALZHEIMER'S DISEASE WITH BEHAVIORAL DISTURBANCE/ FORGETFULNESS/ AGITATION/ HX OF STROKE/ SWALLOWING DIFFICULTY         EVALUATION       No longer   driving.       DISEASE-MODIFYING AGENTS     Explained to the patient and family that medications are intended to slow down the progression (in the early stages of the disease) and not stop it or reverse the disease. The disease is relentless, progressive and so far, cannot be controlled. Progression includes Cognitive decline, Behavioral disturbances, and Motor decline as well.     I counseled the patient and family that the rate of progression is extremely variable, and the average (10 years) is an inaccurate measure.     CLASSES:    NMDA RECEPTOR ANTAGONISTS    Continue Namenda 10 mg BID.       CHOLINESTERASE INHIBITORS (CEI)    Continue Aricept 10 mg po HS     ANTI-AMYLOID MONOCLONAL ANTIBODIES (SPECIFICALLY FOR ALZHEIMER-TYPE DEMENTIA)      Aducanumab (Aduhelm), Lecanemab (Leqembi) and Donanemab. The actual cognitive benefits remain unsubstantiated and the risks (including death) may outweigh the benefit.The method of administration and cost remain prohibitive as well.  Will consider such options after high quality studies and post marketing experiencing demonstrate significant clinical efficacy and safety.         MISCELLANEOUS     [facility] study regarding Sildenafil (Viagra) impact on dementia showed possible benefit. Will wait for high quality prospective double-blinded placebo-controlled trials to make any proper recommendations.     Recommended against the use of OTC non-FDA evaluated supplements and claims.         SYMPTOMATIC MANAGEMENT-BEHAVIORAL SYMPTOMS AND NON-COGNITIVE SYMPTOMS     Will start Buspar 5 mg po Daily    ANTIDEPRESSANTS CLASS MEDICATIONS    FUTURE CONSIDERATION:    Trazodone 50 mg QHS to help with insomnia. Instructed the family to watch for excessive sedation or paradoxical agitation. Other options includes Mirtazapine (Remeron).    Sertraline (Zoloft) titration to 100 mg in the morning can mitigate anxiety symptoms.     Bupropion (Wellbutrin) small dose titration  mg in  the morning helps with attention and concentration in addition to depression and cravings.    ANTIEPILEPTIC CLASS MEDICATIONS     Lamotrigine (Lamictal) LTG 25 mg BID to help for agitation and mood stabilization and antiepileptic effect. Other options include: Valproic Acid (Depakote) VPA.      ANTIPSYCHOTIC AND NEUROLEPTIC CLASS MEDICATIONS     Risperdal 1 mg one hour before sunset to assist with sundowning , psychotic symptoms and behavioral disturbances. Other options include: Aripiprazole (Abilify),  Quetiapine (Seroquel) and  Brexipiprazole (Rixulti).          HOME CARE     Falling Down Precautions. Gait is affected by the disease as well.     Avoid driving and access to firearms     Help with finances and decision making.    Join support group.    Proofing the house and use labeling.    Avoid antihistamines and anticholinergics.    Avoid changing routine.    Use written reminders.    Avoid multitasking.    Healthy diet, exercise (physical and cognitive).    Good sleep hygiene.        HERE ARE 10 WAYS TO REDUCE RISK OF DEMENTIA AND SLOW DOWN THE PROGRESSION OF DEMENTIA        1.Be physically active.    2. Avoid smoking and alcohol consumption.    3. Track your numbers. Keep your blood pressure, cholesterol, blood sugar and weight within recommended ranges.    4. Stay socially connected.    5. Make healthy food choices. Eat a well-balance and healthy diet that rich in cereals, fish, legumes, and vegetables.    6. Reduce stress.     7. Challenge your brain by trying something new, playing games, or learning a new language.    8. Take care of your hearing. Avoid being continuously exposed to loud sounds and wear a hearing aid if hearing does become a problem.    9. Lower risk of falls. Consider installing handrails on all stairs and grab bars in bathrooms.    10. Reduce your exposure to air pollution, such as exposure to exhaust in heavy traffic.         CAREGIVERS COUNSELING     Recommend reading the following  books:     The 36-Hour Day and there are many online and printed resources to help caregivers as well.     Alzheimer's Through the Stages.     Dementia with G.R.A.C.E.            PREVENTION OF DELIRIUM       1. Good hydration and avoid electrolyte imbalance  2. Recognize and treat infections immediately especially UTI.  3. Bladder emptying and prevent constipation.   4. Provide stimulating activities and familiar objects  5. Use eyeglasses and hearing aids if needed.   6. Use simple and regular communication about people, current place, and time  7. Mobility and range-of-motion exercises  8. Reduce noise, lighting and avoid sleep interruptions  9. Non-narcotic pain management.  10.Nondrug treatment for sleep problems or anxiety  11. Avoid antihistamines.  12. Avoid narcotics.  13. Avoid benzodiazepines.            HELPFUL STRATEGIES FOR THE FAMILY AND CAREGIVERS        BEHAVIORAL MANAGEMENT STRATEGIES     Remember that you cannot reason with acute psychosis or confusion. Unless there is an immediate safety issue, there is no need to challenge or correct the person.  For example, if a pt is hallucinating, do not argue with the person that what they are seeing is not real. If they are expressing paranoia, empathize gently with their fear, and attempt to redirect them to a different activity.   If the person is particularly stuck on a topic or activity, as long as the activity is safe and is not worsening their agitation, you don't need to intervene.     Communicate calmly, simply, and concisely    Reassure the person that they are safe and you are here to help  Try not to express irritation or anger  Speak quietly and calmly, do not shout or threaten the person. Avoid provocation.   Establish verbal contact and provide orientation and reassurance.  Attempt to identify the patient's wants and feelings, listen to what they are saying, and reflect those wants and feelings back to the patient.  Dont use sarcasm as a  "weapon    Set clear limits on behavior in a calm voice ("You cannot hit the nurse.")  Offer choices and optimism ("Which toothpaste would you like to use today?")    Redirect the person to an alternative behavior ("Can you come help me with this?)    Avoid saying things like, "We already went over this!" "You can't keep doing this." "I already explained this to you"  Instead, simply nod calmly and acknowledge what the person is saying ("Yes, that makes sense."), and then request their help or company in a different behavior.   Having a mental list of activities the patient enjoys can be helpful with redirection. For example, do they enjoy going for walks? Eating a piece of candy?   If redirection becomes challenging, you can place objects that your family member enjoys strategically in the home in order to use for distraction. This is particularly useful if there are items that they often talk about or frequently ask you questions about; or if they are visually striking. Once you focus the person on this object, talk about it briefly until they are calm and then attempt to redirect to a new behavior.   Sometimes activities which are repetitive can be calming, particularly if there is a comforting sensory element. For example, pt may enjoy folding soft towels or laundry.    Limit overstimulation    Decrease distractions by turning off the TV, computer, any fluorescent lights that hum, etc.  Ask any casual visitors to leave--the fewer people the better  If the person is very agitated, avoid touching them, and respect their personal space  Sit down and ask the person to sit down also     PREVENTATIVE STRATEGIES     Try to help the patient develop a routine and stick to it  Address all immediate safety issues  Does the person still have access to the car and keys? Take the keys away, or disconnect the car battery.  Are they wandering at night? Install locks on the outside doors. A keypad lock works well. Alarms on " "bedroom doors can also be helpful.   Are they turning on the stove and risking a fire? If you cannot limit their access to the kitchen, you may need to unplug dangerous devices any time you are not in the room.   If the person is exhibiting poor judgment throughout the day, they likely need someone supervising them at all times.   Do they still have access to significant financial assets? Limit their access to accounts, and consult with a  if necessary.   Identify situations that seem to trigger agitation or a problematic behavior, and modify the person's environment to minimize or eliminate that trigger.   For example, is their behavior worse if they don't get a good night's sleep? Or if they don't eat or drink enough? If so, prioritize those activities and build behavior plans to support them.  Do they get upset about not being allowed to answer the phone when it rings? Put all of the phones in the house on "silent."   Do they become paranoid that certain family members are trying to take advantage of them? Limit contact with the targeted family member as much as possible, and re-introduce them slowly after some time has passed.   Encourage independence in daily living whenever safely possible  Encourage collaborative decision-making regarding his care as well as daily activities  Encourage regular physical exercise  Address issues that may be impacting sleep   Ex: Urology consult for frequent nighttime urination, address chronic pain that may be interfering with sleep, moving to a different room if his roommate snores loudly, make sure the room is a comfortable temperature and he has adequate pillows and blankets  Ensure he gets out into the sunlight at least once per day to encourage regular circadian cycle  No caffeine, sugar, or large meals within two hours of bedtime   Make sure room at night is dark and quiet and minimize nighttime interruptions from staff unless medically necessary   Try to help pt go " to sleep and wake up at the same time every day  Monitor closely for worsening psychiatric symptoms (insomnia, social withdrawal, deterioration of personal hygiene, hostility, confusing or nonsensical speech, paranoia, hallucinations) and intervene promptly. Assess for possible antecedents, modify environment appropriately.            SLEEP HYGIENE     Poor sleep has a negative effect on cognition. Several strategies have been shown to improve sleep:     Caffeine intake in the afternoon and evening, as well as stuffing oneself at supper, can decrease the quality of restful sleep throughout the night.   Bedtime and wake-up times should be consistent every night and morning so the body becomes used to a single routine, even on the weekends.  Engage in daily physical activity, but not 2-3 hours before bedtime.   No technology use (television, computer, iPad) 1-2 hours before bed.   Have a wind down routine (e.g., soft lights in the house, bath before bed, reduced fluid intake, songs, reading, less noise) to promote sleep readiness.   Visit the www.sleepfoundation.org for more strategies.      COGNITIVE HYGIENE     Engage in regular exercise, which increases alertness and arousal and can improve attention and focus.  Consider lower impact exercises, such as yoga or light walking.  Get a good nights sleep, as this can enhance alertness and cognition.  Eat healthy foods and balanced meals. It is notable that research indicates certain nutrients may aid in brain function, such as B vitamins (especially B6, B12, and folic acid), antioxidants (such as vitamins C and E, and beta carotene), and Omega-3 fatty acids. Talk with your physician or nutritionist about whats right for you.   Keep your brain active. Find activities to stay mentally active, such as reading, games (cards, checkers), puzzles (crosswords, Sudoku, jig saw), crafts (models, woodworking), gardening, or participating in activities in the community.  Stay  socially engaged. Continue staying active with your family and friends.      FUTURE PLANNING     The patient and caregivers should consider formal arrangements to allow a designated person to make medical and financial decisions for the pt, should he/she become unable to do so.  Options to consider include designating a healthcare proxy, medical and/or financial power of , and completing advanced directives for healthcare decisions and estate planning (e.g., finalizing a will).  If cost is prohibitive, [facility] [employer] (https://[employer]/) provides free  for individuals with low income.       ADDITIONAL RESOURCES     [facility] (www.http://alzbr.org) have good local caregiver and patient resources.   Consider resources for support through the Governors Office of Elderly Affairs (http://goea.[place].gov/), [place] Chapter of the Alzheimers Association (www.alz.org/[place]/), the Family Caregiver [place] (www.caregiver.org), and the American Psychological Association (http://www.apa.org/pi/about/publications/caregivers/consumers/index.aspxconsumers/index.aspx).  For More Information About Hallucinations, Delusions, and Paranoia in Alzheimer's TAB Alzheimer's and related Dementias Education and Referral (ADEAR) Center 1-758.531.9007 (toll-free) adear@tab.nih.gov www.tab.nih.gov/alzheimers The National [place] on [facility]'s ADEAR Center offers information and free print publications about Alzheimer's disease and related dementias for families, caregivers, and health professionals. ADEAR Center staff answer telephone, email, and written requests and make referrals to local and national resources            MEDICAL/SURGICAL COMORBIDITIES     All relevant medical comorbidities noted and managed by primary care physician and medical care team.          MISCELLANEOUS MEDICAL PROBLEMS       HEALTHY LIFESTYLE AND PREVENTATIVE CARE    Encouraged the patient  to adhere to the age-appropriate health maintenance guidelines including screening tests and vaccinations.     Discussed the overall importance of healthy lifestyle, optimal weight, exercise, healthy diet, good sleep hygiene and avoiding drugs including smoking, alcohol and recreational drugs. The patient verbalized full understanding.       Advised the patient to follow COVID-19 prevention measures.       I spent 30 minutes more than 50% spent face to face with the patient    time spent in counseling and coordination of care including discussions etiology of diagnosis, pathogenesis of diagnosis, prognosis of diagnosis,, diagnostic results, impression and recommendations, diagnostic studies, management, risks and benefits of treatment, instructions of disease self-management, treatment instructions, follow up requirements, patient and family counseling/involvement in care compliance with treatment regimen. All of the patient's questions were answered during this discussion.       Nirav Del Valle, MSN NP      Collaborating Provider: Omega Lemon MD, FAAN Neurologist/Epileptologist

## 2025-03-25 ENCOUNTER — OFFICE VISIT (OUTPATIENT)
Dept: INTERNAL MEDICINE | Facility: CLINIC | Age: 85
End: 2025-03-25
Payer: MEDICARE

## 2025-03-25 ENCOUNTER — LAB VISIT (OUTPATIENT)
Dept: LAB | Facility: HOSPITAL | Age: 85
End: 2025-03-25
Attending: FAMILY MEDICINE
Payer: MEDICARE

## 2025-03-25 VITALS
HEIGHT: 70 IN | DIASTOLIC BLOOD PRESSURE: 76 MMHG | OXYGEN SATURATION: 95 % | BODY MASS INDEX: 23.68 KG/M2 | SYSTOLIC BLOOD PRESSURE: 120 MMHG | HEART RATE: 63 BPM | WEIGHT: 165.38 LBS

## 2025-03-25 DIAGNOSIS — I10 ESSENTIAL HYPERTENSION: ICD-10-CM

## 2025-03-25 DIAGNOSIS — I70.0 AORTIC ATHEROSCLEROSIS: ICD-10-CM

## 2025-03-25 DIAGNOSIS — Z00.00 ANNUAL PHYSICAL EXAM: ICD-10-CM

## 2025-03-25 DIAGNOSIS — G30.9 MODERATE MAJOR NEUROCOGNITIVE DISORDER DUE TO ALZHEIMER'S DISEASE WITH BEHAVIORAL DISTURBANCE: ICD-10-CM

## 2025-03-25 DIAGNOSIS — Z86.39 PERSONAL HISTORY OF OTHER ENDOCRINE, NUTRITIONAL AND METABOLIC DISEASE: ICD-10-CM

## 2025-03-25 DIAGNOSIS — Z86.73 HISTORY OF CVA (CEREBROVASCULAR ACCIDENT) WITHOUT RESIDUAL DEFICITS: ICD-10-CM

## 2025-03-25 DIAGNOSIS — F02.B18 MODERATE MAJOR NEUROCOGNITIVE DISORDER DUE TO ALZHEIMER'S DISEASE WITH BEHAVIORAL DISTURBANCE: ICD-10-CM

## 2025-03-25 DIAGNOSIS — Z85.46 HISTORY OF PROSTATE CANCER: Chronic | ICD-10-CM

## 2025-03-25 DIAGNOSIS — I67.2 CEREBRAL ATHEROSCLEROSIS: ICD-10-CM

## 2025-03-25 DIAGNOSIS — Z00.00 ANNUAL PHYSICAL EXAM: Primary | ICD-10-CM

## 2025-03-25 PROCEDURE — 85027 COMPLETE CBC AUTOMATED: CPT

## 2025-03-25 PROCEDURE — 80053 COMPREHEN METABOLIC PANEL: CPT

## 2025-03-25 PROCEDURE — 84443 ASSAY THYROID STIM HORMONE: CPT

## 2025-03-25 PROCEDURE — 80061 LIPID PANEL: CPT

## 2025-03-25 PROCEDURE — 84153 ASSAY OF PSA TOTAL: CPT

## 2025-03-25 PROCEDURE — 99999 PR PBB SHADOW E&M-EST. PATIENT-LVL III: CPT | Mod: PBBFAC,,, | Performed by: FAMILY MEDICINE

## 2025-03-25 PROCEDURE — 83036 HEMOGLOBIN GLYCOSYLATED A1C: CPT

## 2025-03-25 PROCEDURE — 36415 COLL VENOUS BLD VENIPUNCTURE: CPT

## 2025-03-25 RX ORDER — METOPROLOL TARTRATE 25 MG/1
25 TABLET, FILM COATED ORAL 2 TIMES DAILY
COMMUNITY
End: 2025-03-25 | Stop reason: CLARIF

## 2025-03-25 RX ORDER — BUSPIRONE HYDROCHLORIDE 5 MG/1
10 TABLET ORAL DAILY
Start: 2025-03-25 | End: 2026-03-25

## 2025-03-25 NOTE — PROGRESS NOTES
Subjective:   Patient ID: Pravin Ferrari is a 84 y.o. male.  Chief Complaint:  Annual Exam    Presents for annual exam     Lab work last annual exam showed:  Lipid panel with LDL at goal less than 70  CBC normal white blood cell count, red blood cell count, and platelet levels  CMP with normal glucose, kidney, liver, electrolytes  A1c is in a normal range. No Prediabtes or Diabetes  Thyroid testing is normal  PSA level remains stable post prostatectomy    Medical History:  - Cerebral atherosclerosis, Aortic atherosclerosis, History of CVA (cerebrovascular accident) without residual deficits.  On aspirin 81 mg daily, Plavix 75 mg daily, and Lipitor 40 mg daily.  Last lipid panel with LDL at goal less than 70. Reports compliance with medications. Denies side effects.  No recurrent TIA or CVA symptoms or chest pain.  - Hypertension.  Controlled.  On amlodipine 5 mg daily.  Reports compliance.  Denies side effects.  Denies shortness of breath or swelling.  - Dementia with increased anxiety symptoms. Now followed by [facility].  On Aricept 10 mg daily and Namenda 10 mg twice a day.  Reports compliance.  Denies side effects.  Recently started buspirone 5 mg daily for mild-to-moderate anxiety and agitation.  Wife has not noticed any improvement in symptoms on medication.  No side effects..    - History prostate cancer.  Followed by external Urology.  Serial PSAs have remained normal/undetectable. Does complain of urinary leakage/incontinence/frequency.  Wearing adult briefs.  Worse than previous residual deficits from prostate surgery.  Unsure due to forgetting to go to the bathroom due to dementia or having significantly decreased sensation than urination as needed    Health maintenance needs include COVID booster, RSV vaccine shingles vaccine series    Overall doing well with no new complaints or concerns today    Review of Systems   Constitutional:  Negative for chills, diaphoresis, fatigue and fever.   Eyes:   "Negative for visual disturbance.   Respiratory:  Negative for cough, chest tightness and shortness of breath.    Cardiovascular:  Negative for chest pain, palpitations and leg swelling.   Gastrointestinal:  Negative for abdominal pain, constipation, diarrhea, nausea and vomiting.   Endocrine: Negative for cold intolerance and heat intolerance.   Genitourinary:  Negative for difficulty urinating.   Musculoskeletal:  Negative for myalgias and neck pain.   Skin:  Negative for rash.   Neurological:  Negative for dizziness, tremors, syncope, weakness, light-headedness, numbness and headaches.   Hematological:  Does not bruise/bleed easily.   Psychiatric/Behavioral:  Positive for agitation, confusion and decreased concentration. Negative for behavioral problems, dysphoric mood, hallucinations, self-injury, sleep disturbance and suicidal ideas. The patient is nervous/anxious. The patient is not hyperactive.      Objective:   /76 (BP Location: Left arm, Patient Position: Sitting)   Pulse 63   Ht 5' 10" (1.778 m)   Wt 75 kg (165 lb 5.5 oz)   SpO2 95%   BMI 23.72 kg/m²     Physical Exam  Vitals and nursing note reviewed.   Constitutional:       Appearance: Normal appearance. He is well-developed and normal weight.   HENT:      Right Ear: Tympanic membrane and ear canal normal. Decreased hearing noted.      Left Ear: Tympanic membrane and ear canal normal. Decreased hearing noted.      Nose: Nose normal. No congestion or rhinorrhea.      Mouth/Throat:      Pharynx: Oropharynx is clear. Uvula midline. No pharyngeal swelling, oropharyngeal exudate or posterior oropharyngeal erythema.   Eyes:      General: No scleral icterus.     Conjunctiva/sclera: Conjunctivae normal.   Neck:      Thyroid: No thyroid mass, thyromegaly or thyroid tenderness.      Vascular: Normal carotid pulses. No carotid bruit or JVD.   Cardiovascular:      Rate and Rhythm: Normal rate and regular rhythm.      Heart sounds: Normal heart sounds. No " murmur heard.  Pulmonary:      Effort: Pulmonary effort is normal.      Breath sounds: Normal breath sounds and air entry.   Abdominal:      General: There is no distension.      Palpations: Abdomen is soft.      Tenderness: There is no abdominal tenderness.   Musculoskeletal:      Right lower leg: No edema.      Left lower leg: No edema.   Skin:     Findings: Bruising and ecchymosis present. No abrasion or rash.   Neurological:      Cranial Nerves: No dysarthria or facial asymmetry.      Gait: Gait normal.   Psychiatric:         Attention and Perception: He is inattentive.         Mood and Affect: Affect normal. Mood is anxious.         Cognition and Memory: Cognition is impaired. Memory is impaired.       Assessment:       ICD-10-CM ICD-9-CM   1. Annual physical exam  Z00.00 V70.0   2. Personal history of other endocrine, nutritional and metabolic disease  Z86.39 V12.29   3. Essential hypertension  I10 401.9   4. Cerebral atherosclerosis  I67.2 437.0   5. Aortic atherosclerosis  I70.0 440.0   6. History of CVA (cerebrovascular accident) without residual deficits  Z86.73 V12.54   7. 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 History of prostate cancer  Z85.46 V10.46     Plan:   Annual physical exam  Personal history of other endocrine, nutritional and metabolic disease  -     CBC Without Differential; Future; Expected date: 03/25/2025  -     Comprehensive Metabolic Panel; Future; Expected date: 03/25/2025  -     Lipid Panel; Future; Expected date: 03/25/2025  -     TSH; Future; Expected date: 03/25/2025  -     Hemoglobin A1C; Future; Expected date: 03/25/2025  -     Prostate Specific Antigen, Diagnostic; Future; Expected date: 03/25/2025  Blood pressure normal.  BMI 24.  Overall exam unchanged   Check labs.  Treat as indicated.    Colon cancer screening discontinue based on age   Recommend COVID booster, shingles vaccine series,  and RSV vaccine through pharmacy   Tetanus booster, pneumonia vaccine, and flu vaccine up-to-date    Essential hypertension  Controlled.  Stable.  Asymptomatic.  BP at goal.    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  No significant worsening of cognitive impairment since last visit   Anxiety and agitation symptoms not adequately controlled on current medication dose   Increase BuSpar 10 mg daily dose   Follow-up neurology as scheduled     History of prostate cancer  -     Prostate Specific Antigen, Diagnostic; Future; Expected date: 03/25/2025    Return to clinic 1 year for annual exam or sooner as needed

## 2025-03-26 ENCOUNTER — RESULTS FOLLOW-UP (OUTPATIENT)
Dept: INTERNAL MEDICINE | Facility: CLINIC | Age: 85
End: 2025-03-26

## 2025-03-26 LAB
ALBUMIN SERPL BCP-MCNC: 3.8 G/DL (ref 3.5–5.2)
ALP SERPL-CCNC: 80 UNIT/L (ref 40–150)
ALT SERPL W/O P-5'-P-CCNC: 19 UNIT/L (ref 10–44)
ANION GAP (OHS): 10 MMOL/L (ref 8–16)
AST SERPL-CCNC: 24 UNIT/L (ref 11–45)
BILIRUB SERPL-MCNC: 0.8 MG/DL (ref 0.1–1)
BUN SERPL-MCNC: 18 MG/DL (ref 8–23)
CALCIUM SERPL-MCNC: 9.3 MG/DL (ref 8.7–10.5)
CHLORIDE SERPL-SCNC: 106 MMOL/L (ref 95–110)
CHOLEST SERPL-MCNC: 108 MG/DL (ref 120–199)
CHOLEST/HDLC SERPL: 2 {RATIO} (ref 2–5)
CO2 SERPL-SCNC: 26 MMOL/L (ref 23–29)
CREAT SERPL-MCNC: 1 MG/DL (ref 0.5–1.4)
EAG (OHS): 117 MG/DL (ref 68–131)
ERYTHROCYTE [DISTWIDTH] IN BLOOD BY AUTOMATED COUNT: 13 % (ref 11.5–14.5)
GFR SERPLBLD CREATININE-BSD FMLA CKD-EPI: >60 ML/MIN/1.73/M2
GLUCOSE SERPL-MCNC: 83 MG/DL (ref 70–110)
HBA1C MFR BLD: 5.7 % (ref 4–5.6)
HCT VFR BLD AUTO: 47.1 % (ref 40–54)
HDLC SERPL-MCNC: 54 MG/DL (ref 40–75)
HDLC SERPL: 50 % (ref 20–50)
HGB BLD-MCNC: 15 GM/DL (ref 14–18)
LDLC SERPL CALC-MCNC: 44.2 MG/DL (ref 63–159)
MCH RBC QN AUTO: 29.5 PG (ref 27–50)
MCHC RBC AUTO-ENTMCNC: 31.8 G/DL (ref 32–36)
MCV RBC AUTO: 93 FL (ref 82–98)
NONHDLC SERPL-MCNC: 54 MG/DL
PLATELET # BLD AUTO: 238 K/UL (ref 150–450)
PMV BLD AUTO: 11.4 FL (ref 9.2–12.9)
POTASSIUM SERPL-SCNC: 4.9 MMOL/L (ref 3.5–5.1)
PROT SERPL-MCNC: 6.8 GM/DL (ref 6–8.4)
PSA SERPL-MCNC: <0.01 NG/ML
RBC # BLD AUTO: 5.09 M/UL (ref 4.6–6.2)
SODIUM SERPL-SCNC: 142 MMOL/L (ref 136–145)
TRIGL SERPL-MCNC: 49 MG/DL (ref 30–150)
TSH SERPL-ACNC: 1.42 UIU/ML (ref 0.4–4)
WBC # BLD AUTO: 8.71 K/UL (ref 3.9–12.7)

## 2025-04-09 ENCOUNTER — OFFICE VISIT (OUTPATIENT)
Dept: NEUROLOGY | Facility: CLINIC | Age: 85
End: 2025-04-09
Payer: MEDICARE

## 2025-04-09 VITALS
SYSTOLIC BLOOD PRESSURE: 116 MMHG | HEART RATE: 70 BPM | DIASTOLIC BLOOD PRESSURE: 73 MMHG | BODY MASS INDEX: 23.54 KG/M2 | WEIGHT: 164.44 LBS | HEIGHT: 70 IN | RESPIRATION RATE: 16 BRPM

## 2025-04-09 DIAGNOSIS — T46.4X5A ANGIOEDEMA DUE TO ANGIOTENSIN CONVERTING ENZYME INHIBITOR (ACE-I): ICD-10-CM

## 2025-04-09 DIAGNOSIS — F03.A0 MILD DEMENTIA WITHOUT BEHAVIORAL DISTURBANCE, PSYCHOTIC DISTURBANCE, MOOD DISTURBANCE, OR ANXIETY, UNSPECIFIED DEMENTIA TYPE: Chronic | ICD-10-CM

## 2025-04-09 DIAGNOSIS — F02.B18 MODERATE MAJOR NEUROCOGNITIVE DISORDER DUE TO MULTIPLE ETIOLOGIES WITH BEHAVIORAL DISTURBANCE: ICD-10-CM

## 2025-04-09 DIAGNOSIS — R41.3 MEMORY DIFFICULTIES: ICD-10-CM

## 2025-04-09 DIAGNOSIS — F02.B18 MODERATE MAJOR NEUROCOGNITIVE DISORDER DUE TO ALZHEIMER'S DISEASE WITH BEHAVIORAL DISTURBANCE: Primary | ICD-10-CM

## 2025-04-09 DIAGNOSIS — Z85.46 HISTORY OF PROSTATE CANCER: ICD-10-CM

## 2025-04-09 DIAGNOSIS — I67.2 CEREBRAL ATHEROSCLEROSIS: Chronic | ICD-10-CM

## 2025-04-09 DIAGNOSIS — I10 ESSENTIAL HYPERTENSION: ICD-10-CM

## 2025-04-09 DIAGNOSIS — R13.10 DYSPHAGIA, UNSPECIFIED TYPE: ICD-10-CM

## 2025-04-09 DIAGNOSIS — I70.0 AORTIC ATHEROSCLEROSIS: ICD-10-CM

## 2025-04-09 DIAGNOSIS — R13.13 PHARYNGEAL DYSPHAGIA: ICD-10-CM

## 2025-04-09 DIAGNOSIS — Z86.73 HISTORY OF CVA (CEREBROVASCULAR ACCIDENT) WITHOUT RESIDUAL DEFICITS: ICD-10-CM

## 2025-04-09 DIAGNOSIS — G30.9 MODERATE MAJOR NEUROCOGNITIVE DISORDER DUE TO ALZHEIMER'S DISEASE WITH BEHAVIORAL DISTURBANCE: Primary | ICD-10-CM

## 2025-04-09 DIAGNOSIS — T78.3XXA ANGIOEDEMA DUE TO ANGIOTENSIN CONVERTING ENZYME INHIBITOR (ACE-I): ICD-10-CM

## 2025-04-09 DIAGNOSIS — R41.3 OTHER AMNESIA: ICD-10-CM

## 2025-04-09 PROCEDURE — 99999 PR PBB SHADOW E&M-EST. PATIENT-LVL III: CPT | Mod: PBBFAC,,, | Performed by: NURSE PRACTITIONER

## 2025-04-09 RX ORDER — MEMANTINE HYDROCHLORIDE 10 MG/1
10 TABLET ORAL 2 TIMES DAILY
Qty: 180 TABLET | Refills: 3 | Status: SHIPPED | OUTPATIENT
Start: 2025-04-09 | End: 2026-04-09

## 2025-04-09 RX ORDER — BUSPIRONE HYDROCHLORIDE 5 MG/1
5 TABLET ORAL 3 TIMES DAILY
Qty: 90 TABLET | Refills: 11 | Status: SHIPPED | OUTPATIENT
Start: 2025-04-09 | End: 2026-04-09

## 2025-04-09 RX ORDER — DONEPEZIL HYDROCHLORIDE 10 MG/1
10 TABLET, FILM COATED ORAL NIGHTLY
Qty: 90 TABLET | Refills: 3 | Status: SHIPPED | OUTPATIENT
Start: 2025-04-09 | End: 2026-04-09

## 2025-04-09 NOTE — PROGRESS NOTES
Subjective:       Patient ID: Pravin Ferrari is a 84 y.o. male.    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      The patient is presenting with memory loss that began approximately in 2019. The patient is accompanied by spouse and sister. The main problems the patient has are related to progressive short term memory loss. In 2022 the patient memory worsened and the patient was evaluated at [facility] and diagnosed with dementia. Wife gave many examples, the patient would repeat the same question repeatedly. The patient sometimes look at pictures of  grand kids and unable to recall who they are or unable to recall their names. The patient is no longer driving stopped driving in 2020, he began to get lost.  The patient is not losing personal items and does not put them in odd places. No confusion around and inside the house. Patient has trouble remembering the date and the day weeks. Patient spouse manages medications and appointments Patient able to recall major holidays. Patient is unaware of political changes. Patient wife handling finances. The patient is still independent with ADLs, able to give his own bath, dress hisself, wife assist with clothes selection. Has some mild agitation. No hallucinations or delusions. No seizures. No problems handling tools. History of multiple strokes MRI Brain WO 01- Left basal ganglia Small focus of acute or subacute ischemia left basal ganglia/posterior limb internal capsule. Patient had speech changes, slurred speech, Right sided weakness and numbness deficit.  Stroke risk factor HTN, HLD, Takes ASA 81 mg po daily and Plavix 75 mg po daily.   No history of headaches. No history of hypothyroidism. No history of alcoholism.  No history of B12 deficiency. No history of depression. No history of bipolar disorder. No history of Untreated Syphilis.  No history of HIV infection. No  toxic exposures.  No history of traumatic brain injury. Mild bilateral tremors. No falls, unsteady wide based gait. . No urinary incontinence. No change in sleep and appetite good. No family history of dementia. Takes Aricept 10 mg po Night and Namenda 10 mg BID tolerating well, no side effects. Discussed Dr. Richards CNP results 06- CNP RESULTS Moderate major neurocognitive disorder due to multiple etiologies with behavioral disturbanceS, Alzheimer's disease, Cerebrovascular disease, HISTORY OF CVA with residual deficits, Cerebral atherosclerosis        INTERVAL HISTORY 04-: Patient present for follow up for memory management. Patient accompanied by spouse and granddaughter. Patient doing well. Tolerating Aricept 10 mg po night and Namenda 10 mg BID tolerating well, no side effects. Some cognitive decline changes. Tolerating Buspar 5 mg po Daily, wife reports treatment it is helpful but in the afternoon the anxiousness returns. Patient occasionally has agitation but able to redirect. I will change Buspar 5 mg po TID. Discussed plan of care. Patient and spouse agrees to treatment plan.             Review of Systems   Unable to perform ROS: Dementia   Constitutional: Negative.    HENT:  Positive for trouble swallowing.    Eyes: Negative.    Respiratory: Negative.     Gastrointestinal: Negative.    Endocrine: Negative.    Genitourinary: Negative.    Musculoskeletal:  Positive for arthralgias and gait problem.   Skin: Negative.    Allergic/Immunologic: Negative.    Neurological:  Positive for numbness.   Hematological: Negative.    Psychiatric/Behavioral:  Positive for agitation, confusion, decreased concentration and dysphoric mood. Negative for behavioral problems, hallucinations, self-injury, sleep disturbance and suicidal ideas. The patient is not nervous/anxious and is not hyperactive.                  Current Outpatient Medications:     amLODIPine (NORVASC) 5 MG tablet, TAKE 1 TABLET(5 MG) BY MOUTH EVERY  DAY, Disp: 90 tablet, Rfl: 3    aspirin (ECOTRIN) 81 MG EC tablet, Take 81 mg by mouth once daily., Disp: , Rfl:     atorvastatin (LIPITOR) 40 MG tablet, Take 1 tablet (40 mg total) by mouth once daily., Disp: 90 tablet, Rfl: 3    clopidogreL (PLAVIX) 75 mg tablet, Take 1 tablet (75 mg total) by mouth once daily., Disp: 90 tablet, Rfl: 3    multivitamin (THERAGRAN) per tablet, Take 1 tablet by mouth once daily., Disp: , Rfl:     busPIRone (BUSPAR) 5 MG Tab, Take 1 tablet (5 mg total) by mouth 3 (three) times daily., Disp: 90 tablet, Rfl: 11    donepeziL (ARICEPT) 10 MG tablet, Take 1 tablet (10 mg total) by mouth nightly., Disp: 90 tablet, Rfl: 3    memantine (NAMENDA) 10 MG Tab, Take 1 tablet (10 mg total) by mouth 2 (two) times a day., Disp: 180 tablet, Rfl: 3  Past Medical History:   Diagnosis Date    Acute CVA (cerebrovascular accident) 1/11/2023    Cancer     prostate    Hughes (hard of hearing)     bilateral ears    Hypertension     pt states no     Past Surgical History:   Procedure Laterality Date    COLONOSCOPY N/A 3/19/2019    Procedure: COLONOSCOPY;  Surgeon: Korin Cavazos MD;  Location: [facility];  Service: Endoscopy;  Laterality: N/A;    EYE SURGERY      HERNIA REPAIR      PROSTATE SURGERY       Social History     Socioeconomic History    Marital status:    Tobacco Use    Smoking status: Never    Smokeless tobacco: Never   Substance and Sexual Activity    Alcohol use: Not Currently    Drug use: No    Sexual activity: Not Currently             Past/Current Medical/Surgical History, Past/Current Social History, Past/Current Family History and Past/Current Medications were reviewed in detail.        Objective:           VITAL SIGNS WERE REVIEWED      GENERAL APPEARANCE:     The patient looks comfortable.    BMI 22.10 KG     No signs of respiratory distress.    Normal breathing pattern.    No dysmorphic features    Normal eye contact.     GENERAL MEDICAL EXAM:    HEENT:  Head is atraumatic  normocephalic.     No tender temporal arteries. Fundoscopic (Ophthalmoscopic) exam showed no disc edema.      Neck and Axillae: No JVD. No visible lesions.    No carotid bruits. No thyromegaly. No lymphadenopathy.    Cardiopulmonary: No cyanosis. No tachypnea. Normal respiratory effort.    Gastrointestinal/Urogenital:  No jaundice. No stomas or lesions. No visible hernias. No catheters.     Abdomen is soft non-tender. No masses or organomegaly.    Skin, Hair and Nails: No pathognonomic skin rash. No neurofibromatosis. No visible lesions.No stigmata of autoimmune disease. No clubbing.    Skin is warm and moist. No palpable masses.    Limbs: No varicose veins. No visible swelling.    No palpable edema. Pulses are symmetric. Pedal pulses are palpable.      Muskoskeletal: No visible deformities.No visible lesions.    No spine tenderness. No signs of longstanding neuropathy. No dislocations or fractures.            Neurologic Exam     Mental Status   Oriented to person, place, and time.   Follows 3 step commands.   Attention: decreased. Concentration: decreased.   Speech: speech is normal and not slurred   Level of consciousness: alert  Knowledge: consistent with education and poor. Able to perform simple calculations.   Able to name object. Able to read. Able to repeat. Able to write. Abnormal comprehension.     Cranial Nerves     CN II   Visual fields full to confrontation.   Visual acuity: normal  Right visual field deficit: none  Left visual field deficit: none     CN III, IV, VI   Pupils are equal, round, and reactive to light.  Extraocular motions are normal.   Right pupil: Size: 2 mm. Shape: regular. Reactivity: brisk. Consensual response: intact. Accommodation: intact.   Left pupil: Size: 2 mm. Shape: regular. Reactivity: brisk. Consensual response: intact. Accommodation: intact.   CN III: no CN III palsy  CN VI: no CN VI palsy  Nystagmus: none   Diplopia: none  Ophthalmoparesis: none  Upgaze: normal  Downgaze:  normal  Conjugate gaze: present  Vestibulo-ocular reflex: present    CN V   Facial sensation intact.   Right facial sensation deficit: none  Left facial sensation deficit: none    CN VII   Left facial weakness: none  Left taste: normal    CN VIII   CN VIII normal.   Hearing: intact  Right Rinne: AC > BC  Left Rinne: AC > BC    CN IX, X   CN IX normal.   CN X normal.   Palate: symmetric    CN XI   CN XI normal.   Right sternocleidomastoid strength: normal  Left sternocleidomastoid strength: normal  Right trapezius strength: normal  Left trapezius strength: normal    CN XII   CN XII normal.   Tongue: not atrophic  Fasciculations: absent  Tongue deviation: none    Motor Exam   Muscle bulk: normal  Overall muscle tone: normal  Right arm tone: normal  Left arm tone: normal  Right arm pronator drift: absent  Left arm pronator drift: absent  Right leg tone: normal  Left leg tone: normal    Strength   Strength 5/5 throughout.   Right neck flexion: 5/5  Left neck flexion: 5/5  Right neck extension: 5/5  Left neck extension: 5/5  Right deltoid: 5/5  Left deltoid: 5/5  Right biceps: 5/5  Left biceps: 5/5  Right triceps: 5/5  Left triceps: 5/5  Right wrist flexion: 5/5  Left wrist flexion: 5/5  Right wrist extension: 5/5  Left wrist extension: 5/5  Right interossei: 5/5  Left interossei: 5/5  Right iliopsoas: 5/5  Left iliopsoas: 5/5  Right quadriceps: 5/5  Left quadriceps: 5/5  Right hamstrin/5  Left hamstrin/5  Right glutei: 5/5  Left glutei: 5/5  Right anterior tibial: 5/5  Left anterior tibial: 5/5  Right posterior tibial: 5/5  Left posterior tibial: 5/5  Right peroneal: 5/5  Left peroneal: 5/5  Right gastroc: 5/5  Left gastroc: 5/5    Sensory Exam   Light touch normal.   Right arm light touch: normal  Left arm light touch: normal  Right leg light touch: normal  Left leg light touch: normal  Vibration normal.   Right arm vibration: normal  Left arm vibration: normal  Right leg vibration: normal  Left leg vibration:  normal  Proprioception normal.   Right arm proprioception: normal  Left arm proprioception: normal  Right leg proprioception: normal  Left leg proprioception: normal  Right arm pinprick: normal  Left arm pinprick: normal  Right leg pinprick: decreased from toes  Left leg pinprick: decreased from toes  Sensory deficit distribution on left: lateral cutaneous thigh  Graphesthesia: normal  Stereognosis: normal    Gait, Coordination, and Reflexes     Gait  Gait: wide-based    Coordination   Romberg: negative  Finger to nose coordination: normal  Heel to shin coordination: normal  Tandem walking coordination: normal    Tremor   Resting tremor: absent  Intention tremor: absent  Action tremor: right arm    Reflexes   Right brachioradialis: 2+  Left brachioradialis: 2+  Right biceps: 2+  Left biceps: 2+  Right triceps: 2+  Left triceps: 2+  Right patellar: 2+  Left patellar: 2+  Right achilles: 0  Left achilles: 0  Right plantar: normal  Left plantar: normal  Right Pulido: absent  Left Pulido: absent  Right ankle clonus: absent  Left ankle clonus: absent  Right pendular knee jerk: absent  Left pendular knee jerk: absent      Mild postural tremors    Poor posture            FRIDA COGNITIVE ASSESSMENT (MOCA) TOTAL SCORE       MODERATE DEMENTIA 10-19    SEVERE DEMENTIA <10    12 grade HS     DATE 01- 04-      TOTAL SCORE 18/30 14/30      VISUOSPATIAL EXECUTIVE (5) 3 3      NAMING (3) 3 3      ATTENTION (6) 6 6      LANGUAGE (3) 3 0      ABSTRACTION(2) 1 0      RECALL (5) 0 0      ORIENTATION (6) 2 2          Lab Results   Component Value Date    WBC 8.71 03/25/2025    HGB 15.0 03/25/2025    HCT 47.1 03/25/2025    MCV 93 03/25/2025     03/25/2025     Sodium   Date Value Ref Range Status   03/25/2025 142 136 - 145 mmol/L Final   10/24/2024 143 136 - 145 mmol/L Final     Potassium   Date Value Ref Range Status   03/25/2025 4.9 3.5 - 5.1 mmol/L Final   10/24/2024 4.4 3.5 - 5.1 mmol/L Final     Chloride    Date Value Ref Range Status   03/25/2025 106 95 - 110 mmol/L Final   10/24/2024 108 95 - 110 mmol/L Final     CO2   Date Value Ref Range Status   03/25/2025 26 23 - 29 mmol/L Final   10/24/2024 28 23 - 29 mmol/L Final     Glucose   Date Value Ref Range Status   10/24/2024 97 70 - 110 mg/dL Final     BUN   Date Value Ref Range Status   03/25/2025 18 8 - 23 mg/dL Final     Creatinine   Date Value Ref Range Status   03/25/2025 1.0 0.5 - 1.4 mg/dL Final     Calcium   Date Value Ref Range Status   03/25/2025 9.3 8.7 - 10.5 mg/dL Final   10/24/2024 9.5 8.7 - 10.5 mg/dL Final     Total Protein   Date Value Ref Range Status   10/24/2024 7.2 6.0 - 8.4 g/dL Final     Albumin   Date Value Ref Range Status   03/25/2025 3.8 3.5 - 5.2 g/dL Final   10/24/2024 3.6 3.5 - 5.2 g/dL Final     Total Bilirubin   Date Value Ref Range Status   10/24/2024 0.8 0.1 - 1.0 mg/dL Final     Comment:     For infants and newborns, interpretation of results should be based  on gestational age, weight and in agreement with clinical  observations.    Premature Infant recommended reference ranges:  Up to 24 hours.............<8.0 mg/dL  Up to 48 hours............<12.0 mg/dL  3-5 days..................<15.0 mg/dL  6-29 days.................<15.0 mg/dL       Bilirubin Total   Date Value Ref Range Status   03/25/2025 0.8 0.1 - 1.0 mg/dL Final     Comment:     For infants and newborns, interpretation of results should be based   on gestational age, weight and in agreement with clinical   observations.    Premature Infant recommended reference ranges:   0-24 hours:  <8.0 mg/dL   24-48 hours: <12.0 mg/dL   3-5 days:    <15.0 mg/dL   6-29 days:   <15.0 mg/dL     Alkaline Phosphatase   Date Value Ref Range Status   10/24/2024 78 40 - 150 U/L Final     ALP   Date Value Ref Range Status   03/25/2025 80 40 - 150 unit/L Final     AST   Date Value Ref Range Status   03/25/2025 24 11 - 45 unit/L Final   10/24/2024 28 10 - 40 U/L Final     ALT   Date Value Ref  Range Status   03/25/2025 19 10 - 44 unit/L Final   10/24/2024 24 10 - 44 U/L Final     Anion Gap   Date Value Ref Range Status   03/25/2025 10 8 - 16 mmol/L Final     eGFR if    Date Value Ref Range Status   01/12/2022 >60 >60 mL/min/1.73 m^2 Final     eGFR if non    Date Value Ref Range Status   01/12/2022 >60 >60 mL/min/1.73 m^2 Final     Comment:     Calculation used to obtain the estimated glomerular filtration  rate (eGFR) is the CKD-EPI equation.        Lab Results   Component Value Date    [id number] 360 12/06/2021     Lab Results   Component Value Date    TSH 1.416 03/25/2025       EKG     03-    QT interval 410 HR 61         RADIOLOGY EVALUATION:    MRI BRAIN WO:    02-    No acute abnormality.  Moderately advanced atrophy and white matter degeneration.     Chronic left basal ganglia lacunar infarct with associated white matter degeneration extending into the left cerebral peduncle.     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      MRI showed multiple old strokes and moderate atrophy could been seen in patients with Vascular dementia . No acute findings  01-    MRI Brain WO     Small focus of acute or subacute ischemia left basal ganglia/posterior limb internal capsule.         02-    MRI Brain WO     Negative MRI Brain. Findings felt consistent with marked atrophy, microvascular change and previouse small vessel insults.     06-    CNP RESULTS      Moderate major neurocognitive disorder due to multiple etiologies with behavioral disturbance   Alzheimer's disease  Cerebrovascular disease    HISTORY OF CVA with residual deficits   Cerebral atherosclerosis    No results found in the last 24 hours.      Reviewed the neuroimaging independently       Assessment:       1. 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 Memory difficulties    3. Other amnesia    4. History  of CVA (cerebrovascular accident) without residual deficits    5. Mild dementia without behavioral disturbance, psychotic disturbance, mood disturbance, or anxiety, unspecified dementia type    6. Cerebral atherosclerosis    7. Essential hypertension    8. Angioedema due to angiotensin converting enzyme inhibitor (ACE-I)    9. Aortic atherosclerosis    10. Moderate major neurocognitive disorder due to multiple etiologies with behavioral disturbance    11. History of prostate cancer    12. Dysphagia, unspecified type    13. Pharyngeal dysphagia              Plan:         MODERATE MAJOR NEUROCOGNITIVE DISORDER DUE TO VASCULAR DEMENTIA VS ALZHEIMER'S DISEASE WITH BEHAVIORAL DISTURBANCE/ FORGETFULNESS/ AGITATION/ HX OF STROKE/ SWALLOWING DIFFICULTY         EVALUATION       No longer  driving.       DISEASE-MODIFYING AGENTS     Explained to the patient and family that medications are intended to slow down the progression (in the early stages of the disease) and not stop it or reverse the disease. The disease is relentless, progressive and so far, cannot be controlled. Progression includes Cognitive decline, Behavioral disturbances, and Motor decline as well.     I counseled the patient and family that the rate of progression is extremely variable, and the average (10 years) is an inaccurate measure.     CLASSES:    NMDA RECEPTOR ANTAGONISTS    Continue Namenda 10 mg BID.       CHOLINESTERASE INHIBITORS (CEI)    Continue Aricept 10 mg po HS     ANTI-AMYLOID MONOCLONAL ANTIBODIES (SPECIFICALLY FOR ALZHEIMER-TYPE DEMENTIA)      Aducanumab (Aduhelm), Lecanemab (Leqembi) and Donanemab. The actual cognitive benefits remain unsubstantiated and the risks (including death) may outweigh the benefit.The method of administration and cost remain prohibitive as well.  Will consider such options after high quality studies and post marketing experiencing demonstrate significant clinical efficacy and safety.         MISCELLANEOUS      [facility] study regarding Sildenafil (Viagra) impact on dementia showed possible benefit. Will wait for high quality prospective double-blinded placebo-controlled trials to make any proper recommendations.     Recommended against the use of OTC non-FDA evaluated supplements and claims.         SYMPTOMATIC MANAGEMENT-BEHAVIORAL SYMPTOMS AND NON-COGNITIVE SYMPTOMS     Increase Buspar 5 mg po Daily TID     ANTIDEPRESSANTS CLASS MEDICATIONS    FUTURE CONSIDERATION:    Trazodone 50 mg QHS to help with insomnia. Instructed the family to watch for excessive sedation or paradoxical agitation. Other options includes Mirtazapine (Remeron).    Sertraline (Zoloft) titration to 100 mg in the morning can mitigate anxiety symptoms.     Bupropion (Wellbutrin) small dose titration  mg in the morning helps with attention and concentration in addition to depression and cravings.    ANTIEPILEPTIC CLASS MEDICATIONS     Lamotrigine (Lamictal) LTG 25 mg BID to help for agitation and mood stabilization and antiepileptic effect. Other options include: Valproic Acid (Depakote) VPA.      ANTIPSYCHOTIC AND NEUROLEPTIC CLASS MEDICATIONS     Risperdal 1 mg one hour before sunset to assist with sundowning , psychotic symptoms and behavioral disturbances. Other options include: Aripiprazole (Abilify),  Quetiapine (Seroquel) and  Brexipiprazole (Rixulti).          HOME CARE     Falling Down Precautions. Gait is affected by the disease as well.     Avoid driving and access to firearms     Help with finances and decision making.    Join support group.    Proofing the house and use labeling.    Avoid antihistamines and anticholinergics.    Avoid changing routine.    Use written reminders.    Avoid multitasking.    Healthy diet, exercise (physical and cognitive).    Good sleep hygiene.        HERE ARE 10 WAYS TO REDUCE RISK OF DEMENTIA AND SLOW DOWN THE PROGRESSION OF DEMENTIA        1.Be physically active.    2. Avoid smoking and  alcohol consumption.    3. Track your numbers. Keep your blood pressure, cholesterol, blood sugar and weight within recommended ranges.    4. Stay socially connected.    5. Make healthy food choices. Eat a well-balance and healthy diet that rich in cereals, fish, legumes, and vegetables.    6. Reduce stress.     7. Challenge your brain by trying something new, playing games, or learning a new language.    8. Take care of your hearing. Avoid being continuously exposed to loud sounds and wear a hearing aid if hearing does become a problem.    9. Lower risk of falls. Consider installing handrails on all stairs and grab bars in bathrooms.    10. Reduce your exposure to air pollution, such as exposure to exhaust in heavy traffic.         CAREGIVERS COUNSELING     Recommend reading the following books:     The 36-Hour Day and there are many online and printed resources to help caregivers as well.     Alzheimer's Through the Stages.     Dementia with G.R.A.C.E.            PREVENTION OF DELIRIUM       1. Good hydration and avoid electrolyte imbalance  2. Recognize and treat infections immediately especially UTI.  3. Bladder emptying and prevent constipation.   4. Provide stimulating activities and familiar objects  5. Use eyeglasses and hearing aids if needed.   6. Use simple and regular communication about people, current place, and time  7. Mobility and range-of-motion exercises  8. Reduce noise, lighting and avoid sleep interruptions  9. Non-narcotic pain management.  10.Nondrug treatment for sleep problems or anxiety  11. Avoid antihistamines.  12. Avoid narcotics.  13. Avoid benzodiazepines.            HELPFUL STRATEGIES FOR THE FAMILY AND CAREGIVERS        BEHAVIORAL MANAGEMENT STRATEGIES     Remember that you cannot reason with acute psychosis or confusion. Unless there is an immediate safety issue, there is no need to challenge or correct the person.  For example, if a pt is hallucinating, do not argue with the  "person that what they are seeing is not real. If they are expressing paranoia, empathize gently with their fear, and attempt to redirect them to a different activity.   If the person is particularly stuck on a topic or activity, as long as the activity is safe and is not worsening their agitation, you don't need to intervene.     Communicate calmly, simply, and concisely    Reassure the person that they are safe and you are here to help  Try not to express irritation or anger  Speak quietly and calmly, do not shout or threaten the person. Avoid provocation.   Establish verbal contact and provide orientation and reassurance.  Attempt to identify the patient's wants and feelings, listen to what they are saying, and reflect those wants and feelings back to the patient.  Dont use sarcasm as a weapon    Set clear limits on behavior in a calm voice ("You cannot hit the nurse.")  Offer choices and optimism ("Which toothpaste would you like to use today?")    Redirect the person to an alternative behavior ("Can you come help me with this?)    Avoid saying things like, "We already went over this!" "You can't keep doing this." "I already explained this to you"  Instead, simply nod calmly and acknowledge what the person is saying ("Yes, that makes sense."), and then request their help or company in a different behavior.   Having a mental list of activities the patient enjoys can be helpful with redirection. For example, do they enjoy going for walks? Eating a piece of candy?   If redirection becomes challenging, you can place objects that your family member enjoys strategically in the home in order to use for distraction. This is particularly useful if there are items that they often talk about or frequently ask you questions about; or if they are visually striking. Once you focus the person on this object, talk about it briefly until they are calm and then attempt to redirect to a new behavior.   Sometimes activities which " "are repetitive can be calming, particularly if there is a comforting sensory element. For example, pt may enjoy folding soft towels or laundry.    Limit overstimulation    Decrease distractions by turning off the TV, computer, any fluorescent lights that hum, etc.  Ask any casual visitors to leave--the fewer people the better  If the person is very agitated, avoid touching them, and respect their personal space  Sit down and ask the person to sit down also     PREVENTATIVE STRATEGIES     Try to help the patient develop a routine and stick to it  Address all immediate safety issues  Does the person still have access to the car and keys? Take the keys away, or disconnect the car battery.  Are they wandering at night? Install locks on the outside doors. A keypad lock works well. Alarms on bedroom doors can also be helpful.   Are they turning on the stove and risking a fire? If you cannot limit their access to the kitchen, you may need to unplug dangerous devices any time you are not in the room.   If the person is exhibiting poor judgment throughout the day, they likely need someone supervising them at all times.   Do they still have access to significant financial assets? Limit their access to accounts, and consult with a  if necessary.   Identify situations that seem to trigger agitation or a problematic behavior, and modify the person's environment to minimize or eliminate that trigger.   For example, is their behavior worse if they don't get a good night's sleep? Or if they don't eat or drink enough? If so, prioritize those activities and build behavior plans to support them.  Do they get upset about not being allowed to answer the phone when it rings? Put all of the phones in the house on "silent."   Do they become paranoid that certain family members are trying to take advantage of them? Limit contact with the targeted family member as much as possible, and re-introduce them slowly after some time has " passed.   Encourage independence in daily living whenever safely possible  Encourage collaborative decision-making regarding his care as well as daily activities  Encourage regular physical exercise  Address issues that may be impacting sleep   Ex: Urology consult for frequent nighttime urination, address chronic pain that may be interfering with sleep, moving to a different room if his roommate snores loudly, make sure the room is a comfortable temperature and he has adequate pillows and blankets  Ensure he gets out into the sunlight at least once per day to encourage regular circadian cycle  No caffeine, sugar, or large meals within two hours of bedtime   Make sure room at night is dark and quiet and minimize nighttime interruptions from staff unless medically necessary   Try to help pt go to sleep and wake up at the same time every day  Monitor closely for worsening psychiatric symptoms (insomnia, social withdrawal, deterioration of personal hygiene, hostility, confusing or nonsensical speech, paranoia, hallucinations) and intervene promptly. Assess for possible antecedents, modify environment appropriately.            SLEEP HYGIENE     Poor sleep has a negative effect on cognition. Several strategies have been shown to improve sleep:     Caffeine intake in the afternoon and evening, as well as stuffing oneself at supper, can decrease the quality of restful sleep throughout the night.   Bedtime and wake-up times should be consistent every night and morning so the body becomes used to a single routine, even on the weekends.  Engage in daily physical activity, but not 2-3 hours before bedtime.   No technology use (television, computer, iPad) 1-2 hours before bed.   Have a wind down routine (e.g., soft lights in the house, bath before bed, reduced fluid intake, songs, reading, less noise) to promote sleep readiness.   Visit the www.sleepfoundation.org for more strategies.      COGNITIVE HYGIENE     Engage in  regular exercise, which increases alertness and arousal and can improve attention and focus.  Consider lower impact exercises, such as yoga or light walking.  Get a good nights sleep, as this can enhance alertness and cognition.  Eat healthy foods and balanced meals. It is notable that research indicates certain nutrients may aid in brain function, such as B vitamins (especially B6, B12, and folic acid), antioxidants (such as vitamins C and E, and beta carotene), and Omega-3 fatty acids. Talk with your physician or nutritionist about whats right for you.   Keep your brain active. Find activities to stay mentally active, such as reading, games (cards, checkers), puzzles (crosswords, Sudoku, jig saw), crafts (models, woodworking), gardening, or participating in activities in the community.  Stay socially engaged. Continue staying active with your family and friends.      FUTURE PLANNING     The patient and caregivers should consider formal arrangements to allow a designated person to make medical and financial decisions for the pt, should he/she become unable to do so.  Options to consider include designating a healthcare proxy, medical and/or financial power of , and completing advanced directives for healthcare decisions and estate planning (e.g., finalizing a will).  If cost is prohibitive, [facility] Legal Services (https://[employer].org/) provides free  for individuals with low income.       ADDITIONAL RESOURCES     [facility] (www.http://alzbr.org) have good local caregiver and patient resources.   Consider resources for support through the Governors Office of Elderly Affairs (http://goea.[place].gov/), [place] Chapter of the Alzheimers Association (www.alz.org/[place]/), the Family Caregiver [place] (www.caregiver.org), and the American Psychological Association  (http://www.apa.org/pi/about/publications/caregivers/consumers/index.aspxconsumers/index.aspx).  For More Information About Hallucinations, Delusions, and Paranoia in Alzheimer's TAB Alzheimer's and related Dementias Education and Referral (ADEAR) Center 1-295.326.9823 (toll-free) adear@tab.nih.gov www.tab.nih.gov/alzheimers The National [place] [facility]'s ADEAR Center offers information and free print publications about Alzheimer's disease and related dementias for families, caregivers, and health professionals. ADEAR Center staff answer telephone, email, and written requests and make referrals to local and national resources            MEDICAL/SURGICAL COMORBIDITIES     All relevant medical comorbidities noted and managed by primary care physician and medical care team.          MISCELLANEOUS MEDICAL PROBLEMS       HEALTHY LIFESTYLE AND PREVENTATIVE CARE    Encouraged the patient to adhere to the age-appropriate health maintenance guidelines including screening tests and vaccinations.     Discussed the overall importance of healthy lifestyle, optimal weight, exercise, healthy diet, good sleep hygiene and avoiding drugs including smoking, alcohol and recreational drugs. The patient verbalized full understanding.       Advised the patient to follow COVID-19 prevention measures.       I spent 30 minutes more than 50% spent face to face with the patient    time spent in counseling and coordination of care including discussions etiology of diagnosis, pathogenesis of diagnosis, prognosis of diagnosis,, diagnostic results, impression and recommendations, diagnostic studies, management, risks and benefits of treatment, instructions of disease self-management, treatment instructions, follow up requirements, patient and family counseling/involvement in care compliance with treatment regimen. All of the patient's questions were answered during this discussion.       Nirav Del Valle, MSN NP      Collaborating Provider: Omega  MD Ion, FAAN Neurologist/Epileptologist

## 2025-04-14 ENCOUNTER — OFFICE VISIT (OUTPATIENT)
Dept: INTERNAL MEDICINE | Facility: CLINIC | Age: 85
End: 2025-04-14
Payer: MEDICARE

## 2025-04-14 VITALS
OXYGEN SATURATION: 100 % | TEMPERATURE: 99 F | BODY MASS INDEX: 23.44 KG/M2 | RESPIRATION RATE: 18 BRPM | SYSTOLIC BLOOD PRESSURE: 122 MMHG | WEIGHT: 163.38 LBS | DIASTOLIC BLOOD PRESSURE: 64 MMHG | HEART RATE: 78 BPM

## 2025-04-14 DIAGNOSIS — Z86.73 HISTORY OF CVA (CEREBROVASCULAR ACCIDENT) WITHOUT RESIDUAL DEFICITS: ICD-10-CM

## 2025-04-14 DIAGNOSIS — I67.2 CEREBRAL ATHEROSCLEROSIS: Chronic | ICD-10-CM

## 2025-04-14 DIAGNOSIS — R05.9 COUGH, UNSPECIFIED TYPE: ICD-10-CM

## 2025-04-14 DIAGNOSIS — R09.81 SINUS CONGESTION: ICD-10-CM

## 2025-04-14 DIAGNOSIS — U07.1 COVID: Primary | ICD-10-CM

## 2025-04-14 LAB
CTP QC/QA: YES
SARS-COV-2 RDRP RESP QL NAA+PROBE: POSITIVE

## 2025-04-14 PROCEDURE — 1101F PT FALLS ASSESS-DOCD LE1/YR: CPT | Mod: CPTII,S$GLB,, | Performed by: NURSE PRACTITIONER

## 2025-04-14 PROCEDURE — 3074F SYST BP LT 130 MM HG: CPT | Mod: CPTII,S$GLB,, | Performed by: NURSE PRACTITIONER

## 2025-04-14 PROCEDURE — 99214 OFFICE O/P EST MOD 30 MIN: CPT | Mod: S$GLB,,, | Performed by: NURSE PRACTITIONER

## 2025-04-14 PROCEDURE — 99999 PR PBB SHADOW E&M-EST. PATIENT-LVL IV: CPT | Mod: PBBFAC,,, | Performed by: NURSE PRACTITIONER

## 2025-04-14 PROCEDURE — 3288F FALL RISK ASSESSMENT DOCD: CPT | Mod: CPTII,S$GLB,, | Performed by: NURSE PRACTITIONER

## 2025-04-14 PROCEDURE — 3078F DIAST BP <80 MM HG: CPT | Mod: CPTII,S$GLB,, | Performed by: NURSE PRACTITIONER

## 2025-04-14 PROCEDURE — 87635 SARS-COV-2 COVID-19 AMP PRB: CPT | Mod: QW,S$GLB,, | Performed by: NURSE PRACTITIONER

## 2025-04-14 PROCEDURE — 1159F MED LIST DOCD IN RCRD: CPT | Mod: CPTII,S$GLB,, | Performed by: NURSE PRACTITIONER

## 2025-04-14 PROCEDURE — 1126F AMNT PAIN NOTED NONE PRSNT: CPT | Mod: CPTII,S$GLB,, | Performed by: NURSE PRACTITIONER

## 2025-04-15 ENCOUNTER — HOSPITAL ENCOUNTER (EMERGENCY)
Facility: HOSPITAL | Age: 85
Discharge: HOME | End: 2025-04-15
Attending: EMERGENCY MEDICINE
Payer: MEDICARE

## 2025-04-15 ENCOUNTER — PATIENT MESSAGE (OUTPATIENT)
Dept: INTERNAL MEDICINE | Facility: CLINIC | Age: 85
End: 2025-04-15
Payer: MEDICARE

## 2025-04-15 VITALS
HEART RATE: 86 BPM | RESPIRATION RATE: 18 BRPM | WEIGHT: 178.81 LBS | DIASTOLIC BLOOD PRESSURE: 62 MMHG | SYSTOLIC BLOOD PRESSURE: 130 MMHG | OXYGEN SATURATION: 96 % | TEMPERATURE: 99 F | BODY MASS INDEX: 25.65 KG/M2

## 2025-04-15 DIAGNOSIS — Z86.59 HISTORY OF DEMENTIA: ICD-10-CM

## 2025-04-15 DIAGNOSIS — Z13.6 SCREENING FOR CARDIOVASCULAR CONDITION: ICD-10-CM

## 2025-04-15 DIAGNOSIS — U07.1 LAB TEST POSITIVE FOR DETECTION OF COVID-19 VIRUS: ICD-10-CM

## 2025-04-15 DIAGNOSIS — I10 CHRONIC HYPERTENSION: ICD-10-CM

## 2025-04-15 DIAGNOSIS — R53.1 GENERALIZED WEAKNESS: Primary | ICD-10-CM

## 2025-04-15 LAB
ABSOLUTE EOSINOPHIL (OHS): 0 K/UL
ABSOLUTE MONOCYTE (OHS): 1.5 K/UL (ref 0.3–1)
ABSOLUTE NEUTROPHIL COUNT (OHS): 8.28 K/UL (ref 1.8–7.7)
ALBUMIN SERPL BCP-MCNC: 3.5 G/DL (ref 3.5–5.2)
ALP SERPL-CCNC: 70 UNIT/L (ref 40–150)
ALT SERPL W/O P-5'-P-CCNC: 27 UNIT/L (ref 10–44)
ANION GAP (OHS): 8 MMOL/L (ref 8–16)
APTT PPP: 34.6 SECONDS (ref 21–32)
AST SERPL-CCNC: 43 UNIT/L (ref 11–45)
BACTERIA #/AREA URNS AUTO: ABNORMAL /HPF
BASOPHILS # BLD AUTO: 0.02 K/UL
BASOPHILS NFR BLD AUTO: 0.2 %
BILIRUB SERPL-MCNC: 0.6 MG/DL (ref 0.1–1)
BILIRUB UR QL STRIP.AUTO: NEGATIVE
BNP SERPL-MCNC: 22 PG/ML (ref 0–99)
BUN SERPL-MCNC: 19 MG/DL (ref 8–23)
CALCIUM SERPL-MCNC: 8.7 MG/DL (ref 8.7–10.5)
CHLORIDE SERPL-SCNC: 104 MMOL/L (ref 95–110)
CLARITY UR: CLEAR
CO2 SERPL-SCNC: 24 MMOL/L (ref 23–29)
COLOR UR AUTO: YELLOW
CREAT SERPL-MCNC: 1.1 MG/DL (ref 0.5–1.4)
ERYTHROCYTE [DISTWIDTH] IN BLOOD BY AUTOMATED COUNT: 13.1 % (ref 11.5–14.5)
GFR SERPLBLD CREATININE-BSD FMLA CKD-EPI: >60 ML/MIN/1.73/M2
GLUCOSE SERPL-MCNC: 99 MG/DL (ref 70–110)
GLUCOSE UR QL STRIP: NEGATIVE
HCT VFR BLD AUTO: 43.5 % (ref 40–54)
HGB BLD-MCNC: 14.3 GM/DL (ref 14–18)
HGB UR QL STRIP: ABNORMAL
HOLD SPECIMEN: NORMAL
HYALINE CASTS UR QL AUTO: 3 /LPF (ref 0–1)
IMM GRANULOCYTES # BLD AUTO: 0.07 K/UL (ref 0–0.04)
IMM GRANULOCYTES NFR BLD AUTO: 0.7 % (ref 0–0.5)
INR PPP: 1 (ref 0.8–1.2)
KETONES UR QL STRIP: NEGATIVE
LACTATE SERPL-SCNC: 1.3 MMOL/L (ref 0.5–2.2)
LEUKOCYTE ESTERASE UR QL STRIP: NEGATIVE
LYMPHOCYTES # BLD AUTO: 0.49 K/UL (ref 1–4.8)
MAGNESIUM SERPL-MCNC: 2 MG/DL (ref 1.6–2.6)
MCH RBC QN AUTO: 29.7 PG (ref 27–31)
MCHC RBC AUTO-ENTMCNC: 32.9 G/DL (ref 32–36)
MCV RBC AUTO: 90 FL (ref 82–98)
MICROSCOPIC COMMENT: ABNORMAL
NITRITE UR QL STRIP: NEGATIVE
NUCLEATED RBC (/100WBC) (OHS): 0 /100 WBC
PH UR STRIP: 6 [PH]
PLATELET # BLD AUTO: 194 K/UL (ref 150–450)
PMV BLD AUTO: 10.3 FL (ref 9.2–12.9)
POTASSIUM SERPL-SCNC: 3.9 MMOL/L (ref 3.5–5.1)
PROCALCITONIN SERPL-MCNC: 0.09 NG/ML
PROT SERPL-MCNC: 7.3 GM/DL (ref 6–8.4)
PROT UR QL STRIP: ABNORMAL
PROTHROMBIN TIME: 11.3 SECONDS (ref 9–12.5)
RBC # BLD AUTO: 4.82 M/UL (ref 4.6–6.2)
RBC #/AREA URNS AUTO: 1 /HPF (ref 0–4)
RELATIVE EOSINOPHIL (OHS): 0 %
RELATIVE LYMPHOCYTE (OHS): 4.7 % (ref 18–48)
RELATIVE MONOCYTE (OHS): 14.5 % (ref 4–15)
RELATIVE NEUTROPHIL (OHS): 79.9 % (ref 38–73)
SODIUM SERPL-SCNC: 136 MMOL/L (ref 136–145)
SP GR UR STRIP: 1.02
SQUAMOUS #/AREA URNS AUTO: 3 /HPF
TROPONIN I SERPL DL<=0.01 NG/ML-MCNC: 0.03 NG/ML
UROBILINOGEN UR STRIP-ACNC: NEGATIVE EU/DL
WBC # BLD AUTO: 10.36 K/UL (ref 3.9–12.7)
WBC #/AREA URNS AUTO: 1 /HPF (ref 0–5)

## 2025-04-15 PROCEDURE — 25000242 PHARM REV CODE 250 ALT 637 W/ HCPCS: Performed by: EMERGENCY MEDICINE

## 2025-04-15 PROCEDURE — 81003 URINALYSIS AUTO W/O SCOPE: CPT | Performed by: EMERGENCY MEDICINE

## 2025-04-15 PROCEDURE — 94761 N-INVAS EAR/PLS OXIMETRY MLT: CPT

## 2025-04-15 PROCEDURE — 85025 COMPLETE CBC W/AUTO DIFF WBC: CPT | Performed by: EMERGENCY MEDICINE

## 2025-04-15 PROCEDURE — 25000003 PHARM REV CODE 250: Performed by: EMERGENCY MEDICINE

## 2025-04-15 PROCEDURE — 85730 THROMBOPLASTIN TIME PARTIAL: CPT | Performed by: EMERGENCY MEDICINE

## 2025-04-15 PROCEDURE — 99285 EMERGENCY DEPT VISIT HI MDM: CPT | Mod: 25

## 2025-04-15 PROCEDURE — 94640 AIRWAY INHALATION TREATMENT: CPT

## 2025-04-15 PROCEDURE — 83880 ASSAY OF NATRIURETIC PEPTIDE: CPT | Performed by: EMERGENCY MEDICINE

## 2025-04-15 PROCEDURE — 83735 ASSAY OF MAGNESIUM: CPT | Performed by: EMERGENCY MEDICINE

## 2025-04-15 PROCEDURE — 82947 ASSAY GLUCOSE BLOOD QUANT: CPT | Performed by: EMERGENCY MEDICINE

## 2025-04-15 PROCEDURE — 83605 ASSAY OF LACTIC ACID: CPT | Performed by: EMERGENCY MEDICINE

## 2025-04-15 PROCEDURE — 84484 ASSAY OF TROPONIN QUANT: CPT | Performed by: EMERGENCY MEDICINE

## 2025-04-15 PROCEDURE — 87040 BLOOD CULTURE FOR BACTERIA: CPT | Performed by: EMERGENCY MEDICINE

## 2025-04-15 PROCEDURE — 85610 PROTHROMBIN TIME: CPT | Performed by: EMERGENCY MEDICINE

## 2025-04-15 PROCEDURE — 84145 PROCALCITONIN (PCT): CPT | Performed by: EMERGENCY MEDICINE

## 2025-04-15 RX ORDER — IPRATROPIUM BROMIDE AND ALBUTEROL SULFATE 2.5; .5 MG/3ML; MG/3ML
3 SOLUTION RESPIRATORY (INHALATION)
Status: COMPLETED | OUTPATIENT
Start: 2025-04-15 | End: 2025-04-15

## 2025-04-15 RX ADMIN — SODIUM CHLORIDE 1000 ML: 9 INJECTION, SOLUTION INTRAVENOUS at 11:04

## 2025-04-15 RX ADMIN — IPRATROPIUM BROMIDE AND ALBUTEROL SULFATE 3 ML: 2.5; .5 SOLUTION RESPIRATORY (INHALATION) at 11:04

## 2025-04-15 NOTE — ED PROVIDER NOTES
SCRIBE #1 NOTE: I, Laurenssumi Hernández, am scribing for, and in the presence of, Jayleen Ramsay MD. I have scribed the entire note.       History     Chief Complaint   Patient presents with    Weakness    COVID-19 Concerns     Pt reports generalized weakness and decreased appetite x 5 days. Pt also reports being Covid positive x 5 days.     Review of patient's allergies indicates:   Allergen Reactions    Lisinopril Swelling         History of Present Illness     HPI    4/15/2025, 10:54 AM  History obtained primarily from the wife and medical records  HPI and ROS may be limited due to pt's hx of dementia      History of Present Illness: Pravin Ferrari is a 84 y.o. male patient with a PMHx of prostate cancer, HTN, dementia, and CVA who presents to the Emergency Department for evaluation of weakness which began this morning PTA. According to the pt's wife, the pt has been COVID positive for the last 5 days with associated sxs of  cough and decreased appetite. She was helping him put on some pants this morning while he was sitting down, when he started complaining that he couldn't get up or move. She was unable to help him up with the help of her daughter, so she called for EMS. Symptoms are constant and moderate in severity. No mitigating or exacerbating factors reported. Pt does not confirm or deny any other sxs at this time; wife denies any fever or SOB at this time. Prior Tx includes cough syrup.  No further complaints or concerns at this time.       Arrival mode: Ambulance Service    PCP: José Miguel Harrington MD        Past Medical History:  Past Medical History:   Diagnosis Date    Acute CVA (cerebrovascular accident) 1/11/2023    Cancer     prostate    [place] (hard of hearing)     bilateral ears    Hypertension     pt states no       Past Surgical History:  Past Surgical History:   Procedure Laterality Date    COLONOSCOPY N/A 3/19/2019    Procedure: COLONOSCOPY;  Surgeon: Korin Cavazos MD;  Location: [facility];   Service: Endoscopy;  Laterality: N/A;    EYE SURGERY      HERNIA REPAIR      PROSTATE SURGERY           Family History:  Family History   Problem Relation Name Age of Onset    No Known Problems Mother      Aneurysm Father      Cerebral aneurysm Father      Prostate cancer Brother      Prostate cancer Son         Social History:  Social History     Tobacco Use    Smoking status: Never    Smokeless tobacco: Never   Substance and Sexual Activity    Alcohol use: Not Currently    Drug use: No    Sexual activity: Not Currently        Review of Systems     Review of Systems   Constitutional:  Positive for appetite change (decr). Negative for fever.   Respiratory:  Positive for cough. Negative for shortness of breath.    Neurological:  Positive for weakness.      Physical Exam     Initial Vitals [04/15/25 1025]   BP Pulse Resp Temp SpO2   (!) 150/80 80 17 99.1 °F (37.3 °C) (!) 94 %      MAP       --          Physical Exam  Nursing Notes and Vital Signs Reviewed.  Constitutional: Patient is in no obvious distress. Well-developed and well-nourished.  Head: Atraumatic. Normocephalic.  Eyes: PERRL. EOM intact. Conjunctivae are not pale. No scleral icterus.  ENT: Mucous membranes are moist. Oropharynx is clear and symmetric.    Neck: Supple. Full ROM. No lymphadenopathy.  Cardiovascular: Regular rate. Regular rhythm. No murmurs, rubs, or gallops. Distal pulses are 2+ and symmetric.  Pulmonary/Chest: No respiratory distress. Faint expiratory wheeze with some rhonchi. Mild cough.  Abdominal: Soft and non-distended. There is no tenderness.  No rebound, guarding, or rigidity.  Musculoskeletal: Moves all extremities. No obvious deformities. No edema. No calf tenderness.  Skin: Warm and dry.  Neurological: Pleasantly demented, but awake and alert.  Normal speech.  No acute focal neurological deficits are appreciated.  Psychiatric: Normal affect. Good eye contact. Appropriate in content.     ED Course   Procedures  ED Vital  Signs:  Vitals:    04/15/25 1025 04/15/25 1052 04/15/25 1105 04/15/25 1124   BP: (!) 150/80  121/66    Pulse: 80 90 82    Resp: 17  17    Temp: 99.1 °F (37.3 °C)      TempSrc: Oral      SpO2: (!) 94%  95%    Weight:    81.1 kg (178 lb 12.7 oz)    04/15/25 1127 04/15/25 1129 04/15/25 1136 04/15/25 1137   BP: (!) 140/65 (!) 154/76     Pulse: 81 89 81 79   Resp:   18 18   Temp:       TempSrc:       SpO2:   95% 95%   Weight:        04/15/25 1138   BP:    Pulse: 81   Resp: 18   Temp:    TempSrc:    SpO2: 95%   Weight:        Abnormal Lab Results:  Labs Reviewed   URINALYSIS, REFLEX TO URINE CULTURE - Abnormal       Result Value    Color, UA Yellow      Appearance, UA Clear      pH, UA 6.0      Spec Grav UA 1.020      Protein, UA 1+ (*)     Glucose, UA Negative      Ketones, UA Negative      Bilirubin, UA Negative      Blood, UA Trace (*)     Nitrites, UA Negative      Urobilinogen, UA Negative      Leukocyte Esterase, UA Negative     APTT - Abnormal    PTT 34.6 (*)    CBC WITH DIFFERENTIAL - Abnormal    WBC 10.36      RBC 4.82      HGB 14.3      HCT 43.5      MCV 90      MCH 29.7      MCHC 32.9      RDW 13.1      Platelet Count 194      MPV 10.3      Nucleated RBC 0      Neut % 79.9 (*)     Lymph % 4.7 (*)     Mono % 14.5      Eos % 0.0      Basophil % 0.2      Imm Grans % 0.7 (*)     Neut # 8.28 (*)     Lymph # 0.49 (*)     Mono # 1.50 (*)     Eos # 0.00      Baso # 0.02      Imm Grans # 0.07 (*)    URINALYSIS MICROSCOPIC - Abnormal    RBC, UA 1      WBC, UA 1      Bacteria, UA None      Squamous Epithelial Cells, UA 3      Hyaline Casts, UA 3 (*)     Microscopic Comment       COMPREHENSIVE METABOLIC PANEL - Normal    Sodium 136      Potassium 3.9      Chloride 104      CO2 24      Glucose 99      BUN 19      Creatinine 1.1      Calcium 8.7      Protein Total 7.3      Albumin 3.5      Bilirubin Total 0.6      ALP 70      AST 43      ALT 27      Anion Gap 8      eGFR >60     LACTIC ACID, PLASMA - Normal    Lactic Acid  Level 1.3      Narrative:     Falsely low lactic acid results can be found in samples containing >=13.0 mg/dL total bilirubin and/or >=3.5 mg/dL direct bilirubin.    PROCALCITONIN - Normal    Procalcitonin 0.09     B-TYPE NATRIURETIC PEPTIDE - Normal    BNP 22     PROTIME-INR - Normal    PT 11.3      INR 1.0     MAGNESIUM - Normal    Magnesium  2.0     TROPONIN I - Normal    Troponin-I 0.025     CULTURE, BLOOD   CULTURE, BLOOD   CBC W/ AUTO DIFFERENTIAL    Narrative:     The following orders were created for panel order CBC auto differential.  Procedure                               Abnormality         Status                     ---------                               -----------         ------                     CBC with Differential[[id number]]       Abnormal            Final result                 Please view results for these tests on the individual orders.   GREY TOP URINE HOLD    Extra Tube Hold for add-ons.          All Lab Results:  Results for orders placed or performed during the hospital encounter of 04/15/25   Comprehensive metabolic panel    Collection Time: 04/15/25 11:04 AM   Result Value Ref Range    Sodium 136 136 - 145 mmol/L    Potassium 3.9 3.5 - 5.1 mmol/L    Chloride 104 95 - 110 mmol/L    CO2 24 23 - 29 mmol/L    Glucose 99 70 - 110 mg/dL    BUN 19 8 - 23 mg/dL    Creatinine 1.1 0.5 - 1.4 mg/dL    Calcium 8.7 8.7 - 10.5 mg/dL    Protein Total 7.3 6.0 - 8.4 gm/dL    Albumin 3.5 3.5 - 5.2 g/dL    Bilirubin Total 0.6 0.1 - 1.0 mg/dL    ALP 70 40 - 150 unit/L    AST 43 11 - 45 unit/L    ALT 27 10 - 44 unit/L    Anion Gap 8 8 - 16 mmol/L    eGFR >60 >60 mL/min/1.73/m2   Lactic acid, plasma #1    Collection Time: 04/15/25 11:04 AM   Result Value Ref Range    Lactic Acid Level 1.3 0.5 - 2.2 mmol/L   Procalcitonin    Collection Time: 04/15/25 11:04 AM   Result Value Ref Range    Procalcitonin 0.09 <0.25 ng/mL   Brain natriuretic peptide    Collection Time: 04/15/25 11:04 AM   Result Value Ref Range     BNP 22 0 - 99 pg/mL   Protime-INR    Collection Time: 04/15/25 11:04 AM   Result Value Ref Range    PT 11.3 9.0 - 12.5 seconds    INR 1.0 0.8 - 1.2   APTT    Collection Time: 04/15/25 11:04 AM   Result Value Ref Range    PTT 34.6 (H) 21.0 - 32.0 seconds   Magnesium    Collection Time: 04/15/25 11:04 AM   Result Value Ref Range    Magnesium  2.0 1.6 - 2.6 mg/dL   Troponin I    Collection Time: 04/15/25 11:04 AM   Result Value Ref Range    Troponin-I 0.025 <=0.026 ng/mL   CBC with Differential    Collection Time: 04/15/25 11:04 AM   Result Value Ref Range    WBC 10.36 3.90 - 12.70 K/uL    RBC 4.82 4.60 - 6.20 M/uL    HGB 14.3 14.0 - 18.0 gm/dL    HCT 43.5 40.0 - 54.0 %    MCV 90 82 - 98 fL    MCH 29.7 27.0 - 31.0 pg    MCHC 32.9 32.0 - 36.0 g/dL    RDW 13.1 11.5 - 14.5 %    Platelet Count 194 150 - 450 K/uL    MPV 10.3 9.2 - 12.9 fL    Nucleated RBC 0 <=0 /100 WBC    Neut % 79.9 (H) 38 - 73 %    Lymph % 4.7 (L) 18 - 48 %    Mono % 14.5 4 - 15 %    Eos % 0.0 <=8 %    Basophil % 0.2 <=1.9 %    Imm Grans % 0.7 (H) 0.0 - 0.5 %    Neut # 8.28 (H) 1.8 - 7.7 K/uL    Lymph # 0.49 (L) 1 - 4.8 K/uL    Mono # 1.50 (H) 0.3 - 1 K/uL    Eos # 0.00 <=0.5 K/uL    Baso # 0.02 <=0.2 K/uL    Imm Grans # 0.07 (H) 0.00 - 0.04 K/uL   Urinalysis, Reflex to Urine Culture    Collection Time: 04/15/25 11:21 AM    Specimen: Urine   Result Value Ref Range    Color, UA Yellow Straw, Vicky, Yellow, Light-Orange    Appearance, UA Clear Clear    pH, UA 6.0 5.0 - 8.0    Spec Grav UA 1.020 1.005 - 1.030    Protein, UA 1+ (A) Negative    Glucose, UA Negative Negative    Ketones, UA Negative Negative    Bilirubin, UA Negative Negative    Blood, UA Trace (A) Negative    Nitrites, UA Negative Negative    Urobilinogen, UA Negative <2.0 EU/dL    Leukocyte Esterase, UA Negative Negative   GREY TOP URINE HOLD    Collection Time: 04/15/25 11:21 AM   Result Value Ref Range    Extra Tube Hold for add-ons.    Urinalysis Microscopic    Collection Time: 04/15/25  11:21 AM   Result Value Ref Range    RBC, UA 1 0 - 4 /HPF    WBC, UA 1 0 - 5 /HPF    Bacteria, UA None None, Rare, Occasional /HPF    Squamous Epithelial Cells, UA 3 /HPF    Hyaline Casts, UA 3 (H) 0 - 1 /LPF    Microscopic Comment         Imaging Results:  Imaging Results              X-Ray Chest AP Portable (Final result)  Result time 04/15/25 13:01:38      Final result by Radha FalconKurtis), MD (04/15/25 13:01:38)                   Impression:      Clear lungs      Electronically signed by: Radha Falcon MD  Date:    04/15/2025  Time:    13:01               Narrative:    EXAMINATION:  XR CHEST AP PORTABLE    CLINICAL HISTORY:  , Shortness of breath;    COMPARISON:  Chest, 01/11/2023    FINDINGS:  One view.  Mild cardiomegaly.  Clear lungs                                       The EKG was ordered, reviewed, and independently interpreted by the ED provider.  Interpretation time: 10:50  Rate: 83 BPM  Rhythm: normal sinus rhythm with sinus arrhythmia  Interpretation: Left axis deviation. Right bundle branch block. Inferior infarct, age undetermined. No STEMI.         The Emergency Provider reviewed the vital signs and test results, which are outlined above.     ED Discussion       1:13 PM: Reassessed pt at this time. Discussed with patient and/or family/caretaker all pertinent ED information and results. Discussed pt dx and plan of tx. Gave the family all f/u and return to the ED instructions. All questions and concerns were addressed at this time. Patient and/or family/caretaker expresses understanding of information and instructions, and is comfortable with plan to discharge. Pt is stable for discharge.     I discussed with patient and/or family/caretaker that evaluation in the ED does not suggest any emergent or life threatening medical conditions requiring immediate intervention beyond what was provided in the ED, and I believe patient is safe for discharge.  Regardless, an unremarkable evaluation in the  ED does not preclude the development or presence of a serious of life threatening condition. As such, I instructed that the patient is to return immediately for any worsening or change in current symptoms.         Medical Decision Making  DDX: 1. Dehydration 2. Pneumonia 3. Electrolyte dysfunction    CXR normal, sepsis markers negative, wbc normal, kidney function normal, electrolytes normal, VSS, wife at bedside, overall patient appears clinically stable, oxygen level normal, admission initially considered but no indication for admission at this time and he appears stable for discharge.     Amount and/or Complexity of Data Reviewed  Labs: ordered. Decision-making details documented in ED Course.  Radiology: ordered. Decision-making details documented in ED Course.  ECG/medicine tests: ordered and independent interpretation performed. Decision-making details documented in ED Course.    Risk  Prescription drug management.                ED Medication(s):  Medications   albuterol-ipratropium 2.5 mg-0.5 mg/3 mL nebulizer solution 3 mL (3 mLs Nebulization Given 4/15/25 1138)   sodium chloride 0.9% bolus 1,000 mL 1,000 mL (1,000 mLs Intravenous New Bag 4/15/25 1131)       New Prescriptions    No medications on file        Follow-up Information       José Miguel Harrington MD. Schedule an appointment as soon as possible for a visit in 2 days.    Specialty: Family Medicine  Why: REturn to the Emergency Room, If symptoms worsen  Contact information:  [id number]  [place] [address]  [phone]                                 Scribe Attestation:   Scribe #1: I performed the above scribed service and the documentation accurately describes the services I performed. I attest to the accuracy of the note.     Attending:   Physician Attestation Statement for Scribe #1: I, Jayleen Ramsay MD, personally performed the services described in this documentation, as scribed by Jojo Hernández, in my presence, and it is both  accurate and complete.           Clinical Impression       ICD-10-CM ICD-9-CM   1. Generalized weakness  R53.1 780.79   2. Screening for cardiovascular condition  Z13.6 V81.2   3. Lab test positive for detection of COVID-19 virus  U07.1 079.89   4. Chronic hypertension  I10 401.9   5. History of dementia  Z86.59 V11.8       Disposition:   Disposition: Discharged  Condition: Stable       Jayleen Ramsay MD  04/21/25 1001

## 2025-04-16 PROBLEM — U07.1 COVID: Status: ACTIVE | Noted: 2025-04-16

## 2025-04-16 NOTE — PROGRESS NOTES
Subjective:       Patient ID: Pravin Ferrari is a 84 y.o. male.    Chief Complaint: Sinus Problem (Pt presents to clinic with cough and sinus issues since Saturday. )    Sinus Problem  Associated symptoms include coughing. Pertinent negatives include no chills, congestion, diaphoresis, ear pain, headaches, neck pain, shortness of breath, sinus pressure, sneezing or sore throat.           Past Medical History:   Diagnosis Date    Acute CVA (cerebrovascular accident) 1/11/2023    Cancer     prostate    [place] (hard of hearing)     bilateral ears    Hypertension     pt states no     Past Surgical History:   Procedure Laterality Date    COLONOSCOPY N/A 3/19/2019    Procedure: COLONOSCOPY;  Surgeon: Korin Cavazos MD;  Location: [facility];  Service: Endoscopy;  Laterality: N/A;    EYE SURGERY      HERNIA REPAIR      PROSTATE SURGERY       Social History[1]  Review of patient's allergies indicates:   Allergen Reactions    Lisinopril Swelling     Current Outpatient Medications   Medication Sig    amLODIPine (NORVASC) 5 MG tablet TAKE 1 TABLET(5 MG) BY MOUTH EVERY DAY    aspirin (ECOTRIN) 81 MG EC tablet Take 81 mg by mouth once daily.    atorvastatin (LIPITOR) 40 MG tablet Take 1 tablet (40 mg total) by mouth once daily.    busPIRone (BUSPAR) 5 MG Tab Take 1 tablet (5 mg total) by mouth 3 (three) times daily.    clopidogreL (PLAVIX) 75 mg tablet Take 1 tablet (75 mg total) by mouth once daily.    donepeziL (ARICEPT) 10 MG tablet Take 1 tablet (10 mg total) by mouth nightly.    memantine (NAMENDA) 10 MG Tab Take 1 tablet (10 mg total) by mouth 2 (two) times a day.    multivitamin (THERAGRAN) per tablet Take 1 tablet by mouth once daily.     No current facility-administered medications for this visit.           Review of Systems   Constitutional:  Negative for activity change, appetite change, chills, diaphoresis, fatigue, fever and unexpected weight change.   HENT:  Negative for congestion, ear pain, postnasal drip,  rhinorrhea, sinus pressure, sinus pain, sneezing, sore throat, tinnitus, trouble swallowing and voice change.    Eyes:  Negative for photophobia, pain and visual disturbance.   Respiratory:  Positive for cough. Negative for chest tightness, shortness of breath and wheezing.    Cardiovascular:  Negative for chest pain, palpitations and leg swelling.   Gastrointestinal:  Negative for abdominal distention, abdominal pain, constipation, diarrhea, nausea and vomiting.   Genitourinary:  Negative for decreased urine volume, difficulty urinating, dysuria, flank pain, frequency, hematuria and urgency.   Musculoskeletal:  Negative for arthralgias, back pain, joint swelling, neck pain and neck stiffness.   Allergic/Immunologic: Negative for immunocompromised state.   Neurological:  Negative for dizziness, tremors, seizures, syncope, facial asymmetry, speech difficulty, weakness, light-headedness, numbness and headaches.   Hematological:  Negative for adenopathy. Does not bruise/bleed easily.   Psychiatric/Behavioral:  Negative for confusion and sleep disturbance.        Objective:      Physical Exam  Vitals reviewed.   Cardiovascular:      Rate and Rhythm: Normal rate and regular rhythm.      Heart sounds: Normal heart sounds.   Pulmonary:      Effort: Pulmonary effort is normal.      Breath sounds: Normal breath sounds.   Abdominal:      General: Bowel sounds are normal.      Palpations: Abdomen is soft.   Neurological:      Mental Status: He is alert and oriented to person, place, and time.         Assessment:     Vitals:    04/14/25 1432   BP: 122/64   Pulse: 78   Resp: 18   Temp: 98.6 °F (37 °C)         1. COVID    2. Sinus congestion    3. Cough, unspecified type    4. History of CVA (cerebrovascular accident) without residual deficits    5. Cerebral atherosclerosis        Plan:   COVID  Comments:  Patient on Plavix decided against  starting Paxlovid due to contraindication    Sinus congestion  -     POCT COVID-19 Rapid  Screening    Cough, unspecified type  -     POCT COVID-19 Rapid Screening    History of CVA (cerebrovascular accident) without residual deficits    Cerebral atherosclerosis      Supportive care discussed  Claritin/Mucinex lips Flonase/Delsym cough syrup  Return p.r.n.       [1]   Social History  Socioeconomic History    Marital status:    Tobacco Use    Smoking status: Never    Smokeless tobacco: Never   Substance and Sexual Activity    Alcohol use: Not Currently    Drug use: No    Sexual activity: Not Currently

## 2025-04-20 LAB
BACTERIA BLD CULT: NORMAL
BACTERIA BLD CULT: NORMAL

## 2025-04-29 ENCOUNTER — OFFICE VISIT (OUTPATIENT)
Dept: PODIATRY | Facility: CLINIC | Age: 85
End: 2025-04-29
Payer: MEDICARE

## 2025-04-29 DIAGNOSIS — Z79.01 CURRENT USE OF ANTICOAGULANT THERAPY: Primary | ICD-10-CM

## 2025-04-29 DIAGNOSIS — Z86.73 HISTORY OF CVA (CEREBROVASCULAR ACCIDENT) WITHOUT RESIDUAL DEFICITS: ICD-10-CM

## 2025-04-29 DIAGNOSIS — L60.3 ONYCHODYSTROPHY: ICD-10-CM

## 2025-04-29 DIAGNOSIS — I67.2 CEREBRAL ATHEROSCLEROSIS: ICD-10-CM

## 2025-04-29 PROCEDURE — 99499 UNLISTED E&M SERVICE: CPT | Mod: S$GLB,,, | Performed by: PODIATRIST

## 2025-04-29 PROCEDURE — 11721 DEBRIDE NAIL 6 OR MORE: CPT | Mod: Q9,S$GLB,, | Performed by: PODIATRIST

## 2025-04-29 PROCEDURE — 99999 PR PBB SHADOW E&M-EST. PATIENT-LVL II: CPT | Mod: PBBFAC,,, | Performed by: PODIATRIST

## 2025-04-29 NOTE — PROGRESS NOTES
Subjective:       Patient ID: Pravin Ferrari is a 84 y.o. male.    Chief Complaint: Nail Care (6 month nail care: c/o denies pain, pt is nondiabetic, LTAT, pt was last seen on 3.25.25 by José Miguel Harrington)    HPI: Patient presents to the office today with the chief complaint of elongated, thickened and dystrophic nail plates to the B/L foot.  Does have history of long-term anticoagulation therapy due to history of CVA.  Continues with moderate cognitive disorder.  Patient does follow with Primary Care for management of comorbid states. This patient's PMD is José Miguel Harrington MD. This patient last saw his/her primary care provider on 3/25/25    Review of patient's allergies indicates:   Allergen Reactions    Lisinopril Swelling       Past Medical History:   Diagnosis Date    Acute CVA (cerebrovascular accident) 1/11/2023    Cancer     prostate    [place] (hard of hearing)     bilateral ears    Hypertension     pt states no       Family History   Problem Relation Name Age of Onset    No Known Problems Mother      Aneurysm Father      Cerebral aneurysm Father      Prostate cancer Brother      Prostate cancer Son       Social History     Socioeconomic History    Marital status:    Tobacco Use    Smoking status: Never    Smokeless tobacco: Never   Substance and Sexual Activity    Alcohol use: Not Currently    Drug use: No    Sexual activity: Not Currently     Past Surgical History:   Procedure Laterality Date    COLONOSCOPY N/A 3/19/2019    Procedure: COLONOSCOPY;  Surgeon: Korin Cavazos MD;  Location: [facility];  Service: Endoscopy;  Laterality: N/A;    EYE SURGERY      HERNIA REPAIR      PROSTATE SURGERY       Review of Systems    Objective:   There were no vitals taken for this visit.    Physical Exam  LOWER EXTREMITY PHYSICAL EXAMINATION    VASCULAR:  The right dorsalis pedis pulse 2/4 and the right posterior tibial pulse 2/4.  The left dorsalis pedis pulse 2/4 and posterior tibial pulse on the left is 2/4.   Capillary refill is intact.  Pedal hair growth intact    NEUROLOGY: Protective sensation is not intact to the left and right plantar surfaces of the foot and digits, as the patient has no sensation/detection at greater than 4 distinct points of contact with 5.07 [place] monofilament. Sensation to light touch is intact on the left and right foot. Proprioception is intact, bilateral. Sensation to pin prick is reduced to absent. Vibratory sensation is diminished.    DERMATOLOGY:  Skin is supple, moist, intact.  There is no signs of callusing, ulcerations, other lesions identified to the dorsal or plantar aspect of the right or left foot.  The R1, 2, 5 and left L1,2, 5 are thickened, discolored dystrophic.  There is subungual debris.  Nail plates have area of dark discoloration.  The remaining nails 3-4 on the right foot and the left foot are elongated but of normal color, thickness, and texture.   There is no signs of ingrowing into the medial or lateral borders.  There is no evidence of wounds or skin breakdown.  No edema or erythema.  No obvious lacerations or fissuring.  Interdigital spaces are clean, dry, intact.  No rashes or scars appreciated.    ORTHOPEDIC: Manual Muscle Testing is 5/5 in all planes on the left and right, without pains, with and without resistance. Gait pattern is non-antalgic.    Assessment:     1. Current use of anticoagulant therapy    2. History of CVA (cerebrovascular accident) without residual deficits    3. Cerebral atherosclerosis    4. Onychodystrophy        Plan:     Current use of anticoagulant therapy    History of CVA (cerebrovascular accident) without residual deficits    Cerebral atherosclerosis    Onychodystrophy      Thorough discussion is had with the patient today, concerning the diagnosis, its etiology, and the treatment algorithm at present.    Thorough discussion is had with the patient concerning the diagnosis, its etiology, and the treatment algorithm at present.  Shoe inspection. Foot Education. Patient reminded of the importance of good nutrition and blood sugar control to help prevent podiatric complications of diabetes. Patient instructed on proper foot hygeine. We discussed wearing proper and supportive shoe gear, daily foot inspections, never walking barefooted or sock footed, never putting sharp instruments to feet which can cause major complications associated with infection, ulcers, lacerations.    Dystrophic nail plates, as outlined above (R#1,2,5  ; L#1,2,5 ), are sharply debrided with double action nail nipper, and/or with the assistance of a mechanical rotary kimi, with removal of all offending nail and nail border(s), for reduction of pains. Nails are reduced in terms of length, width and girth with removal of subungual debris to facilitate pain free weight bearing and ambulation. The elongated nails as outlined in the objective portion of this note, were trimmed to appropriate length, with a double action nail nipper, for alleviation/reduction of pains as well. Follow up in approx. 3-4 months.        Future Appointments   Date Time Provider Department Center   10/9/2025  9:30 AM Penny Del Valle NP [facility]   10/29/2025  1:00 PM Hayley Arora DPM [facility]

## 2025-07-07 ENCOUNTER — OFFICE VISIT (OUTPATIENT)
Dept: INTERNAL MEDICINE | Facility: CLINIC | Age: 85
End: 2025-07-07
Payer: MEDICARE

## 2025-07-07 VITALS
BODY MASS INDEX: 22.82 KG/M2 | OXYGEN SATURATION: 96 % | DIASTOLIC BLOOD PRESSURE: 66 MMHG | TEMPERATURE: 97 F | WEIGHT: 159.38 LBS | HEART RATE: 83 BPM | HEIGHT: 70 IN | SYSTOLIC BLOOD PRESSURE: 102 MMHG

## 2025-07-07 DIAGNOSIS — F02.B18 MODERATE MAJOR NEUROCOGNITIVE DISORDER DUE TO ALZHEIMER'S DISEASE WITH BEHAVIORAL DISTURBANCE: ICD-10-CM

## 2025-07-07 DIAGNOSIS — J06.9 UPPER RESPIRATORY TRACT INFECTION, UNSPECIFIED TYPE: ICD-10-CM

## 2025-07-07 DIAGNOSIS — H10.9 BACTERIAL CONJUNCTIVITIS: ICD-10-CM

## 2025-07-07 DIAGNOSIS — H61.23 BILATERAL IMPACTED CERUMEN: ICD-10-CM

## 2025-07-07 DIAGNOSIS — G30.9 MODERATE MAJOR NEUROCOGNITIVE DISORDER DUE TO ALZHEIMER'S DISEASE WITH BEHAVIORAL DISTURBANCE: ICD-10-CM

## 2025-07-07 DIAGNOSIS — J40 BRONCHITIS: Primary | ICD-10-CM

## 2025-07-07 PROBLEM — U07.1 COVID: Status: RESOLVED | Noted: 2025-04-16 | Resolved: 2025-07-07

## 2025-07-07 PROCEDURE — 3078F DIAST BP <80 MM HG: CPT | Mod: CPTII,S$GLB,, | Performed by: PHYSICIAN ASSISTANT

## 2025-07-07 PROCEDURE — 3074F SYST BP LT 130 MM HG: CPT | Mod: CPTII,S$GLB,, | Performed by: PHYSICIAN ASSISTANT

## 2025-07-07 PROCEDURE — 99999 PR PBB SHADOW E&M-EST. PATIENT-LVL IV: CPT | Mod: PBBFAC,,, | Performed by: PHYSICIAN ASSISTANT

## 2025-07-07 PROCEDURE — 1160F RVW MEDS BY RX/DR IN RCRD: CPT | Mod: CPTII,S$GLB,, | Performed by: PHYSICIAN ASSISTANT

## 2025-07-07 PROCEDURE — 3288F FALL RISK ASSESSMENT DOCD: CPT | Mod: CPTII,S$GLB,, | Performed by: PHYSICIAN ASSISTANT

## 2025-07-07 PROCEDURE — 1101F PT FALLS ASSESS-DOCD LE1/YR: CPT | Mod: CPTII,S$GLB,, | Performed by: PHYSICIAN ASSISTANT

## 2025-07-07 PROCEDURE — 99214 OFFICE O/P EST MOD 30 MIN: CPT | Mod: S$GLB,,, | Performed by: PHYSICIAN ASSISTANT

## 2025-07-07 PROCEDURE — 1159F MED LIST DOCD IN RCRD: CPT | Mod: CPTII,S$GLB,, | Performed by: PHYSICIAN ASSISTANT

## 2025-07-07 PROCEDURE — 1126F AMNT PAIN NOTED NONE PRSNT: CPT | Mod: CPTII,S$GLB,, | Performed by: PHYSICIAN ASSISTANT

## 2025-07-07 RX ORDER — POLYMYXIN B SULFATE AND TRIMETHOPRIM 1; 10000 MG/ML; [USP'U]/ML
1 SOLUTION OPHTHALMIC EVERY 6 HOURS
Qty: 10 ML | Refills: 1 | Status: SHIPPED | OUTPATIENT
Start: 2025-07-07 | End: 2025-07-14

## 2025-07-07 RX ORDER — AMOXICILLIN AND CLAVULANATE POTASSIUM 875; 125 MG/1; MG/1
1 TABLET, FILM COATED ORAL 2 TIMES DAILY
Qty: 14 TABLET | Refills: 0 | Status: SHIPPED | OUTPATIENT
Start: 2025-07-07 | End: 2025-07-14

## 2025-07-07 RX ORDER — BENZONATATE 200 MG/1
200 CAPSULE ORAL 3 TIMES DAILY PRN
Qty: 21 CAPSULE | Refills: 0 | Status: SHIPPED | OUTPATIENT
Start: 2025-07-07 | End: 2025-07-14

## 2025-07-07 NOTE — PROGRESS NOTES
Subjective:       Patient ID: Pravin Ferrari is a 84 y.o. male.    Chief Complaint: Fever, Fatigue, Cough, and Anorexia    This patient is new to me, PCP is Dr. Harrington.       CHIEF COMPLAINT:  - Mr. Ferrari presents with persistent cough, low-grade fever, and general malaise for 5 days, with worsening symptoms despite initial home treatment and an urgent care visit.    HPI:  - Parvin, who has dementia, began experiencing symptoms 5 days ago, starting with persistent coughing. His wife initially treated him with Delsym cough syrup, which provided temporary relief for sleep. The cough continued but improved with twice-daily cough syrup administration the following day.  - Two days ago, due to lack of improvement, he was evaluated at urgent care. Pravin was diagnosed with a possible viral infection, but no medication was prescribed.  - His condition has persisted for 5 days without improvement. He has decreased appetite, resulting in weight loss and difficulty maintaining hydration. His wife reports challenges in ensuring he consumes 20 ounces of liquid daily. Pravin's blood pressure has been low, possibly due to dehydration, although it was elevated 2 days ago.  - A low-grade fever developed 2 days ago, with temperatures recorded as follows: 3 days ago 98.1°F, 2 days ago slightly elevated, and most recently 99.4°F. He has been sleeping more than usual, with deeper sleep rather than his typical brief naps.  - His cough has worsened, particularly at night, accompanied by discomfort in his right side when coughing. His wife notes that the cough sounds congested, with ineffective expectoration. His gait has become slower and more unsteady than his usual dementia-associated shuffling.  - Today, he has developed eye discharge and nasal congestion. His wife has been using disinfectant spray frequently due to concerns about illness transmission to a family member with kidney disease.  - His wife reports asymmetrical ear  congestion, which was noted during the urgent care evaluation. She expresses concern about potential hearing issues impacting his dementia progression.  - Pravin has a strong odor, which his wife attributes to his use of adult diapers and reluctance to change them frequently.  - He denies sore throat, scratchy throat, and headache.    MEDICATIONS:  - Delsym cough syrup, twice daily for cough  - Protein C20 drink, for hydration/nutrition    MEDICAL HISTORY:  - Dementia  - Breast cancer: Past history, received radiation treatment    FAMILY HISTORY:  - Daughter: Stage 5 kidney disease, on dialysis    TEST RESULTS:  - COVID test: Saturday, negative    IMAGING:  - Chest XR: Saturday, clear    SOCIAL HISTORY:  - Marital status:          Review of Systems   Constitutional:  Positive for fatigue and fever. Negative for chills and unexpected weight change.   HENT:  Negative for congestion, dental problem, ear pain, hearing loss, rhinorrhea and trouble swallowing.    Eyes:  Negative for pain and visual disturbance.   Respiratory:  Positive for cough. Negative for shortness of breath.    Cardiovascular:  Negative for chest pain, palpitations and leg swelling.   Gastrointestinal:  Negative for abdominal distention, abdominal pain, blood in stool, constipation, diarrhea, nausea and vomiting.   Genitourinary:  Negative for difficulty urinating.   Musculoskeletal:  Negative for arthralgias and myalgias.   Skin:  Negative for rash.   Neurological:  Negative for dizziness, weakness, numbness and headaches.   Hematological:  Negative for adenopathy. Does not bruise/bleed easily.   Psychiatric/Behavioral:  Negative for agitation, dysphoric mood and sleep disturbance.        Objective:      Physical Exam  Vitals reviewed.   Constitutional:       General: He is not in acute distress.     Appearance: Normal appearance. He is well-developed and normal weight. He is ill-appearing. He is not toxic-appearing.   HENT:      Head:  Normocephalic and atraumatic.      Right Ear: There is impacted cerumen.      Left Ear: There is impacted cerumen.      Mouth/Throat:      Pharynx: No oropharyngeal exudate or posterior oropharyngeal erythema.   Eyes:      General: Lids are normal. No scleral icterus.     Extraocular Movements: Extraocular movements intact.      Conjunctiva/sclera: Conjunctivae normal.      Right eye: Exudate present.      Pupils: Pupils are equal, round, and reactive to light.   Cardiovascular:      Rate and Rhythm: Normal rate and regular rhythm.      Heart sounds: Normal heart sounds. No murmur heard.     No friction rub. No gallop.   Pulmonary:      Effort: Pulmonary effort is normal. No respiratory distress.      Breath sounds: No stridor. Wheezing present. No decreased breath sounds, rhonchi or rales.   Neurological:      Mental Status: He is alert. Mental status is at baseline.      Cranial Nerves: No cranial nerve deficit.      Gait: Gait normal.      Comments: Patient has dementia, caretaker present in clinic.    Psychiatric:         Mood and Affect: Mood and affect normal.         Behavior: Behavior normal.         Thought Content: Thought content normal.         Judgment: Judgment normal.         Assessment:       1. Bronchitis    2. Upper respiratory tract infection, unspecified type    3. Bacterial conjunctivitis    4. Bilateral impacted cerumen    5. 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 Bronchitis  -     amoxicillin-clavulanate 875-125mg (AUGMENTIN) 875-125 mg per tablet; Take 1 tablet by mouth 2 (two) times daily. for 7 days  Dispense: 14 tablet; Refill: 0  -     benzonatate (TESSALON) 200 MG capsule; Take 1 capsule (200 mg total) by mouth 3 (three) times daily as needed for Cough.  Dispense: 21 capsule; Refill: 0    2. Upper respiratory tract infection, unspecified type  -     amoxicillin-clavulanate 875-125mg (AUGMENTIN) 875-125 mg per  tablet; Take 1 tablet by mouth 2 (two) times daily. for 7 days  Dispense: 14 tablet; Refill: 0  -     benzonatate (TESSALON) 200 MG capsule; Take 1 capsule (200 mg total) by mouth 3 (three) times daily as needed for Cough.  Dispense: 21 capsule; Refill: 0    3. Bacterial conjunctivitis  -     polymyxin B sulf-trimethoprim (POLYTRIM) 10,000 unit- 1 mg/mL Drop; Place 1 drop into the right eye every 6 (six) hours. for 7 days  Dispense: 10 mL; Refill: 1    4. Bilateral impacted cerumen  -     Ambulatory referral/consult to ENT; Future; Expected date: 07/14/2025    5. 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  - Considered potential eye infection due to observed discharge.  - Opted for Augmentin as antibiotic of choice, considering its efficacy for both respiratory and potential UTIs.  - Decided against nighttime cough medicine to avoid disorientation, given dementia.    UNSPECIFIED DEMENTIA:   Evaluated that dementia medication has effectively delayed disease progression.   Discussed the possibility of dementia progressing to another level.   Referred the patient to ENT to address ear clogging, as it can affect dementia progression.    ACUTE UPPER RESPIRATORY INFECTION:   Noted the patient has been coughing non-stop since Thursday, with symptoms persisting for 5 days.   Cough is particularly problematic at night and early morning.   Auscultation revealed abnormal lung sounds indicating possible infection.   Discussed likely viral infection with no improvement over 5 days.    ACUTE CONJUNCTIVITIS (RIGHT EYE):   Noted discharge and crusty secretions from the right eye, indicating possible infection.   Prescribed eye drops for right eye every 6 hours (4 times daily) for 1 week.   If left eye becomes affected, drops can be used in both eyes.   Instructed the patient to wash  pillowcase to prevent spread of eye infection.    CERUMEN IMPACTION:   Noted asymmetric ear clogging affecting hearing.   Referred the patient to ENT for ear unclogging.    DEHYDRATION AND GAIT ABNORMALITIES:   Measured low blood pressure likely due to dehydration, as patient is not eating or drinking well.   This is contributing to slower, unsteady gait.   Advised to increase fluid intake through water, ginger ale, Gatorade, and protein drinks to improve hydration status and potentially address gait issues.    FAMILY HISTORY:   Documented that the patient's daughter has stage 5 kidney disease and is on dialysis.    FOLLOW-UP:   Recommend follow up if condition worsens.               This note was generated with the assistance of ambient listening technology. Verbal consent was obtained by the patient and accompanying visitor(s) for the recording of patient appointment to facilitate this note. I attest to having reviewed and edited the generated note for accuracy, though some syntax or spelling errors may persist. Please contact the author of this note for any clarification.

## 2025-07-14 ENCOUNTER — OFFICE VISIT (OUTPATIENT)
Dept: OTOLARYNGOLOGY | Facility: CLINIC | Age: 85
End: 2025-07-14
Payer: MEDICARE

## 2025-07-14 VITALS — HEIGHT: 70 IN | BODY MASS INDEX: 22.82 KG/M2 | WEIGHT: 159.38 LBS

## 2025-07-14 DIAGNOSIS — H61.23 BILATERAL IMPACTED CERUMEN: ICD-10-CM

## 2025-07-14 PROCEDURE — 99999 PR PBB SHADOW E&M-EST. PATIENT-LVL III: CPT | Mod: PBBFAC,,, | Performed by: PHYSICIAN ASSISTANT

## 2025-07-14 PROCEDURE — 99499 UNLISTED E&M SERVICE: CPT | Mod: 25,S$GLB,, | Performed by: PHYSICIAN ASSISTANT

## 2025-07-14 NOTE — PROGRESS NOTES
"Subjective:   Patient ID: Pravin Ferrari is a 84 y.o. male.    Chief Complaint: Cerumen Impaction (Pt has come in for possible wax removal )    Patient is here to see me today for evaluation of a possible wax impaction in bilateral ears.  He has complaints of hearing loss in the affected ears, but denies pain or drainage.  This has been an issue in the past.  The patient has not been using any sort of ear drop to soften the wax.       Review of patient's allergies indicates:   Allergen Reactions    Lisinopril Swelling           Review of Systems   HENT:  Positive for hearing loss.          Objective:   Ht 5' 10" (1.778 m)   Wt 72.3 kg (159 lb 6.3 oz)   BMI 22.87 kg/m²     Physical Exam  HENT:      Right Ear: There is impacted cerumen.      Left Ear: There is impacted cerumen.          Procedure Note    CHIEF COMPLAINT:  Cerumen Impaction    Description:  The patient was seated in an exam chair.  An ear speculum was placed in the right EAC and was examined under the microscope.  Suction and/or loop curettes were used to remove a large cerumen impaction.  The tympanic membrane was visualized and was normal in appearance.  The procedure was repeated on the left side in a similar fashion.  The TM was intact and normal on this side as well.  The patient tolerated the procedure well.     Assessment:     1. Bilateral impacted cerumen        Plan:     Bilateral impacted cerumen  -     Ambulatory referral/consult to ENT       Cerumen impaction:  Removed today without difficulty.  I would recommend the use of a wax softening drop, either over the counter Debrox or mineral oil, on a weekly basis.  I also instructed the patient to avoid Qtips.     "

## 2025-07-17 DIAGNOSIS — I67.2 CEREBRAL ATHEROSCLEROSIS: ICD-10-CM

## 2025-07-17 DIAGNOSIS — Z86.73 HISTORY OF CVA (CEREBROVASCULAR ACCIDENT) WITHOUT RESIDUAL DEFICITS: ICD-10-CM

## 2025-07-17 DIAGNOSIS — I70.0 AORTIC ATHEROSCLEROSIS: ICD-10-CM

## 2025-07-18 RX ORDER — CLOPIDOGREL BISULFATE 75 MG/1
TABLET ORAL
Qty: 90 TABLET | Refills: 3 | Status: SHIPPED | OUTPATIENT
Start: 2025-07-18

## 2025-07-30 DIAGNOSIS — Z86.73 HISTORY OF CVA (CEREBROVASCULAR ACCIDENT) WITHOUT RESIDUAL DEFICITS: ICD-10-CM

## 2025-07-30 DIAGNOSIS — I70.0 AORTIC ATHEROSCLEROSIS: Chronic | ICD-10-CM

## 2025-07-30 DIAGNOSIS — I67.2 CEREBRAL ATHEROSCLEROSIS: Chronic | ICD-10-CM

## 2025-07-30 NOTE — TELEPHONE ENCOUNTER
No care due was identified.  [facility] Embedded Care Due Messages. Reference number: [id number].   7/30/2025 8:08:55 AM CDT

## 2025-07-31 RX ORDER — ATORVASTATIN CALCIUM 40 MG/1
40 TABLET, FILM COATED ORAL
Qty: 90 TABLET | Refills: 3 | Status: SHIPPED | OUTPATIENT
Start: 2025-07-31

## 2025-09-02 ENCOUNTER — OFFICE VISIT (OUTPATIENT)
Dept: INTERNAL MEDICINE | Facility: CLINIC | Age: 85
End: 2025-09-02
Payer: MEDICARE

## 2025-09-02 VITALS
BODY MASS INDEX: 23.8 KG/M2 | HEART RATE: 69 BPM | DIASTOLIC BLOOD PRESSURE: 80 MMHG | OXYGEN SATURATION: 95 % | SYSTOLIC BLOOD PRESSURE: 124 MMHG | WEIGHT: 166.25 LBS | HEIGHT: 70 IN

## 2025-09-02 DIAGNOSIS — I83.891 VARICOSE VEINS OF RIGHT LOWER EXTREMITY WITH OTHER COMPLICATIONS: ICD-10-CM

## 2025-09-02 DIAGNOSIS — I83.892 VARICOSE VEINS OF LEFT LOWER EXTREMITY WITH OTHER COMPLICATIONS: ICD-10-CM

## 2025-09-02 DIAGNOSIS — L81.9 DISCOLORATION OF SKIN OF FOOT: Primary | ICD-10-CM

## 2025-09-02 PROCEDURE — 99214 OFFICE O/P EST MOD 30 MIN: CPT | Mod: S$GLB,,, | Performed by: FAMILY MEDICINE

## 2025-09-02 PROCEDURE — 1160F RVW MEDS BY RX/DR IN RCRD: CPT | Mod: CPTII,S$GLB,, | Performed by: FAMILY MEDICINE

## 2025-09-02 PROCEDURE — 1159F MED LIST DOCD IN RCRD: CPT | Mod: CPTII,S$GLB,, | Performed by: FAMILY MEDICINE

## 2025-09-02 PROCEDURE — 1126F AMNT PAIN NOTED NONE PRSNT: CPT | Mod: CPTII,S$GLB,, | Performed by: FAMILY MEDICINE

## 2025-09-02 PROCEDURE — 3074F SYST BP LT 130 MM HG: CPT | Mod: CPTII,S$GLB,, | Performed by: FAMILY MEDICINE

## 2025-09-02 PROCEDURE — 1101F PT FALLS ASSESS-DOCD LE1/YR: CPT | Mod: CPTII,S$GLB,, | Performed by: FAMILY MEDICINE

## 2025-09-02 PROCEDURE — 3079F DIAST BP 80-89 MM HG: CPT | Mod: CPTII,S$GLB,, | Performed by: FAMILY MEDICINE

## 2025-09-02 PROCEDURE — 99999 PR PBB SHADOW E&M-EST. PATIENT-LVL III: CPT | Mod: PBBFAC,,, | Performed by: FAMILY MEDICINE

## 2025-09-02 PROCEDURE — 3288F FALL RISK ASSESSMENT DOCD: CPT | Mod: CPTII,S$GLB,, | Performed by: FAMILY MEDICINE

## 2025-09-04 ENCOUNTER — HOSPITAL ENCOUNTER (OUTPATIENT)
Dept: RADIOLOGY | Facility: HOSPITAL | Age: 85
Discharge: HOME | End: 2025-09-04
Attending: FAMILY MEDICINE
Payer: MEDICARE

## 2025-09-04 DIAGNOSIS — I83.892 VARICOSE VEINS OF LEFT LOWER EXTREMITY WITH OTHER COMPLICATIONS: ICD-10-CM

## 2025-09-04 DIAGNOSIS — L81.9 DISCOLORATION OF SKIN OF FOOT: ICD-10-CM

## 2025-09-04 DIAGNOSIS — I83.891 VARICOSE VEINS OF RIGHT LOWER EXTREMITY WITH OTHER COMPLICATIONS: ICD-10-CM

## 2025-09-04 PROCEDURE — 93970 EXTREMITY STUDY: CPT | Mod: TC
